# Patient Record
Sex: FEMALE | Race: BLACK OR AFRICAN AMERICAN | Employment: FULL TIME | URBAN - METROPOLITAN AREA
[De-identification: names, ages, dates, MRNs, and addresses within clinical notes are randomized per-mention and may not be internally consistent; named-entity substitution may affect disease eponyms.]

---

## 2017-04-03 ENCOUNTER — OFFICE VISIT (OUTPATIENT)
Dept: OBGYN CLINIC | Age: 37
End: 2017-04-03

## 2017-04-03 VITALS
BODY MASS INDEX: 27.42 KG/M2 | HEIGHT: 66 IN | WEIGHT: 170.6 LBS | HEART RATE: 72 BPM | DIASTOLIC BLOOD PRESSURE: 50 MMHG | RESPIRATION RATE: 18 BRPM | SYSTOLIC BLOOD PRESSURE: 112 MMHG

## 2017-04-03 DIAGNOSIS — Z01.419 WELL WOMAN EXAM WITH ROUTINE GYNECOLOGICAL EXAM: Primary | ICD-10-CM

## 2017-04-03 DIAGNOSIS — Z01.419 WELL WOMAN EXAM WITH ROUTINE GYNECOLOGICAL EXAM: ICD-10-CM

## 2017-04-03 DIAGNOSIS — Z11.51 SCREENING FOR HPV (HUMAN PAPILLOMAVIRUS): ICD-10-CM

## 2017-04-03 DIAGNOSIS — Z11.3 SCREENING FOR VENEREAL DISEASE: ICD-10-CM

## 2017-04-03 DIAGNOSIS — Z30.09 BIRTH CONTROL COUNSELING: ICD-10-CM

## 2017-04-03 LAB
HCG URINE, QL. (POC): NEGATIVE
VALID INTERNAL CONTROL?: YES

## 2017-04-03 RX ORDER — MEDROXYPROGESTERONE ACETATE 150 MG/ML
150 INJECTION, SUSPENSION INTRAMUSCULAR ONCE
Qty: 1 SYRINGE | Refills: 3
Start: 2017-04-03 | End: 2017-04-03

## 2017-04-03 NOTE — MR AVS SNAPSHOT
Visit Information Date & Time Provider Department Dept. Phone Encounter #  
 4/3/2017 11:00 AM Ariel Brown MD Disla Satish [phone] [id number] Follow-up Instructions Return in about 1 year (around 4/3/2018). Follow-up and Disposition History Upcoming Health Maintenance Date Due INFLUENZA AGE 9 TO ADULT 8/1/2016 PAP AKA CERVICAL CYTOLOGY 4/9/2017 Allergies as of 4/3/2017  Review Complete On: 4/3/2017 By: Ariel Brown MD  
 No Known Allergies Current Immunizations  Never Reviewed No immunizations on file. Not reviewed this visit You Were Diagnosed With   
  
 Codes Comments Well woman exam with routine gynecological exam    -  Primary ICD-10-CM: L48.434 ICD-9-CM: V72.31 Birth control counseling     ICD-10-CM: Z30.09 
ICD-9-CM: V25.09 Screening for venereal disease     ICD-10-CM: Z11.3 ICD-9-CM: V74.5 Screening for HPV (human papillomavirus)     ICD-10-CM: Z11.51 
ICD-9-CM: V73.81 Well woman exam with routine gynecological exam     ICD-10-CM: R67.883 ICD-9-CM: V72.31 [V72.31] Vitals BP Pulse Resp Height(growth percentile) Weight(growth percentile) LMP  
 112/50 (BP 1 Location: Left arm, BP Patient Position: Sitting) 72 18 5' 6\" (1.676 m) 170 lb 9.6 oz (77.4 kg) 03/24/2017 (Exact Date) BMI OB Status Smoking Status 27.54 kg/m2 Having regular periods Never Smoker BMI and BSA Data Body Mass Index Body Surface Area  
 27.54 kg/m 2 1.9 m 2 Preferred Pharmacy Pharmacy Name Phone [facility], [address] [place] [address] Your Updated Medication List  
  
   
This list is accurate as of: 4/3/17  4:04 PM.  Always use your most recent med list.  
  
  
  
  
 medroxyPROGESTERone 150 mg/mL Syrg Commonly known as:  DEPO-PROVERA  
1 mL by IntraMUSCular route once for 1 dose. We Performed the Following AMB POC URINE PREGNANCY TEST, VISUAL COLOR COMPARISON [70510 CPT(R)] HCV AB W/REFLEX VERIFICATION [[id number] Custom] HEP B SURFACE AG [id number] CPT(R)] HERPES SIMPLEX VIRUS TYPES 1/2 SPECIFIC AB, IGG [[id number] Custom] HIV 1/2 AG/AB, 4TH GENERATION,W RFLX CONFIRM [[id number] Custom] PAP IG, CT-NG, HPV 16&18,45([id number], [id number]) [[id number] Custom] RPR [26260 CPT(R)] Follow-up Instructions Return in about 1 year (around 4/3/2018). Patient Instructions Well Visit, Ages 25 to 48: Care Instructions Your Care Instructions Physical exams can help you stay healthy. Your doctor has checked your overall health and may have suggested ways to take good care of yourself. He or she also may have recommended tests. At home, you can help prevent illness with healthy eating, regular exercise, and other steps. Follow-up care is a key part of your treatment and safety. Be sure to make and go to all appointments, and call your doctor if you are having problems. It's also a good idea to know your test results and keep a list of the medicines you take. How can you care for yourself at home? · Reach and stay at a healthy weight. This will lower your risk for many problems, such as obesity, diabetes, heart disease, and high blood pressure. · Get at least 30 minutes of physical activity on most days of the week. Walking is a good choice. You also may want to do other activities, such as running, swimming, cycling, or playing tennis or team sports. Discuss any changes in your exercise program with your doctor. · Do not smoke or allow others to smoke around you. If you need help quitting, talk to your doctor about stop-smoking programs and medicines. These can increase your chances of quitting for good. · Talk to your doctor about whether you have any risk factors for sexually transmitted infections (STIs).  Having one sex partner (who does not have STIs and does not have sex with anyone else) is a good way to avoid these infections. · Use birth control if you do not want to have children at this time. Talk with your doctor about the choices available and what might be best for you. · Protect your skin from too much sun. When you're outdoors from 10 a.m. to 4 p.m., stay in the shade or cover up with clothing and a hat with a wide brim. Wear sunglasses that block UV rays. Even when it's cloudy, put broad-spectrum sunscreen (SPF 30 or higher) on any exposed skin. · See a dentist one or two times a year for checkups and to have your teeth cleaned. · Wear a seat belt in the car. · Drink alcohol in moderation, if at all. That means no more than 2 drinks a day for men and 1 drink a day for women. Follow your doctor's advice about when to have certain tests. These tests can spot problems early. For everyone · Cholesterol. Have the fat (cholesterol) in your blood tested after age 21. Your doctor will tell you how often to have this done based on your age, family history, or other things that can increase your risk for heart disease. · Blood pressure. Have your blood pressure checked during a routine doctor visit. Your doctor will tell you how often to check your blood pressure based on your age, your blood pressure results, and other factors. · Vision. Talk with your doctor about how often to have a glaucoma test. 
· Diabetes. Ask your doctor whether you should have tests for diabetes. · Colon cancer. Have a test for colon cancer at age 48. You may have one of several tests. If you are younger than 48, you may need a test earlier if you have any risk factors. Risk factors include whether you already had a precancerous polyp removed from your colon or whether your parent, brother, sister, or child has had colon cancer. For women · Breast exam and mammogram. Talk to your doctor about when you should have a clinical breast exam and a mammogram. Medical experts differ on whether and how often women under 50 should have these tests. Your doctor can help you decide what is right for you. · Pap test and pelvic exam. Begin Pap tests at age 24. A Pap test is the best way to find cervical cancer. The test often is part of a pelvic exam. Ask how often to have this test. 
· Tests for sexually transmitted infections (STIs). Ask whether you should have tests for STIs. You may be at risk if you have sex with more than one person, especially if your partners do not wear condoms. For men · Tests for sexually transmitted infections (STIs). Ask whether you should have tests for STIs. You may be at risk if you have sex with more than one person, especially if you do not wear a condom. · Testicular cancer exam. Ask your doctor whether you should check your testicles regularly. · Prostate exam. Talk to your doctor about whether you should have a blood test (called a PSA test) for prostate cancer. Experts differ on whether and when men should have this test. Some experts suggest it if you are older than 39 and are -American or have a father or brother who got prostate cancer when he was younger than 72. When should you call for help? Watch closely for changes in your health, and be sure to contact your doctor if you have any problems or symptoms that concern you. Where can you learn more? Go to http://link-seema.info/. Enter P072 in the search box to learn more about \"Well Visit, Ages 25 to 48: Care Instructions. \" Current as of: July 19, 2016 Content Version: 11.2 © 0321-9059 [employer]wise, [employer]orporated. Care instructions adapted under license by [employer] (which disclaims liability or warranty for this information).  If you have questions about a medical condition or this instruction, always ask your healthcare professional. [employer] disclaims any warranty or liability for your use of this information. Introducing [facility]! Alfonso Keith introduces [employer] patient portal. Now you can access parts of your medical record, email your doctor's office, and request medication refills online. 1. In your internet browser, go to [email or web address] 2. Click on the First Time User? Click Here link in the Sign In box. You will see the New Member Sign Up page. 3. Enter your [employer] Access Code exactly as it appears below. You will not need to use this code after youve completed the sign-up process. If you do not sign up before the expiration date, you must request a new code. · [employer] Access Code: [email or web address] Expires: 7/2/2017 11:27 AM 
 
4. Enter the last four digits of your Social Security Number (xxxx) and Date of Birth (mm/dd/yyyy) as indicated and click Submit. You will be taken to the next sign-up page. 5. Create a [employer] Online ID. This will be your [employer] login ID and cannot be changed, so think of one that is secure and easy to remember. 6. Create a [employer] password. You can change your password at any time. 7. Enter your Password Reset Question and Answer. This can be used at a later time if you forget your password. 8. Enter your e-mail address. You will receive e-mail notification when new information is available in [address] 9. Click Sign Up. You can now view and download portions of your medical record. 10. Click the Download Summary menu link to download a portable copy of your medical information. If you have questions, please visit the Frequently Asked Questions section of the [employer] website. Remember, [employer] Online is NOT to be used for urgent needs. For medical emergencies, dial 911. Now available from your iPhone and Android! Please provide this summary of care documentation to your next provider. Your primary care clinician is listed as NONE. If you have any questions after today's visit, please call [phone].

## 2017-04-03 NOTE — PATIENT INSTRUCTIONS

## 2017-04-03 NOTE — PROGRESS NOTES
Annual exam ages 21-44    [address] [facility] Alla is a ,  39 y.o. female [address] Patient's last menstrual period was 2017 (exact date). .    She presents for her annual checkup. She is having no significant problems at this time, however would like to start Depo Provera injections. With regard to the Gardasil vaccine, she is older than the FDA approved age to receive it. Menstrual status:    Her periods are moderate in flow. She is using three to five pads or tampons per day, usually regular and last 26-30 days. She denies dysmenorrhea. She reports no premenstrual symptoms. Contraception:    The current method of family planning is none. She wants to start Depo Provera. She wants STD testing. Sexual history:     She  reports that she currently engages in sexual activity and has had male partners. She reports using the following method of birth control/protection: None. .    Medical conditions:    Since her last annual GYN exam about two years ago, she has not the following changes in her health history: She has ongoing problems with anemia and will start IV iron soon. Pap and Mammogram History:    Her most recent Pap smear was normal, obtained 2 year(s) ago. The patient has never had a mammogram.    Breast Cancer History/Substance Abuse: negative    Past Medical History:   Diagnosis Date    Anemia     Hepatitis C     treated    Hx of abnormal Pap smear     Ill-defined condition     anemia hx, has received several blood transfusion, last blood transfusion May 5 2014    Intestinal perforation [facility])      Past Surgical History:   Procedure Laterality Date    HX GASTRIC BYPASS  2005    HX GI  2009    perforated intestines    HX GYN       x 4       Current Outpatient Prescriptions   Medication Sig Dispense Refill    oxycodone-acetaminophen (PERCOCET) 5-325 mg per tablet Take 1-2 tablets by mouth every four (4) hours as needed for Pain.  16 tablet 0 Allergies: Review of patient's allergies indicates no known allergies. Tobacco History:  reports that she has never smoked. She does not have any smokeless tobacco history on file. Alcohol Abuse:  reports that she does not drink alcohol. Drug Abuse:  reports that she does not use illicit drugs.     Family Medical/Cancer History:   Family History   Problem Relation Age of Onset    Diabetes Maternal Grandmother         Review of Systems - History obtained from the patient  Constitutional: negative for weight loss, fever, night sweats  HEENT: negative for hearing loss, earache, congestion, snoring, sorethroat  CV: negative for chest pain, palpitations, edema  Resp: negative for cough, shortness of breath, wheezing  GI: negative for change in bowel habits, abdominal pain, black or bloody stools  : negative for frequency, dysuria, hematuria, vaginal discharge  MSK: negative for back pain, joint pain, muscle pain  Breast: negative for breast lumps, nipple discharge, galactorrhea  Skin :negative for itching, rash, hives  Neuro: negative for dizziness, headache, confusion, weakness  Psych: negative for anxiety, depression, change in mood  Heme/lymph: negative for bleeding, bruising, pallor    Physical Exam    Visit Vitals    /50 (BP 1 Location: Left arm, BP Patient Position: Sitting)    Pulse 72    Resp 18    Ht 5' 6\" (1.676 m)    Wt 170 lb 9.6 oz (77.4 kg)    LMP 03/24/2017 (Exact Date)    BMI 27.54 kg/m2       Constitutional  · Appearance: well-nourished, well developed, alert, in no acute distress    HENT  · Head and Face: appears normal    Neck  · Inspection/Palpation: normal appearance, no masses or tenderness  · Lymph Nodes: no lymphadenopathy present  · Thyroid: gland size normal, nontender, no nodules or masses present on palpation    Chest  · Respiratory Effort: breathing unlabored  · Auscultation    Cardiovascular  · Heart:  · Auscultation:     Breasts  · Inspection of Breasts: breasts symmetrical, no skin changes, no discharge present, nipple appearance normal, no skin retraction present  · Palpation of Breasts and Axillae: no masses present on palpation, no breast tenderness  · Axillary Lymph Nodes: no lymphadenopathy present    Gastrointestinal  · Abdominal Examination: abdomen non-tender to palpation, normal bowel sounds, no masses present  · Liver and spleen: no hepatomegaly present, spleen not palpable  · Hernias: no hernias identified    Genitourinary  · External Genitalia: normal appearance for age, no discharge present, no tenderness present, no inflammatory lesions present, no masses present, no atrophy present  · Vagina: normal vaginal vault without central or paravaginal defects, no discharge present, no inflammatory lesions present, no masses present  · Bladder: non-tender to palpation  · Urethra: appears normal  · Cervix: normal   · Uterus: normal size, shape and consistency  · Adnexa: no adnexal tenderness present, no adnexal masses present  · Perineum: perineum within normal limits, no evidence of trauma, no rashes or skin lesions present  · Anus: anus within normal limits, no hemorrhoids present  · Inguinal Lymph Nodes: no lymphadenopathy present    Skin  · General Inspection: no rash, no lesions identified    Neurologic/Psychiatric  · Mental Status:  · Orientation: grossly oriented to person, place and time  · Mood and Affect: mood normal, affect appropriate    . Assessment:  Routine gynecologic examination  Her current medical status is satisfactory with no evidence of significant gynecologic issues. Plan:  Counseled re: diet, exercise, healthy lifestyle  Return for yearly wellness visits  She will have STD testing. She will start Depo Provera with her next menses.

## 2017-04-04 ENCOUNTER — TELEPHONE (OUTPATIENT)
Dept: OBGYN CLINIC | Age: 37
End: 2017-04-04

## 2017-04-04 LAB
COMMENT, 144067: NORMAL
HBV SURFACE AG SERPL QL IA: NEGATIVE
HCV AB S/CO SERPL IA: <0.1 S/CO RATIO (ref 0–0.9)
HIV 1+2 AB+HIV1 P24 AG SERPL QL IA: NON REACTIVE
HSV1 IGG SER IA-ACNC: <0.91 INDEX (ref 0–0.9)
HSV2 IGG SER IA-ACNC: 22.5 INDEX (ref 0–0.9)
RPR SER QL: NON REACTIVE

## 2017-04-04 NOTE — TELEPHONE ENCOUNTER
Blood work is normal except for evidence of past infection with herpes type 2. Her pap and GC/chlamydia won't be back till next week.

## 2017-04-06 LAB
C TRACH RRNA CVX QL NAA+PROBE: NEGATIVE
CYTOLOGIST CVX/VAG CYTO: ABNORMAL
CYTOLOGY CVX/VAG DOC THIN PREP: ABNORMAL
CYTOLOGY HISTORY:: ABNORMAL
DX ICD CODE: ABNORMAL
DX ICD CODE: ABNORMAL
HPV I/H RISK 1 DNA CVX QL PROBE+SIG AMP: POSITIVE
Lab: ABNORMAL
N GONORRHOEA RRNA CVX QL NAA+PROBE: NEGATIVE
OTHER STN SPEC: ABNORMAL
PATH REPORT.FINAL DX SPEC: ABNORMAL
PATHOLOGIST CVX/VAG CYTO: ABNORMAL
STAT OF ADQ CVX/VAG CYTO-IMP: ABNORMAL

## 2017-04-19 ENCOUNTER — OP HISTORICAL/CONVERTED ENCOUNTER (OUTPATIENT)
Dept: OTHER | Age: 37
End: 2017-04-19

## 2017-04-26 ENCOUNTER — ED HISTORICAL/CONVERTED ENCOUNTER (OUTPATIENT)
Dept: OTHER | Age: 37
End: 2017-04-26

## 2017-04-27 ENCOUNTER — ED HISTORICAL/CONVERTED ENCOUNTER (OUTPATIENT)
Dept: OTHER | Age: 37
End: 2017-04-27

## 2017-12-12 ENCOUNTER — TELEPHONE (OUTPATIENT)
Dept: OBGYN CLINIC | Age: 37
End: 2017-12-12

## 2017-12-12 RX ORDER — ACYCLOVIR 400 MG/1
400 TABLET ORAL 3 TIMES DAILY
Qty: 15 TAB | Refills: 0 | Status: SHIPPED | OUTPATIENT
Start: 2017-12-12 | End: 2017-12-17

## 2017-12-12 NOTE — TELEPHONE ENCOUNTER
Patient aware of MD recommendations and aware that rx was sent. 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 Patient advised that it is crucial that she follow up on her abnormal pap smear and patient reports that she will call back tomorrow to schedule the colpo procedure once she has her schedule.

## 2017-12-12 NOTE — TELEPHONE ENCOUNTER
LMTCB 12:00 pm- need to advise regarding the Acyclovir rx and abnormal pap smear- ASCUS- needs to set up for Colpo

## 2017-12-12 NOTE — TELEPHONE ENCOUNTER
She can have Acyclovir 400 mg TID for 5 days. Script sent. Please advise her that her Pap in April was abnormal and we were unable to contact her after multiple attempts. We need current contact information and she needs to make an appt for a colposcopy to rule out cervical cancer.

## 2017-12-12 NOTE — TELEPHONE ENCOUNTER
Patient is having what she thinks is an active  outbreak of genital herpes lesions and is requesting rx of Acyclovir to be sent to [facility] [place]. Tested positive for HSV 2 on 4/3/17. Please advise.

## 2018-02-21 ENCOUNTER — TELEPHONE (OUTPATIENT)
Dept: OBGYN CLINIC | Age: 38
End: 2018-02-21

## 2018-02-21 RX ORDER — ACYCLOVIR 400 MG/1
TABLET ORAL
Qty: 15 TAB | Refills: 0 | Status: SHIPPED | OUTPATIENT
Start: 2018-02-21 | End: 2018-04-19 | Stop reason: SDUPTHER

## 2018-02-21 RX ORDER — ACYCLOVIR 50 MG/G
OINTMENT TOPICAL
Qty: 2 G | Refills: 0 | Status: SHIPPED | OUTPATIENT
Start: 2018-02-21 | End: 2021-10-21

## 2018-02-21 NOTE — TELEPHONE ENCOUNTER
Call received at 9:18am      40year old patient last seen in the office on 4/3/17. Patient calling to ask for a refill for Valtrex pills and cream for a herpes outbreak, bump in vaginal area that she noted yesterday.       Prescription last filled on 12/12/17      Please advise

## 2018-02-21 NOTE — TELEPHONE ENCOUNTER
Patient advised of MD recommendations and verbalized understanding. Patient placed on the schedule for 2/1/18 at 10:100am for colposcopy. Prescriptions sent as per MD order to patient preferred pharmacy. Patient verbalized understanding.

## 2018-02-21 NOTE — TELEPHONE ENCOUNTER
She can ONLY have a refill if she makes an appt for a colposcopy. If she does not keep the appt for the colposcopy then I cannot give her any more refills after this.

## 2018-03-01 ENCOUNTER — OFFICE VISIT (OUTPATIENT)
Dept: OBGYN CLINIC | Age: 38
End: 2018-03-01

## 2018-03-01 VITALS — HEIGHT: 66 IN | RESPIRATION RATE: 19 BRPM

## 2018-03-01 DIAGNOSIS — R87.610 ASCUS WITH POSITIVE HIGH RISK HPV CERVICAL: Primary | ICD-10-CM

## 2018-03-01 DIAGNOSIS — R87.810 ASCUS WITH POSITIVE HIGH RISK HPV CERVICAL: Primary | ICD-10-CM

## 2018-03-01 DIAGNOSIS — Z32.02 NEGATIVE PREGNANCY TEST: ICD-10-CM

## 2018-03-01 LAB
HCG URINE, QL. (POC): NEGATIVE
VALID INTERNAL CONTROL?: YES

## 2018-03-01 RX ORDER — NORGESTIMATE AND ETHINYL ESTRADIOL 0.25-0.035
1 KIT ORAL DAILY
Qty: 1 DOSE PACK | Refills: 3 | Status: SHIPPED | OUTPATIENT
Start: 2018-03-01 | End: 2021-10-21

## 2018-03-01 NOTE — PROGRESS NOTES
[facility]  OFFICE PROCEDURE PROGRESS NOTE        Chart reviewed for the following:   [place] MCFARLANE, have reviewed the History, Physical and updated the Allergic reactions for [address] performed immediately prior to start of procedure:   [place] MCFARLANE, have performed the following reviews on Yovany prior to the start of the procedure:            * Patient was identified by name and date of birth   * Agreement on procedure being performed was verified  * Risks and Benefits explained to the patient  * Consent was signed and verified     Time: 10:50am     Date of procedure: 3/1/2018    Procedure performed by: Chris Watson MD    Provider assisted by: Penelope Betancourt MA    Patient assisted by: self    How tolerated by patient: tolerated the procedure well with no complications    Post Procedural Pain Scale: 0 - No Hurt    Comments: none    Procedure Note: Colposcopy    Yovany is a ,  40 y.o. female [address] whose Patient's last menstrual period was 2018 (exact date). was on 2018. She presents for colposcopy for evaluation of a cervical abnormality with a pap smear abnormality consisting of ASCUS and HPV. After being presented with the risks, benefits and alternatives has she signed a consent for the procedure. She states that she understands the need for the procedure and has no further questions. She was informed that she may experience discomfort. Procedure:  She was positioned in the dorsal lithotomy position and a speculum was inserted into the vagina. Dilute acetic acid was applied to the cervix. The colposcope was used to visualize the cervix. Procedure: The transformation zone was completely visualized. Findings: This colposcopy was satisfactory. Biopsies were taken from the cervix, placed in formalin, and sent to pathology. See accompanying diagram for biopsy sites. Monsels was applied to the cervix. Endocervical currettage: An endocervical curettage was performed. Findings:The procedure was notable for white epithelium on the cervix. Post Procedure Status: The patient tolerated the procedure well with minimal discomfort. She wants to start [facility] and requests OCPs.

## 2018-03-01 NOTE — LETTER
3/16/2018 2:00 PM 
 
 
 
 
 
 
[facility] [place] [address] Dear Jerome Maharaj We have been unable to reach you by telephone regarding your results. Please call our office at [phone] at your earliest convenience. Respectfully, Esteban Herbert Dr [facility] [place] Nurse

## 2018-03-06 ENCOUNTER — ED HISTORICAL/CONVERTED ENCOUNTER (OUTPATIENT)
Dept: OTHER | Age: 38
End: 2018-03-06

## 2018-03-07 LAB
DX ICD CODE: NORMAL
DX ICD CODE: NORMAL
PATH REPORT.FINAL DX SPEC: NORMAL
PATH REPORT.GROSS SPEC: NORMAL
PATH REPORT.SITE OF ORIGIN SPEC: NORMAL
PATHOLOGIST NAME: NORMAL
PAYMENT PROCEDURE: NORMAL

## 2018-04-19 ENCOUNTER — TELEPHONE (OUTPATIENT)
Dept: OBGYN CLINIC | Age: 38
End: 2018-04-19

## 2018-04-19 RX ORDER — ACYCLOVIR 400 MG/1
TABLET ORAL
Qty: 15 TAB | Refills: 0 | Status: SHIPPED | OUTPATIENT
Start: 2018-04-19 | End: 2018-10-08 | Stop reason: SDUPTHER

## 2018-04-19 NOTE — TELEPHONE ENCOUNTER
Refill on prescription sent for Zovirax as per MD order to patient preferred pharmacy. This nurse left a detailed message as requested by the patient that prescription has been sent.

## 2018-04-19 NOTE — TELEPHONE ENCOUNTER
Call received at 10:45am      40year old patient last seen in the office on 4/3/18 for AE and 3/1/18 for colposcopy. Patient calling to say that starting a few days ago she is feeling itching, burning and tingling in her vaginal area and feels an HSV out break coming on .     Patient would like a refill on her medication      Please advise

## 2018-06-26 ENCOUNTER — TELEPHONE (OUTPATIENT)
Dept: OBGYN CLINIC | Age: 38
End: 2018-06-26

## 2018-06-26 NOTE — TELEPHONE ENCOUNTER
If she has stopped the pill then do a pregnancy test and if its negative then bleeding should stop on its own this week. If bleeding continues or she is worried then come in for exam and to check her hemoglobin.

## 2018-06-26 NOTE — TELEPHONE ENCOUNTER
Call received at 509am    45year old patient last seen in the office on 3/1/18 for problem visit. Patient calling to say that she has been bleeding since starting the [address] [place] [address] on 6/3/18. Patient reports the flow is heavy changing her pad every  3 hours. Patient denies cramping. Patient reports she is feeling tired, exhausted and has a headache. Patient report history of anemia. Patient reported that she had been taking the birth control every day at the same time until last Thursday when she stopped taking the pill. The patient wants to have the bleeding stop and does not want to take Sprintec any more. Patient is due for AE and states she will schedule when the bleeding has stopped.       Please advise

## 2018-10-08 ENCOUNTER — TELEPHONE (OUTPATIENT)
Dept: OBGYN CLINIC | Age: 38
End: 2018-10-08

## 2018-10-08 RX ORDER — ACYCLOVIR 400 MG/1
TABLET ORAL
Qty: 15 TAB | Refills: 3 | Status: SHIPPED | OUTPATIENT
Start: 2018-10-08 | End: 2022-01-03 | Stop reason: ALTCHOICE

## 2018-10-08 NOTE — TELEPHONE ENCOUNTER
Patient is calling to see if she can have Acyclovir 400 mg she takes with outbreaks. She is currently having a vaginal outbreak. Acyclovir 400 mg- takes one tablet by mouth TID day x 5 days        [facility]. Patient thinks she may be pregnant. She admits that she needs to take a pregnancy test but she is drinking. 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

## 2018-11-20 ENCOUNTER — ED HISTORICAL/CONVERTED ENCOUNTER (OUTPATIENT)
Dept: OTHER | Age: 38
End: 2018-11-20

## 2018-12-16 ENCOUNTER — ED HISTORICAL/CONVERTED ENCOUNTER (OUTPATIENT)
Dept: OTHER | Age: 38
End: 2018-12-16

## 2019-04-08 RX ORDER — ETONOGESTREL AND ETHINYL ESTRADIOL 11.7; 2.7 MG/1; MG/1
INSERT, EXTENDED RELEASE VAGINAL
Qty: 1 DEVICE | Refills: 0 | Status: SHIPPED | OUTPATIENT
Start: 2019-04-08 | End: 2021-10-21

## 2019-04-08 NOTE — TELEPHONE ENCOUNTER
Requesting refill of nuvaring, going out of town and will have AE when she comes back.  Last AE 03/2018

## 2019-05-04 ENCOUNTER — ED HISTORICAL/CONVERTED ENCOUNTER (OUTPATIENT)
Dept: OTHER | Age: 39
End: 2019-05-04

## 2019-05-05 ENCOUNTER — ED HISTORICAL/CONVERTED ENCOUNTER (OUTPATIENT)
Dept: OTHER | Age: 39
End: 2019-05-05

## 2019-05-08 ENCOUNTER — ED HISTORICAL/CONVERTED ENCOUNTER (OUTPATIENT)
Dept: OTHER | Age: 39
End: 2019-05-08

## 2019-05-10 ENCOUNTER — OP HISTORICAL/CONVERTED ENCOUNTER (OUTPATIENT)
Dept: OTHER | Age: 39
End: 2019-05-10

## 2019-05-17 ENCOUNTER — OP HISTORICAL/CONVERTED ENCOUNTER (OUTPATIENT)
Dept: OTHER | Age: 39
End: 2019-05-17

## 2019-07-18 ENCOUNTER — ED HISTORICAL/CONVERTED ENCOUNTER (OUTPATIENT)
Dept: OTHER | Age: 39
End: 2019-07-18

## 2021-04-07 ENCOUNTER — TELEPHONE (OUTPATIENT)
Dept: SURGERY | Age: 41
End: 2021-04-07

## 2021-04-07 ENCOUNTER — HOSPITAL ENCOUNTER (OUTPATIENT)
Age: 41
Setting detail: OBSERVATION
Discharge: HOME | End: 2021-04-09
Attending: SURGERY | Admitting: SURGERY
Payer: COMMERCIAL

## 2021-04-07 ENCOUNTER — OFFICE VISIT (OUTPATIENT)
Dept: SURGERY | Age: 41
End: 2021-04-07
Payer: COMMERCIAL

## 2021-04-07 VITALS
SYSTOLIC BLOOD PRESSURE: 107 MMHG | OXYGEN SATURATION: 98 % | HEART RATE: 85 BPM | BODY MASS INDEX: 26.2 KG/M2 | RESPIRATION RATE: 18 BRPM | DIASTOLIC BLOOD PRESSURE: 69 MMHG | HEIGHT: 66 IN | TEMPERATURE: 98.7 F | WEIGHT: 163 LBS

## 2021-04-07 DIAGNOSIS — K28.9 MARGINAL ULCER: Primary | ICD-10-CM

## 2021-04-07 DIAGNOSIS — R13.19 ESOPHAGEAL DYSPHAGIA: Primary | ICD-10-CM

## 2021-04-07 PROBLEM — E55.9 VITAMIN D DEFICIENCY: Status: ACTIVE | Noted: 2021-04-07

## 2021-04-07 PROBLEM — R13.10 DYSPHAGIA: Status: ACTIVE | Noted: 2021-04-07

## 2021-04-07 PROBLEM — D50.9 MICROCYTIC ANEMIA: Status: ACTIVE | Noted: 2021-04-07

## 2021-04-07 LAB
25(OH)D3 SERPL-MCNC: 16.2 NG/ML (ref 30–100)
ALBUMIN SERPL-MCNC: 3.4 G/DL (ref 3.5–5)
ALBUMIN/GLOB SERPL: 0.9 {RATIO} (ref 1.1–2.2)
ALP SERPL-CCNC: 42 U/L (ref 45–117)
ALT SERPL-CCNC: 32 U/L (ref 12–78)
ANION GAP SERPL CALC-SCNC: 5 MMOL/L (ref 5–15)
APPEARANCE UR: CLEAR
AST SERPL-CCNC: 21 U/L (ref 15–37)
BACTERIA URNS QL MICRO: NEGATIVE /HPF
BASOPHILS # BLD: 0 K/UL (ref 0–0.1)
BASOPHILS NFR BLD: 0 % (ref 0–1)
BILIRUB SERPL-MCNC: 0.4 MG/DL (ref 0.2–1)
BILIRUB UR QL: NEGATIVE
BUN SERPL-MCNC: 14 MG/DL (ref 6–20)
BUN/CREAT SERPL: 18 (ref 12–20)
CALCIUM SERPL-MCNC: 9.4 MG/DL (ref 8.5–10.1)
CHLORIDE SERPL-SCNC: 107 MMOL/L (ref 97–108)
CO2 SERPL-SCNC: 26 MMOL/L (ref 21–32)
COLOR UR: NORMAL
COMMENT, HOLDF: NORMAL
COVID-19 RAPID TEST, COVR: NOT DETECTED
CREAT SERPL-MCNC: 0.76 MG/DL (ref 0.55–1.02)
DIFFERENTIAL METHOD BLD: ABNORMAL
EOSINOPHIL # BLD: 0.1 K/UL (ref 0–0.4)
EOSINOPHIL NFR BLD: 2 % (ref 0–7)
EPITH CASTS URNS QL MICRO: NORMAL /LPF
ERYTHROCYTE [DISTWIDTH] IN BLOOD BY AUTOMATED COUNT: 24.3 % (ref 11.5–14.5)
FOLATE SERPL-MCNC: 8.4 NG/ML (ref 5–21)
GLOBULIN SER CALC-MCNC: 3.8 G/DL (ref 2–4)
GLUCOSE SERPL-MCNC: 83 MG/DL (ref 65–100)
GLUCOSE UR STRIP.AUTO-MCNC: NEGATIVE MG/DL
HCT VFR BLD AUTO: 32.5 % (ref 35–47)
HGB BLD-MCNC: 9.6 G/DL (ref 11.5–16)
HGB UR QL STRIP: NEGATIVE
HYALINE CASTS URNS QL MICRO: NORMAL /LPF (ref 0–5)
IMM GRANULOCYTES # BLD AUTO: 0 K/UL (ref 0–0.04)
IMM GRANULOCYTES NFR BLD AUTO: 0 % (ref 0–0.5)
IRON SATN MFR SERPL: 4 % (ref 20–50)
IRON SERPL-MCNC: 16 UG/DL (ref 35–150)
KETONES UR QL STRIP.AUTO: NEGATIVE MG/DL
LEUKOCYTE ESTERASE UR QL STRIP.AUTO: NEGATIVE
LYMPHOCYTES # BLD: 2.1 K/UL (ref 0.8–3.5)
LYMPHOCYTES NFR BLD: 32 % (ref 12–49)
MCH RBC QN AUTO: 21.6 PG (ref 26–34)
MCHC RBC AUTO-ENTMCNC: 29.5 G/DL (ref 30–36.5)
MCV RBC AUTO: 73.2 FL (ref 80–99)
MONOCYTES # BLD: 0.5 K/UL (ref 0–1)
MONOCYTES NFR BLD: 8 % (ref 5–13)
NEUTS SEG # BLD: 4 K/UL (ref 1.8–8)
NEUTS SEG NFR BLD: 58 % (ref 32–75)
NITRITE UR QL STRIP.AUTO: NEGATIVE
NRBC # BLD: 0 K/UL (ref 0–0.01)
NRBC BLD-RTO: 0 PER 100 WBC
PH UR STRIP: 6.5 [PH] (ref 5–8)
PLATELET # BLD AUTO: 183 K/UL (ref 150–400)
POTASSIUM SERPL-SCNC: 3.8 MMOL/L (ref 3.5–5.1)
PROT SERPL-MCNC: 7.2 G/DL (ref 6.4–8.2)
PROT UR STRIP-MCNC: NEGATIVE MG/DL
RBC # BLD AUTO: 4.44 M/UL (ref 3.8–5.2)
RBC #/AREA URNS HPF: NORMAL /HPF (ref 0–5)
RBC MORPH BLD: ABNORMAL
SAMPLES BEING HELD,HOLD: NORMAL
SODIUM SERPL-SCNC: 138 MMOL/L (ref 136–145)
SOURCE, COVRS: NORMAL
SP GR UR REFRACTOMETRY: 1.02 (ref 1–1.03)
TIBC SERPL-MCNC: 376 UG/DL (ref 250–450)
UR CULT HOLD, URHOLD: NORMAL
UROBILINOGEN UR QL STRIP.AUTO: 1 EU/DL (ref 0.2–1)
VIT B12 SERPL-MCNC: 468 PG/ML (ref 193–986)
WBC # BLD AUTO: 6.7 K/UL (ref 3.6–11)
WBC URNS QL MICRO: NORMAL /HPF (ref 0–4)

## 2021-04-07 PROCEDURE — 83550 IRON BINDING TEST: CPT

## 2021-04-07 PROCEDURE — 74011250637 HC RX REV CODE- 250/637: Performed by: SURGERY

## 2021-04-07 PROCEDURE — 74011250636 HC RX REV CODE- 250/636: Performed by: SURGERY

## 2021-04-07 PROCEDURE — 99218 HC RM OBSERVATION: CPT

## 2021-04-07 PROCEDURE — 36415 COLL VENOUS BLD VENIPUNCTURE: CPT

## 2021-04-07 PROCEDURE — 75810000275 HC EMERGENCY DEPT VISIT NO LEVEL OF CARE

## 2021-04-07 PROCEDURE — 96374 THER/PROPH/DIAG INJ IV PUSH: CPT

## 2021-04-07 PROCEDURE — 82746 ASSAY OF FOLIC ACID SERUM: CPT

## 2021-04-07 PROCEDURE — 82306 VITAMIN D 25 HYDROXY: CPT

## 2021-04-07 PROCEDURE — 80053 COMPREHEN METABOLIC PANEL: CPT

## 2021-04-07 PROCEDURE — 87635 SARS-COV-2 COVID-19 AMP PRB: CPT

## 2021-04-07 PROCEDURE — 84425 ASSAY OF VITAMIN B-1: CPT

## 2021-04-07 PROCEDURE — 99205 OFFICE O/P NEW HI 60 MIN: CPT | Performed by: SURGERY

## 2021-04-07 PROCEDURE — 82607 VITAMIN B-12: CPT

## 2021-04-07 PROCEDURE — 74011000258 HC RX REV CODE- 258: Performed by: SURGERY

## 2021-04-07 PROCEDURE — 81001 URINALYSIS AUTO W/SCOPE: CPT

## 2021-04-07 PROCEDURE — 85025 COMPLETE CBC W/AUTO DIFF WBC: CPT

## 2021-04-07 PROCEDURE — 99219 PR INITIAL OBSERVATION CARE/DAY 50 MINUTES: CPT | Performed by: SURGERY

## 2021-04-07 RX ORDER — SODIUM CHLORIDE 0.9 % (FLUSH) 0.9 %
5-40 SYRINGE (ML) INJECTION AS NEEDED
Status: DISCONTINUED | OUTPATIENT
Start: 2021-04-07 | End: 2021-04-09 | Stop reason: HOSPADM

## 2021-04-07 RX ORDER — DEXTROSE, SODIUM CHLORIDE, AND POTASSIUM CHLORIDE 5; .45; .15 G/100ML; G/100ML; G/100ML
25 INJECTION INTRAVENOUS CONTINUOUS
Status: DISCONTINUED | OUTPATIENT
Start: 2021-04-07 | End: 2021-04-09 | Stop reason: HOSPADM

## 2021-04-07 RX ORDER — SODIUM FERRIC GLUCONATE COMPLEX IN SUCROSE 12.5 MG/ML
125 INJECTION INTRAVENOUS DAILY
Status: DISCONTINUED | OUTPATIENT
Start: 2021-04-07 | End: 2021-04-07 | Stop reason: CLARIF

## 2021-04-07 RX ORDER — SODIUM CHLORIDE 0.9 % (FLUSH) 0.9 %
5-40 SYRINGE (ML) INJECTION EVERY 8 HOURS
Status: DISCONTINUED | OUTPATIENT
Start: 2021-04-07 | End: 2021-04-09 | Stop reason: HOSPADM

## 2021-04-07 RX ORDER — PROMETHAZINE HYDROCHLORIDE 25 MG/1
12.5 TABLET ORAL
Status: DISCONTINUED | OUTPATIENT
Start: 2021-04-07 | End: 2021-04-09 | Stop reason: HOSPADM

## 2021-04-07 RX ORDER — ONDANSETRON 2 MG/ML
4 INJECTION INTRAMUSCULAR; INTRAVENOUS
Status: DISCONTINUED | OUTPATIENT
Start: 2021-04-07 | End: 2021-04-09 | Stop reason: HOSPADM

## 2021-04-07 RX ORDER — ERGOCALCIFEROL 1.25 MG/1
50000 CAPSULE ORAL
Status: DISCONTINUED | OUTPATIENT
Start: 2021-04-07 | End: 2021-04-09 | Stop reason: HOSPADM

## 2021-04-07 RX ORDER — ZOLPIDEM TARTRATE 5 MG/1
5 TABLET ORAL
Status: DISCONTINUED | OUTPATIENT
Start: 2021-04-07 | End: 2021-04-09 | Stop reason: HOSPADM

## 2021-04-07 RX ADMIN — Medication 10 ML: at 23:24

## 2021-04-07 RX ADMIN — SODIUM CHLORIDE 125 MG: 9 INJECTION, SOLUTION INTRAVENOUS at 23:20

## 2021-04-07 RX ADMIN — ERGOCALCIFEROL 50000 UNITS: 1.25 CAPSULE ORAL at 23:20

## 2021-04-07 RX ADMIN — POTASSIUM CHLORIDE, DEXTROSE MONOHYDRATE AND SODIUM CHLORIDE 125 ML/HR: 150; 5; 450 INJECTION, SOLUTION INTRAVENOUS at 23:25

## 2021-04-07 RX ADMIN — ZOLPIDEM TARTRATE 5 MG: 5 TABLET ORAL at 23:51

## 2021-04-07 NOTE — H&P
Bariatric Surgery H&P / Consultation     CC: Dysphagia  Subjective:      The patient is I 38 y.o. female who underwent gastric bypass in  Dr. Darci Gonzalez subsequently underwent an emergency surgery for perforated intestine.  This was done at [facility]. Bakari Song is on able to tell me whether this was from a marginal ulcer perforation or small bowel perforation.  Around that time she was also taking NSAIDs.  She also reports an immediate within 1 month need for dilation after her bypass from stricture.  Patient has now moved to [place] and is here now because she wanted to continue care where she had the original surgery.  She had no bariatric follow-up to date.  Not smoking, no NSAIDs, no steroids.  She reports immediate emesis after most solid foods.  She is only able to tolerate ginger ale and some liquid diet.  She reports a 40 pound weight loss over the last year. Bakari Song reports epigastric pain that is 5 out of 10 and dull in the epigastrium.  No bilious emesis.  The pain does not radiate.  She started on a PPI last week which has provided some improvement she said but not much.  Avoiding solid foods helps her condition.  Patient is in town for a few days from [place] to have this addressed.  She reports minimal bowel function and dark urine.             Patient Active Problem List     Diagnosis Date Noted    Dysphagia 2021              Past Medical History:   Diagnosis Date    Anemia      GERD (gastroesophageal reflux disease)      Hepatitis C 2010     treated    Hx of abnormal Pap smear      Ill-defined condition       anemia hx, has received several blood transfusion, last blood transfusion May 5 2014    Intestinal perforation [facility])                  Past Surgical History:   Procedure Laterality Date    HX GASTRIC BYPASS       HX GI   2009     perforated intestines    HX GYN          x 4      Social History              Tobacco Use    Smoking status: Former Smoker    Smokeless tobacco: Never Used   Substance Use Topics    Alcohol use: No                Family History   Problem Relation Age of Onset    Diabetes Maternal Grandmother                      Prior to Admission medications    Medication Sig Start Date End Date Taking? Authorizing Provider   ethinyl estradiol-etonogestrel (NUVARING) 0.12-0.015 mg/24 hr vaginal ring Insert one ring per vagina, leave in place for 3 weeks and remove for 1 week 4/8/19     Carolyn Dill MD   acyclovir (ZOVIRAX) 400 mg tablet Take on tablet by mouth 3x day for 5 days 10/8/18     Carolyn Dill MD   norgestimate-ethinyl estradiol ([facility] [address],) 0.25-35 mg-mcg tab Take 1 Tab by mouth daily.  3/1/18     Carolyn Dill MD   acyclovir (ZOVIRAX) 5 % ointment Apply to affected area 5 times a day 2/21/18     Carolyn Dill MD      No Known Allergies      Review of Systems:    Constitutional: No fever or chills  Neurologic: No headache  Eyes: No scleral icterus or irritated eyes  Nose: No nasal pain or drainage  Mouth: No oral lesions or sore throat  Cardiac: No palpations or chest pain  Pulmonary: No cough or shortness or breath  Gastrointestinal: Oral intolerance  Genitourinary: No dysuria  Musculoskeletal: No muscle or joint tenderness  Skin: No rashes or lesions  Psychiatric: No anxiety or depressed mood        Objective:      Physical Exam:  Visit Vitals  /69   Pulse 85   Temp 98.7 °F (37.1 °C) (Oral)   Resp 18   Ht 5' 6\" (1.676 m)   Wt 163 lb (73.9 kg)   SpO2 98%   BMI 26.31 kg/m²      General: No acute distress, conversant  Eyes: PERRLA, no scleral icterus  HENT: Normocephalic without oral lesions  Neck: Trachea midline without LAD  Cardiac: Normal pulse rate and rhythm  Pulmonary: Symmetric chest rise with normal effort  GI: Soft, NT, ND, no hernia, no splenomegaly  Skin: Warm without rash  Extremities: No edema or joint stiffness  Psych: Appropriate mood and affect     Assessment:      Dysphagia after gastric bypass concerning for [address] [facility] stricture  Plan:   Patient here from out of town.  No follow-up from her bariatric nutritional point of view.  Significant weight loss.  Unable to tolerate solid p.o. Sylvester Mendiola for dehydration.  Patient needs to undergo urgent EGD with possible dilation.  Concern for GJ stricture and/or marginal ulcer.  Hopefully with dilation she can go back to tolerating p.o. and go back to [address] will be admitted to the hospital for urgent EGD given weight loss, dehydration, and dysphagia and need to check nutritional markers.  Patient understands these plans and elects to proceed.        Total time involved with this patient's care was: 65 minutes.  This involved reviewing patient record, talking with patient, and charting on patient.  Tensive time was spent coordinating care with consultants and ultimately admitting the patient.     Signed By: Roxanne Mcdonald MD  Bariatric and General Surgeon  [facility]     April 7, 2021

## 2021-04-07 NOTE — TELEPHONE ENCOUNTER
Dr. Campbell Revering with [facility] office called stating they don't accept patient's insurance, Randa Escalante.

## 2021-04-07 NOTE — ED TRIAGE NOTES
Triage Note: Patient is coming in with abdominal pain and not eating for about 1 year. Patient was told having surgery tomorrow by Bariatric surgeon. + nausea.

## 2021-04-07 NOTE — PROGRESS NOTES
Bariatric Surgery H&P / Consultation    CC: Dysphagia  Subjective: The patient is a 36 y.o. female who underwent gastric bypass in  Dr. Phan Arce. She subsequently underwent an emergency surgery for perforated intestine. This was done at [facility].  She is on able to tell me whether this was from a marginal ulcer perforation or small bowel perforation. Around that time she was also taking NSAIDs. She also reports an immediate within 1 month need for dilation after her bypass from stricture. Patient has now moved to [place] and is here now because she wanted to continue care where she had the original surgery. She had no bariatric follow-up to date. Not smoking, no NSAIDs, no steroids. She reports immediate emesis after most solid foods. She is only able to tolerate ginger ale and some liquid diet. She reports a 40 pound weight loss over the last year. She reports epigastric pain that is 5 out of 10 and dull in the epigastrium. No bilious emesis. The pain does not radiate. She started on a PPI last week which has provided some improvement she said but not much. Avoiding solid foods helps her condition. Patient is in town for a few days from [place] to have this addressed. She reports minimal bowel function and dark urine.     Patient Active Problem List    Diagnosis Date Noted    Dysphagia 2021      Past Medical History:   Diagnosis Date    Anemia     GERD (gastroesophageal reflux disease)     Hepatitis C 2010    treated    Hx of abnormal Pap smear     Ill-defined condition     anemia hx, has received several blood transfusion, last blood transfusion May 5 2014    Intestinal perforation [facility])      Past Surgical History:   Procedure Laterality Date    HX GASTRIC BYPASS      HX GI  2009    perforated intestines    HX GYN       x 4      Social History     Tobacco Use    Smoking status: Former Smoker    Smokeless tobacco: Never Used   Substance Use Topics    Alcohol use: No      Family History   Problem Relation Age of Onset    Diabetes Maternal Grandmother       Prior to Admission medications    Medication Sig Start Date End Date Taking? Authorizing Provider   ethinyl estradiol-etonogestrel (NUVARING) 0.12-0.015 mg/24 hr vaginal ring Insert one ring per vagina, leave in place for 3 weeks and remove for 1 week 4/8/19   Espinoza Pepe MD   acyclovir (ZOVIRAX) 400 mg tablet Take on tablet by mouth 3x day for 5 days 10/8/18   Espinoza Pepe MD   norgestimate-ethinyl estradiol ([facility] [address],) 0.25-35 mg-mcg tab Take 1 Tab by mouth daily. 3/1/18   Espinoza Pepe MD   acyclovir (ZOVIRAX) 5 % ointment Apply to affected area 5 times a day 2/21/18   Espinoza Pepe MD     No Known Allergies     Review of Systems:    Constitutional: No fever or chills  Neurologic: No headache  Eyes: No scleral icterus or irritated eyes  Nose: No nasal pain or drainage  Mouth: No oral lesions or sore throat  Cardiac: No palpations or chest pain  Pulmonary: No cough or shortness or breath  Gastrointestinal: Oral intolerance  Genitourinary: No dysuria  Musculoskeletal: No muscle or joint tenderness  Skin: No rashes or lesions  Psychiatric: No anxiety or depressed mood      Objective:     Physical Exam:  Visit Vitals  /69   Pulse 85   Temp 98.7 °F (37.1 °C) (Oral)   Resp 18   Ht 5' 6\" (1.676 m)   Wt 163 lb (73.9 kg)   SpO2 98%   BMI 26.31 kg/m²     General: No acute distress, conversant  Eyes: PERRLA, no scleral icterus  HENT: Normocephalic without oral lesions  Neck: Trachea midline without LAD  Cardiac: Normal pulse rate and rhythm  Pulmonary: Symmetric chest rise with normal effort  GI: Soft, NT, ND, no hernia, no splenomegaly  Skin: Warm without rash  Extremities: No edema or joint stiffness  Psych: Appropriate mood and affect    Assessment:     Dysphagia after gastric bypass concerning for GJ stricture  Plan:   Patient here from out of town.   No follow-up from her bariatric nutritional point of view. Significant weight loss. Unable to tolerate solid p.o. Concern for dehydration. Patient needs to undergo urgent EGD with possible dilation. Concern for GJ stricture and/or marginal ulcer. Hopefully with dilation she can go back to tolerating p.o. and go back to [place]. Patient will be admitted to the hospital for urgent EGD given weight loss, dehydration, and dysphagia and need to check nutritional markers. Patient understands these plans and elects to proceed. Total time involved with this patient's care was: 65 minutes. This involved reviewing patient record, talking with patient, and charting on patient. Tensive time was spent coordinating care with consultants and ultimately admitting the patient.     Signed By: Ema Tejeda MD  Bariatric and General Surgeon  [facility]    April 7, 2021

## 2021-04-08 ENCOUNTER — ANESTHESIA (OUTPATIENT)
Dept: ENDOSCOPY | Age: 41
End: 2021-04-08
Payer: COMMERCIAL

## 2021-04-08 ENCOUNTER — ANESTHESIA EVENT (OUTPATIENT)
Dept: ENDOSCOPY | Age: 41
End: 2021-04-08
Payer: COMMERCIAL

## 2021-04-08 PROBLEM — K28.9 MARGINAL ULCER: Status: ACTIVE | Noted: 2021-04-08

## 2021-04-08 PROCEDURE — 74011250637 HC RX REV CODE- 250/637: Performed by: SURGERY

## 2021-04-08 PROCEDURE — 74011250636 HC RX REV CODE- 250/636: Performed by: NURSE ANESTHETIST, CERTIFIED REGISTERED

## 2021-04-08 PROCEDURE — C9113 INJ PANTOPRAZOLE SODIUM, VIA: HCPCS | Performed by: PHYSICIAN ASSISTANT

## 2021-04-08 PROCEDURE — 74011000250 HC RX REV CODE- 250: Performed by: NURSE PRACTITIONER

## 2021-04-08 PROCEDURE — 74011250636 HC RX REV CODE- 250/636: Performed by: NURSE PRACTITIONER

## 2021-04-08 PROCEDURE — 74011000250 HC RX REV CODE- 250: Performed by: NURSE ANESTHETIST, CERTIFIED REGISTERED

## 2021-04-08 PROCEDURE — 99218 HC RM OBSERVATION: CPT

## 2021-04-08 PROCEDURE — 74011250637 HC RX REV CODE- 250/637: Performed by: INTERNAL MEDICINE

## 2021-04-08 PROCEDURE — 74011250636 HC RX REV CODE- 250/636: Performed by: PHYSICIAN ASSISTANT

## 2021-04-08 PROCEDURE — 2709999900 HC NON-CHARGEABLE SUPPLY: Performed by: INTERNAL MEDICINE

## 2021-04-08 PROCEDURE — 76060000031 HC ANESTHESIA FIRST 0.5 HR: Performed by: INTERNAL MEDICINE

## 2021-04-08 PROCEDURE — 77030021593 HC FCPS BIOP ENDOSC BSC -A: Performed by: INTERNAL MEDICINE

## 2021-04-08 PROCEDURE — 74011000258 HC RX REV CODE- 258: Performed by: SURGERY

## 2021-04-08 PROCEDURE — 99224 PR SBSQ OBSERVATION CARE/DAY 15 MINUTES: CPT | Performed by: SURGERY

## 2021-04-08 PROCEDURE — 76040000019: Performed by: INTERNAL MEDICINE

## 2021-04-08 PROCEDURE — 74011000250 HC RX REV CODE- 250: Performed by: PHYSICIAN ASSISTANT

## 2021-04-08 PROCEDURE — 88305 TISSUE EXAM BY PATHOLOGIST: CPT

## 2021-04-08 PROCEDURE — 74011250636 HC RX REV CODE- 250/636: Performed by: SURGERY

## 2021-04-08 RX ORDER — MIDAZOLAM HYDROCHLORIDE 1 MG/ML
.25-5 INJECTION, SOLUTION INTRAMUSCULAR; INTRAVENOUS
Status: DISCONTINUED | OUTPATIENT
Start: 2021-04-08 | End: 2021-04-08 | Stop reason: HOSPADM

## 2021-04-08 RX ORDER — SUCRALFATE 1 G/1
1 TABLET ORAL
Qty: 120 TAB | Refills: 3 | Status: SHIPPED | OUTPATIENT
Start: 2021-04-08 | End: 2021-05-08

## 2021-04-08 RX ORDER — SODIUM CHLORIDE 9 MG/ML
INJECTION, SOLUTION INTRAVENOUS
Status: DISCONTINUED | OUTPATIENT
Start: 2021-04-08 | End: 2021-04-08 | Stop reason: HOSPADM

## 2021-04-08 RX ORDER — HYDROMORPHONE HYDROCHLORIDE 1 MG/ML
1 INJECTION, SOLUTION INTRAMUSCULAR; INTRAVENOUS; SUBCUTANEOUS
Status: DISCONTINUED | OUTPATIENT
Start: 2021-04-08 | End: 2021-04-09 | Stop reason: HOSPADM

## 2021-04-08 RX ORDER — LIDOCAINE HYDROCHLORIDE 20 MG/ML
INJECTION, SOLUTION EPIDURAL; INFILTRATION; INTRACAUDAL; PERINEURAL AS NEEDED
Status: DISCONTINUED | OUTPATIENT
Start: 2021-04-08 | End: 2021-04-08 | Stop reason: HOSPADM

## 2021-04-08 RX ORDER — OXYCODONE AND ACETAMINOPHEN 5; 325 MG/1; MG/1
1 TABLET ORAL
Status: DISCONTINUED | OUTPATIENT
Start: 2021-04-08 | End: 2021-04-09 | Stop reason: HOSPADM

## 2021-04-08 RX ORDER — ATROPINE SULFATE 0.1 MG/ML
0.5 INJECTION INTRAVENOUS
Status: DISCONTINUED | OUTPATIENT
Start: 2021-04-08 | End: 2021-04-08 | Stop reason: HOSPADM

## 2021-04-08 RX ORDER — SODIUM CHLORIDE 0.9 % (FLUSH) 0.9 %
5-40 SYRINGE (ML) INJECTION EVERY 8 HOURS
Status: DISCONTINUED | OUTPATIENT
Start: 2021-04-08 | End: 2021-04-09 | Stop reason: HOSPADM

## 2021-04-08 RX ORDER — DEXTROMETHORPHAN/PSEUDOEPHED 2.5-7.5/.8
1.2 DROPS ORAL
Status: DISCONTINUED | OUTPATIENT
Start: 2021-04-08 | End: 2021-04-08 | Stop reason: HOSPADM

## 2021-04-08 RX ORDER — SODIUM CHLORIDE 9 MG/ML
150 INJECTION, SOLUTION INTRAVENOUS CONTINUOUS
Status: DISCONTINUED | OUTPATIENT
Start: 2021-04-08 | End: 2021-04-08

## 2021-04-08 RX ORDER — EPINEPHRINE 0.1 MG/ML
1 INJECTION INTRACARDIAC; INTRAVENOUS
Status: DISCONTINUED | OUTPATIENT
Start: 2021-04-08 | End: 2021-04-08 | Stop reason: HOSPADM

## 2021-04-08 RX ORDER — PROPOFOL 10 MG/ML
INJECTION, EMULSION INTRAVENOUS AS NEEDED
Status: DISCONTINUED | OUTPATIENT
Start: 2021-04-08 | End: 2021-04-08 | Stop reason: HOSPADM

## 2021-04-08 RX ORDER — SODIUM CHLORIDE 0.9 % (FLUSH) 0.9 %
5-40 SYRINGE (ML) INJECTION AS NEEDED
Status: DISCONTINUED | OUTPATIENT
Start: 2021-04-08 | End: 2021-04-09 | Stop reason: HOSPADM

## 2021-04-08 RX ORDER — SUCRALFATE 1 G/1
1 TABLET ORAL 4 TIMES DAILY
Status: DISCONTINUED | OUTPATIENT
Start: 2021-04-08 | End: 2021-04-09 | Stop reason: HOSPADM

## 2021-04-08 RX ORDER — ERGOCALCIFEROL 1.25 MG/1
50000 CAPSULE ORAL
Qty: 30 CAP | Refills: 5 | Status: SHIPPED | OUTPATIENT
Start: 2021-04-14 | End: 2021-10-21

## 2021-04-08 RX ORDER — PANTOPRAZOLE SODIUM 40 MG/1
40 TABLET, DELAYED RELEASE ORAL 2 TIMES DAILY
Qty: 60 TAB | Refills: 4 | Status: SHIPPED | OUTPATIENT
Start: 2021-04-08 | End: 2021-10-21

## 2021-04-08 RX ORDER — FENTANYL CITRATE 50 UG/ML
200 INJECTION, SOLUTION INTRAMUSCULAR; INTRAVENOUS
Status: DISCONTINUED | OUTPATIENT
Start: 2021-04-08 | End: 2021-04-08 | Stop reason: HOSPADM

## 2021-04-08 RX ORDER — SUCRALFATE 1 G/10ML
1 SUSPENSION ORAL 4 TIMES DAILY
Status: DISCONTINUED | OUTPATIENT
Start: 2021-04-08 | End: 2021-04-08 | Stop reason: CLARIF

## 2021-04-08 RX ADMIN — SODIUM CHLORIDE 40 MG: 9 INJECTION INTRAMUSCULAR; INTRAVENOUS; SUBCUTANEOUS at 21:31

## 2021-04-08 RX ADMIN — THIAMINE HYDROCHLORIDE: 100 INJECTION, SOLUTION INTRAMUSCULAR; INTRAVENOUS at 18:04

## 2021-04-08 RX ADMIN — Medication 10 ML: at 13:40

## 2021-04-08 RX ADMIN — PROPOFOL 50 MG: 10 INJECTION, EMULSION INTRAVENOUS at 14:27

## 2021-04-08 RX ADMIN — PROPOFOL 50 MG: 10 INJECTION, EMULSION INTRAVENOUS at 14:26

## 2021-04-08 RX ADMIN — LIDOCAINE HYDROCHLORIDE 60 MG: 20 INJECTION, SOLUTION EPIDURAL; INFILTRATION; INTRACAUDAL; PERINEURAL at 14:23

## 2021-04-08 RX ADMIN — Medication 10 ML: at 21:46

## 2021-04-08 RX ADMIN — PROPOFOL 50 MG: 10 INJECTION, EMULSION INTRAVENOUS at 14:28

## 2021-04-08 RX ADMIN — ZOLPIDEM TARTRATE 5 MG: 5 TABLET ORAL at 21:46

## 2021-04-08 RX ADMIN — POTASSIUM CHLORIDE, DEXTROSE MONOHYDRATE AND SODIUM CHLORIDE 125 ML/HR: 150; 5; 450 INJECTION, SOLUTION INTRAVENOUS at 07:10

## 2021-04-08 RX ADMIN — SODIUM CHLORIDE 40 MG: 9 INJECTION INTRAMUSCULAR; INTRAVENOUS; SUBCUTANEOUS at 10:50

## 2021-04-08 RX ADMIN — PROPOFOL 50 MG: 10 INJECTION, EMULSION INTRAVENOUS at 14:30

## 2021-04-08 RX ADMIN — PROPOFOL 100 MG: 10 INJECTION, EMULSION INTRAVENOUS at 14:24

## 2021-04-08 RX ADMIN — SODIUM CHLORIDE 125 MG: 9 INJECTION, SOLUTION INTRAVENOUS at 10:04

## 2021-04-08 RX ADMIN — PROPOFOL 100 MG: 10 INJECTION, EMULSION INTRAVENOUS at 14:23

## 2021-04-08 RX ADMIN — SODIUM CHLORIDE: 900 INJECTION, SOLUTION INTRAVENOUS at 14:01

## 2021-04-08 RX ADMIN — SUCRALFATE 1 G: 1 TABLET ORAL at 17:18

## 2021-04-08 RX ADMIN — SUCRALFATE 1 G: 1 TABLET ORAL at 21:31

## 2021-04-08 RX ADMIN — Medication 10 ML: at 21:32

## 2021-04-08 NOTE — ROUTINE PROCESS
TRANSFER - OUT REPORT:    Verbal report given to Richard Sheikh RN (name) on June Harness  being transferred to  [facility] room 604 (unit) for routine progression of care       Report consisted of patients Situation, Background, Assessment and   Recommendations(SBAR). Information from the following report(s) SBAR, ED Summary and MAR was reviewed with the receiving nurse. Lines:       Opportunity for questions and clarification was provided.       Patient transported with:   [employer]

## 2021-04-08 NOTE — PROCEDURES
[facility] [address] Esc[address] M.D.  Jing Augusto, [address] [facility]  [phone]               Esophagogastroduodenoscopy (EGD) Procedure Note    NAME: Hilaria Hall  :  1980  MRN:  [id number]    Indications:  history of gastric bypass, nausea/vomiting     : Ishaan Yun MD    Referring Provider:  None    Staff: Endoscopy Technician-1: Eyad Ledezma  Endoscopy RN-1: Aldair Zepeda RN    Prosthetic devices, grafts, tissues, transplant, or devices implanted: none    Medicine:  MAC anesthesia      Procedure Details:  After informed consent was obtained with all risks and benefits of the procedure explained and preprocedure exam completed, the patient was placed in the left lateral decubitus position. Universal protocol for patient identification was performed and documented in the nursing notes. Throughout the procedure, the patient's blood pressure was monitored at least every five minutes; pulse, and oxygen saturations were monitored continuously. All vital signs were documented in the nursing notes. The endoscope was inserted into the mouth and advanced under direct vision to proximal jejunum. A careful inspection was made as the gastroscope was withdrawn, including a retroflexed view of the proximal stomach; findings and interventions are described below.       Findings:   Esophagus:normal  Stomach: 12 cm pouch, ~ 12 mm anastomotic ulcer with mild stenosis (unable to dilate due to ulcer) s/p biopsies, rest of the pouch was normal s/p biopsies  Jejunum: normal    Interventions:    biopsy of stomach    Specimens:   ID Type Source Tests Collected by Time Destination   1 : Anastamotic ulcer bx Preservative Anastamosis   Jon Franklin MD 2021 1429 Pathology   2 : Gastric Bx Preservative Gastric  Jon Franklin MD 2021 1431 Pathology        EBL: None          Complications:     No immediate complications        Impression:  -See post-procedure diagnoses. Anastomotic ulcer. Recommendations:  -Await pathology  -PPI 40 mg BID  -Carafate  -Avoid NSAIDs  -Soft mechanical diet, advance as tolerated  -Will sign off. Please call with any questions. Signed by:  Damon Gardiner MD         4/8/2021 2:37 PM

## 2021-04-08 NOTE — CONSULTS
[address]   [address] [facility] [address]        GASTROENTEROLOGY CONSULTATION NOTE  Will Lizzeth Mojica  [phone] office  [phone] NP/PA in-hospital cell phone M-F until 4:30PM  After 5PM or on weekends, please call  for physician on call        NAME:  Hilaria Hall   :   1980   MRN:   [id number]       Referring Physician: Dr. Madeleine Bailey Date: 2021 9:34 AM    Chief Complaint: dysphagia     History of Present Illness:  Patient is a 36 y.o. who is seen in consultation at the request of Dr. Celine Joya for EGD for bariatric dysphagia after bypass. Patient has a past medical history significant for gastric bypass in  by Dr. Ovidio Bobo. History of subsequent emergent surgery for perforation (unclear from marginal ulcer perforation or small bowel perforation). History of EGD with dilation of anastomotic stricture by Dr. Cassius Bell in . Patient reports having similar symptoms at that time. Patient complains of dysphagia associated with pills and solids for the last approximately 2 years. She reports food sitting in the epigastrium with associated epigastric abdominal pain described as a burning sensation. There is associated nausea. She reports self-induced vomiting. No hematemesis. She reports abdominal bloating. Patient reports that she is only able to tolerate some of a liquid diet. History of constipation. Patient reports having a bowel movement every 2 weeks. No hematochezia or melena. No fevers. + Weight loss (40 lbs over the last year). Per report, she was started on a PPI last week with no significant improvement. No NSAID use. No anticoagulation. No alcohol or tobacco use. No history of colonoscopy. I have reviewed the emergency room note, hospital admission note, notes by all other clinicians who have seen the patient during this hospitalization to date.  I have reviewed the problem list and the reason for this hospitalization. I have reviewed the allergies and the medications the patient was taking at home prior to this hospitalization. PMH:  Past Medical History:   Diagnosis Date    Anemia     GERD (gastroesophageal reflux disease)     Hepatitis C 2010    treated    Hx of abnormal Pap smear     Ill-defined condition     anemia hx, has received several blood transfusion, last blood transfusion May 5 2014    Intestinal perforation ([facility] [address]        PSH:  Past Surgical History:   Procedure Laterality Date    HX GASTRIC BYPASS      HX GI  2009    perforated intestines    HX GYN       x 4       Allergies:  No Known Allergies    Home Medications:  Prior to Admission Medications   Prescriptions Last Dose Informant Patient Reported? Taking?   acyclovir (ZOVIRAX) 400 mg tablet Not Taking at Unknown time  No No   Sig: Take on tablet by mouth 3x day for 5 days   acyclovir (ZOVIRAX) 5 % ointment Not Taking at Unknown time  No No   Sig: Apply to affected area 5 times a day   ethinyl estradiol-etonogestrel (NUVARING) 0.12-0.015 mg/24 hr vaginal ring Not Taking at Unknown time  No No   Sig: Insert one ring per vagina, leave in place for 3 weeks and remove for 1 week   norgestimate-ethinyl estradiol ([address]) 0.25-35 mg-mcg tab Not Taking at Unknown time  No No   Sig: Take 1 Tab by mouth daily.       Facility-Administered Medications: None       Hospital Medications:  Current Facility-Administered Medications   Medication Dose Route Frequency    dextrose 5% - 0.45% NaCl with KCl 20 mEq/L infusion  125 mL/hr IntraVENous CONTINUOUS    sodium chloride (NS) flush 5-40 mL  5-40 mL IntraVENous Q8H    sodium chloride (NS) flush 5-40 mL  5-40 mL IntraVENous PRN    promethazine (PHENERGAN) tablet 12.5 mg  12.5 mg Oral Q6H PRN    Or    ondansetron (ZOFRAN) injection 4 mg  4 mg IntraVENous Q4H PRN    ergocalciferol capsule 50,000 Units  50,000 Units Oral Q7D    ferric gluconate (FERRLECIT) 125 mg in 0.9% sodium chloride 100 mL IVPB  125 mg IntraVENous DAILY AFTER BREAKFAST    zolpidem (AMBIEN) tablet 5 mg  5 mg Oral QHS PRN       Social History:  Social History     Tobacco Use    Smoking status: Former Smoker    Smokeless tobacco: Never Used   Substance Use Topics    Alcohol use: No       Family History:  Family History   Problem Relation Age of Onset    Diabetes Maternal Grandmother        Review of Systems:  Constitutional: negative fever, negative chills, + weight loss  Eyes:   negative visual changes  ENT:   negative sore throat, tongue or lip swelling  Respiratory:  negative cough, negative dyspnea  Cards:  negative for chest pain, palpitations, lower extremity edema  GI:   See HPI  :  negative for frequency, dysuria  Integument:  negative for rash and pruritus  Heme:  negative for easy bruising and gum/nose bleeding  Musculoskeletal:negative for myalgias, back pain and muscle weakness  Neuro:    negative for headaches, dizziness  Psych: negative for feelings of anxiety, depression       Objective:     Patient Vitals for the past 8 hrs:   BP Temp Pulse Resp SpO2   04/08/21 0229 (!) 100/59 98.4 °F (36.9 °C) 78 16 99 %     No intake/output data recorded. No intake/output data recorded. EXAM:     CONST:  Pleasant female lying in bed, no acute distress   NEURO:  Alert and oriented x 3   HEENT: EOMI, no scleral icterus   LUNGS: No acute respiratory distress   ABD:  Soft, non distended, no tenderness, no rebound, no guarding. + Bowel sounds. EXT:  Warm   PSYCH: Not anxious or agitated       Data Review     Recent Labs     04/07/21  1719   WBC 6.7   HGB 9.6*   HCT 32.5*        Recent Labs     04/07/21  1719      K 3.8      CO2 26   BUN 14   CREA 0.76   GLU 83   CA 9.4     Recent Labs     04/07/21  1719   AP 42*   TP 7.2   ALB 3.4*   GLOB 3.8     No results for input(s): INR, PTP, APTT, INREXT in the last 72 hours.        Assessment:   · Dysphagia, epigastric abdominal pain, inability to tolerate PO intake: WBC 6.7, Hgb 9.6, LFTs normal, iron 16, iron saturation 4%, TIBC 376. · History of anastomotic stricture status post dilation in 2007 by Dr. Susan Sampson  · History of gastric bypass in 2005     Patient Active Problem List   Diagnosis Code    Dysphagia R13.10    Vitamin D deficiency E55.9    Microcytic anemia D50.9     Plan:     · NPO  · PPI  · Supportive measures  · Plan for EGD today with Dr. Jm Bermudez. Details and risks of the procedure to include (but not limited to) anesthesia, bleeding, infection, and perforation were discussed. Patient understands and is in agreement with the plan. · Rapid COVID test negative on 4/7  · Surgery following  · Patient was discussed with and will be seen by Dr. Jm Bermudez  · Thank you for allowing me to participate in care of Yovany     Signed By: Gabby Cruz     4/8/2021  9:34 AM       Gastroenterology Attending Physician attestation statement and comments. This patient was seen and examined by me in a face-to-face visit today. I reviewed the medical record including lab work, imaging and other provider notes. I confirmed the history as described above. I spoke to the patient, reviewed the medical record including lab work, imaging and other provider notes. I discussed this case in detail with Jer HICKMAN. I formulated an  assessment of this patient and developed a treatment plan. I agree with the above consultation note. I agree with the history, exam and assessment and plan as outlined in the note. I would like to add the following:     Abd: normoactive BS, nd, mild tenderness in the epigastric area, no rebound/guarding. EGD today to evaluate for anastomotic ulcer or stricture.     Dr. Jm Bermudez

## 2021-04-08 NOTE — H&P
[facility], [address] [place] [address]          Pre-procedure History and Physical       NAME:  Gilbert Gill   :   1980   MRN:   [id number]     CHIEF COMPLAINT/HPI: n/V    PMH:  Past Medical History:   Diagnosis Date    Anemia     GERD (gastroesophageal reflux disease)     Hepatitis C 2010    treated    Hx of abnormal Pap smear     Ill-defined condition     anemia hx, has received several blood transfusion, last blood transfusion May 5 2014    Intestinal perforation ([address]        PSH:  Past Surgical History:   Procedure Laterality Date    HX GASTRIC BYPASS      HX GI  2009    perforated intestines    HX GYN       x 4       Allergies:  No Known Allergies    Home Medications:  Prior to Admission Medications   Prescriptions Last Dose Informant Patient Reported? Taking?   acyclovir (ZOVIRAX) 400 mg tablet Not Taking at Unknown time  No No   Sig: Take on tablet by mouth 3x day for 5 days   acyclovir (ZOVIRAX) 5 % ointment Not Taking at Unknown time  No No   Sig: Apply to affected area 5 times a day   ethinyl estradiol-etonogestrel (NUVARING) 0.12-0.015 mg/24 hr vaginal ring Not Taking at Unknown time  No No   Sig: Insert one ring per vagina, leave in place for 3 weeks and remove for 1 week   norgestimate-ethinyl estradiol ([facility] [address],) 0.25-35 mg-mcg tab Not Taking at Unknown time  No No   Sig: Take 1 Tab by mouth daily.       Facility-Administered Medications: None       Hospital Medications:  Current Facility-Administered Medications   Medication Dose Route Frequency    pantoprazole (PROTONIX) 40 mg in 0.9% sodium chloride 10 mL injection  40 mg IntraVENous Q12H    dextrose 5% - 0.45% NaCl with KCl 20 mEq/L infusion  125 mL/hr IntraVENous CONTINUOUS    sodium chloride (NS) flush 5-40 mL  5-40 mL IntraVENous Q8H    sodium chloride (NS) flush 5-40 mL  5-40 mL IntraVENous PRN    promethazine (PHENERGAN) tablet 12.5 mg  12.5 mg Oral Q6H PRN    Or    ondansetron (ZOFRAN) injection 4 mg  4 mg IntraVENous Q4H PRN    ergocalciferol capsule 50,000 Units  50,000 Units Oral Q7D    ferric gluconate (FERRLECIT) 125 mg in 0.9% sodium chloride 100 mL IVPB  125 mg IntraVENous DAILY AFTER BREAKFAST    zolpidem (AMBIEN) tablet 5 mg  5 mg Oral QHS PRN     Facility-Administered Medications Ordered in Other Encounters   Medication Dose Route Frequency    0.9% sodium chloride infusion   IntraVENous CONTINUOUS       Family History:  Family History   Problem Relation Age of Onset    Diabetes Maternal Grandmother        Social History:  Social History     Tobacco Use    Smoking status: Former Smoker    Smokeless tobacco: Never Used   Substance Use Topics    Alcohol use: No       The patient was counseled at length about the risks of ailin Covid-19 in the ramy-operative and post-operative states including the recovery window of their procedure. The patient was made aware that ailin Covid-19 after a surgical procedure may worsen their prognosis for recovering from the virus and lend to a higher morbidity and or mortality risk. The patient was given the options of postponing their procedure. All of the risks, benefits, and alternatives were discussed. The patient does  wish to proceed with the procedure. PHYSICAL EXAM PRIOR TO SEDATION:  General: Alert, in no acute distress    Lungs:            CTA bilaterally  Heart:  Normal S1, S2    Abdomen: Soft, Non distended, Non tender. Normoactive bowel sounds. Assessment:   Stable for sedation administration.     Plan:     · Endoscopic procedure with sedation     Signed By: Francisco Shepard MD     4/8/2021  2:21 PM

## 2021-04-08 NOTE — PERIOP NOTES

## 2021-04-08 NOTE — DISCHARGE INSTRUCTIONS
Continue Protonix 40 mg twice a day for ulcer. Carafate 4 times a day before meals and before bed. Mixed tablet with water to make a slurry and then drink. Avoid smoking, steroids, and NSAIDs. Okay for diet as tolerated    Take multivitamin, calcium, vitamin D, and B12 as instructed to avoid nutritional deficiency. We will need repeat labs in the near future when you see Masha Lopez / Dr Andria Jaimes in about 2 months.     Plan for repeat endoscopy in 3 months to monitor progress

## 2021-04-08 NOTE — PROGRESS NOTES
Surgery Progress Note    4/8/2021    Admit Date: 4/7/2021    CC: Epigastric pain      Subjective: Ate a hamburger last night before admission. Admit to steroids recently. Not taking nutritional replacement. Constitutional: No fever or chills  Neurologic: No headache  Eyes: No scleral icterus or irritated eyes  Nose: No nasal pain or drainage  Mouth: No oral lesions or sore throat  Cardiac: No palpations or chest pain  Pulmonary: No cough or shortness or breath  Gastrointestinal: Epigastric pain  Genitourinary: No dysuria  Musculoskeletal: No muscle or joint tenderness  Skin: No rashes or lesions  Psychiatric: No anxiety or depressed mood    Objective:     Visit Vitals  BP (!) 101/53 (BP 1 Location: Right upper arm, BP Patient Position: At rest)   Pulse 72   Temp 98.4 °F (36.9 °C)   Resp 17   Ht 5' 6\" (1.676 m)   Wt 163 lb 2.3 oz (74 kg)   SpO2 100%   BMI 26.33 kg/m²       General: No acute distress, conversant  Eyes: PERRLA, no scleral icterus  HENT: Normocephalic without oral lesions  Neck: Trachea midline without LAD  Cardiac: Normal pulse rate and rhythm  Pulmonary: Symmetric chest rise with normal effort  GI: Soft, Mild tenderness in epigastrium, ND, no hernia, no splenomegaly  Skin: Warm without rash  Extremities: No edema or joint stiffness  Psych: Appropriate mood and affect    Labs, vital signs, and I/O reviewed. 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       Blake Gordon MD  Bariatric and General Surgeon  [facility]

## 2021-04-08 NOTE — ANESTHESIA POSTPROCEDURE EVALUATION
Post-Anesthesia Evaluation and Assessment    Patient: Anna Pinto MRN: [id number]  SSN: [id number]    YOB: 1980  Age: 36 y.o. Sex: female      I have evaluated the patient and they are stable and ready for discharge from the PACU. Cardiovascular Function/Vital Signs  Visit Vitals  /77 (BP 1 Location: Left upper arm, BP Patient Position: At rest)   Pulse 67   Temp 36.1 °C (97 °F)   Resp 17   Ht 5' 6\" (1.676 m)   Wt 74 kg (163 lb 2.3 oz)   SpO2 100%   BMI 26.33 kg/m²       Patient is status post MAC anesthesia for Procedure(s):  ESOPHAGOGASTRODUODENOSCOPY (EGD)  ESOPHAGOGASTRODUODENAL (EGD) BIOPSY. Nausea/Vomiting: None    Postoperative hydration reviewed and adequate. Pain:  Pain Scale 1: Visual (04/08/21 1450)  Pain Intensity 1: 0 (04/08/21 1450)   Managed    Neurological Status: At baseline    Mental Status, Level of Consciousness: Alert and  oriented to person, place, and time    Pulmonary Status:   O2 Device: Room air (04/08/21 1450)   Adequate oxygenation and airway patent    Complications related to anesthesia: None    Post-anesthesia assessment completed. No concerns    Signed By: Palma Aguiar MD     April 8, 2021              Procedure(s):  ESOPHAGOGASTRODUODENOSCOPY (EGD)  ESOPHAGOGASTRODUODENAL (EGD) BIOPSY.     MAC    <BSHSIANPOST>    INITIAL Post-op Vital signs:   Vitals Value Taken Time   /77 04/08/21 1517   Temp 36.1 °C (97 °F) 04/08/21 1517   Pulse 67 04/08/21 1517   Resp 17 04/08/21 1517   SpO2 100 % 04/08/21 1517

## 2021-04-08 NOTE — PERIOP NOTES
TRANSFER - OUT REPORT:    Verbal report given to Sidney Alexander RN on Krystal Lester  being transferred to [address] [place] [address] for routine progression of care       Report consisted of patients Situation, Background, Assessment and   Recommendations(SBAR). Information from the following report(s) SBAR, Procedure Summary, Cardiac Rhythm SR and Quality Measures was reviewed with the receiving nurse. Lines:   Peripheral IV 04/08/21 Right Antecubital (Active)        Opportunity for questions and clarification was provided.       Patient transported with:   Tech, Chart, IV, IV pump infusing @125ml/hr

## 2021-04-08 NOTE — ANESTHESIA PREPROCEDURE EVALUATION
Relevant Problems   HEMATOLOGY   (+) Microcytic anemia       Anesthetic History   No history of anesthetic complications            Review of Systems / Medical History  Patient summary reviewed, nursing notes reviewed and pertinent labs reviewed    Pulmonary  Within defined limits                 Neuro/Psych   Within defined limits           Cardiovascular  Within defined limits                Exercise tolerance: >4 METS     GI/Hepatic/Renal     GERD: well controlled          Comments: S/p PGB in 2004  abd pain, vomiting Endo/Other  Within defined limits      Anemia     Other Findings   Comments: Anemia      GERD (gastroesophageal reflux disease)     Hepatitis- treated           Physical Exam    Airway  Mallampati: II  TM Distance: > 6 cm  Neck ROM: normal range of motion   Mouth opening: Normal     Cardiovascular  Regular rate and rhythm,  S1 and S2 normal,  no murmur, click, rub, or gallop             Dental  No notable dental hx       Pulmonary  Breath sounds clear to auscultation               Abdominal  GI exam deferred       Other Findings            Anesthetic Plan    ASA: 2  Anesthesia type: MAC          Induction: Intravenous  Anesthetic plan and risks discussed with: Patient

## 2021-04-08 NOTE — ROUTINE PROCESS
Nuria Copper  1980  [id number]    Situation:  Verbal report received from: Torres Comer  Procedure: Procedure(s):  ESOPHAGOGASTRODUODENOSCOPY (EGD)  ESOPHAGOGASTRODUODENAL (EGD) BIOPSY    Background:    Preoperative diagnosis: dysphagia, abd pain  Postoperative diagnosis: 1)Gastric bypass anatomy  2)Anastamotc ulcer    :  Dr. Jus Reis  Assistant(s): Endoscopy Technician-1: Owen Bernal  Endoscopy RN-1: Dyllan Villar RN    Specimens:   ID Type Source Tests Collected by Time Destination   1 : Anastamotic ulcer bx Preservative Anastamosis   Kya Orosco MD 4/8/2021 1429 Pathology   2 : Gastric Bx Preservative Gastric  Kya Orosco MD 4/8/2021 1431 Pathology     H. Pylori  no    Assessment:  Intra-procedure medications   Anesthesia gave intra-procedure sedation and medications, see anesthesia flow sheet    Intravenous fluids: NS@ KVO     Vital signs stable     Abdominal assessment: round and soft     Recommendation:  Return to room 604

## 2021-04-08 NOTE — PROGRESS NOTES
Problem: General Medical Care Plan  Goal: *Vital signs within specified parameters  Outcome: Progressing Towards Goal  Goal: *Labs within defined limits  Outcome: Progressing Towards Goal  Goal: *Absence of infection signs and symptoms  Outcome: Progressing Towards Goal  Goal: *Optimal pain control at patient's stated goal  Outcome: Progressing Towards Goal  Goal: *Skin integrity maintained  Outcome: Progressing Towards Goal  Goal: *Fluid volume balance  Outcome: Progressing Towards Goal  Goal: *Optimize nutritional status  Outcome: Progressing Towards Goal  Goal: *Anxiety reduced or absent  Outcome: Progressing Towards Goal  Goal: *Progressive mobility and function (eg: ADL's)  Outcome: Progressing Towards Goal     Problem: Patient Education: Go to Patient Education Activity  Goal: Patient/Family Education  Outcome: Progressing Towards Goal     Problem: Risk for Spread of Infection  Goal: Prevent transmission of infectious organism to others  Description: Prevent the transmission of infectious organisms to other patients, staff members, and visitors.   Outcome: Progressing Towards Goal     Problem: Patient Education:  Go to Education Activity  Goal: Patient/Family Education  Outcome: Progressing Towards Goal

## 2021-04-09 VITALS
SYSTOLIC BLOOD PRESSURE: 123 MMHG | TEMPERATURE: 98.1 F | HEART RATE: 66 BPM | DIASTOLIC BLOOD PRESSURE: 76 MMHG | OXYGEN SATURATION: 100 % | RESPIRATION RATE: 18 BRPM | BODY MASS INDEX: 26.22 KG/M2 | HEIGHT: 66 IN | WEIGHT: 163.14 LBS

## 2021-04-09 LAB
BACTERIA SPEC CULT: NORMAL
BACTERIA SPEC CULT: NORMAL
SERVICE CMNT-IMP: NORMAL

## 2021-04-09 PROCEDURE — 74011250637 HC RX REV CODE- 250/637: Performed by: INTERNAL MEDICINE

## 2021-04-09 PROCEDURE — 99218 HC RM OBSERVATION: CPT

## 2021-04-09 PROCEDURE — 74011250636 HC RX REV CODE- 250/636: Performed by: PHYSICIAN ASSISTANT

## 2021-04-09 PROCEDURE — C9113 INJ PANTOPRAZOLE SODIUM, VIA: HCPCS | Performed by: PHYSICIAN ASSISTANT

## 2021-04-09 PROCEDURE — 74011000250 HC RX REV CODE- 250: Performed by: PHYSICIAN ASSISTANT

## 2021-04-09 PROCEDURE — 99233 SBSQ HOSP IP/OBS HIGH 50: CPT | Performed by: SURGERY

## 2021-04-09 RX ORDER — TRAMADOL HYDROCHLORIDE 50 MG/1
50-100 TABLET ORAL
Qty: 10 TAB | Refills: 0 | Status: SHIPPED | OUTPATIENT
Start: 2021-04-09 | End: 2021-04-12

## 2021-04-09 RX ADMIN — SODIUM CHLORIDE 40 MG: 9 INJECTION INTRAMUSCULAR; INTRAVENOUS; SUBCUTANEOUS at 10:09

## 2021-04-09 RX ADMIN — SUCRALFATE 1 G: 1 TABLET ORAL at 10:09
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
## 2021-04-09 NOTE — PROGRESS NOTES
IV removed and discharge education completed. Time for questions and clarification was provided. Patient was discharged via wheelchair.

## 2021-04-15 ENCOUNTER — TELEPHONE (OUTPATIENT)
Dept: SURGERY | Age: 41
End: 2021-04-15

## 2021-04-16 NOTE — TELEPHONE ENCOUNTER
Spoke with patient regarding letter to return to work on 4/24/21. Patient will louann back with fax number.

## 2021-04-18 LAB — VIT B1 BLD-SCNC: 68.2 NMOL/L (ref 66.5–200)

## 2021-04-19 NOTE — TELEPHONE ENCOUNTER
Spoke with patient who states,she will  letter for work on Friday. Patient has appointment with Avila Briscoe NP on Friday.

## 2021-04-26 ENCOUNTER — OFFICE VISIT (OUTPATIENT)
Dept: SURGERY | Age: 41
End: 2021-04-26
Payer: COMMERCIAL

## 2021-04-26 VITALS
HEART RATE: 73 BPM | OXYGEN SATURATION: 97 % | WEIGHT: 165 LBS | TEMPERATURE: 98 F | DIASTOLIC BLOOD PRESSURE: 65 MMHG | SYSTOLIC BLOOD PRESSURE: 100 MMHG | HEIGHT: 66 IN | BODY MASS INDEX: 26.52 KG/M2 | RESPIRATION RATE: 18 BRPM

## 2021-04-26 DIAGNOSIS — F41.9 ANXIETY: ICD-10-CM

## 2021-04-26 DIAGNOSIS — E63.9 NUTRITIONAL DEFICIENCY: Primary | ICD-10-CM

## 2021-04-26 DIAGNOSIS — F51.01 PRIMARY INSOMNIA: ICD-10-CM

## 2021-04-26 PROCEDURE — 99215 OFFICE O/P EST HI 40 MIN: CPT | Performed by: SURGERY

## 2021-04-26 RX ORDER — ZOLPIDEM TARTRATE 5 MG/1
5 TABLET ORAL
Qty: 20 TAB | Refills: 1 | Status: SHIPPED | OUTPATIENT
Start: 2021-04-26 | End: 2021-10-21

## 2021-04-26 NOTE — PROGRESS NOTES
1. Have you been to the ER, urgent care clinic since your last visit? Hospitalized since your last  No    2. Have you seen or consulted any other health care providers outside of the [address] [place] [address] since your last visit? Include any pap smears or colon screening. No      Asking for something for sleep & anxiety. States although she is taking medication to improve appetite, it has not helped. Complains of not being able to gain weight.

## 2021-04-26 NOTE — PROGRESS NOTES
Surgery Progress Note    4/26/2021    CC: Cannot sleep    Subjective:     Patient following up from last month after being diagnosed marginal ulcer. She also had a significant anxiety attack during the hospitalization and confided in me for over 30 minutes regarding past and current trauma. The patient reports being compliant with nutritional supplementation at home and doing well. She has gained 2 pounds. However, she would like to gain more weight. She is fearful that she is becoming too small. She reports insomnia over the last few weeks and has been taking high-dose melatonin with no success along with Atarax. She has no dysphagia and no epigastric pain. She did have 1 bout of emesis yesterday that was frothy in nature. She started a new job today. She works within [employer] system apparently. Patient is interested in getting back in touch with the bariatric support program.  She is able to tolerate all diet but does not do well with large pills. Has been taking over-the-counter pills from [place] to help with her appetite. She denies having the urge to eat and has to force herself to eat.     Constitutional: No fever or chills  Neurologic: No headache  Eyes: No scleral icterus or irritated eyes  Nose: No nasal pain or drainage  Mouth: No oral lesions or sore throat  Cardiac: No palpations or chest pain  Pulmonary: No cough or shortness or breath  Gastrointestinal: Emesis yesterday  Genitourinary: No dysuria  Musculoskeletal: No muscle or joint tenderness  Skin: No rashes or lesions  Psychiatric: No anxiety or depressed mood    Objective:     Visit Vitals  /65   Pulse 73   Temp 98 °F (36.7 °C) (Oral)   Resp 18   Ht 5' 6\" (1.676 m)   Wt 165 lb (74.8 kg)   SpO2 97%   BMI 26.63 kg/m²       General: No acute distress, conversant  Eyes: PERRLA, no scleral icterus  HENT: Normocephalic without oral lesions  Neck: Trachea midline without LAD  Cardiac: Normal pulse rate and rhythm  Pulmonary: Symmetric chest rise with normal effort  GI: Soft, NT, ND, no hernia, no splenomegaly  Skin: Warm without rash  Extremities: No edema or joint stiffness  Psych: Appropriate mood and affect, no pressured speech, appears to be thinking clearly. Assessment:     70-year-old female with significant longstanding psychological trauma status post gastric bypass many years ago with recent diagnosis of marginal ulcer that is improving. Plan:     Chronic anxiety-referral to psychology. Patient is very much against taking medication. History of gastric bypass-marginal ulcer appears to be doing well. Pain resolved. Continue PPI and Carafate. Follow-up in 2 months for follow-up with Lashawn Wilkinson and referral to GI for repeat scope. Remain compliant with medications. She reports taking all of her multivitamins, PPI, and Carafate. Need repeat labs in the future. Her multivitamin does have iron in it she said. Concern for too much weight loss. I believe her lack of appetite in terms of weight management is beneficial.  She is still in the overweight category based on her BMI. I do not recommend any appetite stable at this time. She has gained 2 pounds over the last few weeks. I feel this is appropriate. I did discuss with her that there are medications for her anxiety that may also have the effect of increasing appetite. I encouraged her to discuss this with psychology and possibly psychiatry she wants to pursue this avenue. History of bariatric surgery and possible resentment towards the process-we will try to get her back involved with the bariatric support sessions. [facility] sent to [facility] regarding this. Insomnia-failed response to Benadryl and melatonin. We will try Ambien. Do not want this to become a chronic issue however. .    Follow-up in 2 weeks with Michael Castillo. Total time involved with this patient's care was: 40 minutes.  This involved reviewing patient record, talking with patient, placing referrals and coordinating care, and charting on patient.     Olesya Suh MD  Bariatric and General Surgeon  [facility]

## 2021-04-27 ENCOUNTER — TELEPHONE (OUTPATIENT)
Dept: SURGERY | Age: 41
End: 2021-04-27

## 2021-04-27 NOTE — TELEPHONE ENCOUNTER
Message left for patient to return call to office. Need to discuss referral to psychology per Dr. Liss Briceño. Patient has been referred to [facility]. Per office, patient will need to call closest location to home to schedule appointment as they do not accept referrals.

## 2021-05-10 ENCOUNTER — OFFICE VISIT (OUTPATIENT)
Dept: ENT CLINIC | Age: 41
End: 2021-05-10
Payer: COMMERCIAL

## 2021-05-10 VITALS
SYSTOLIC BLOOD PRESSURE: 120 MMHG | RESPIRATION RATE: 17 BRPM | HEIGHT: 66 IN | HEART RATE: 90 BPM | OXYGEN SATURATION: 98 % | WEIGHT: 165 LBS | BODY MASS INDEX: 26.52 KG/M2 | TEMPERATURE: 97.9 F | DIASTOLIC BLOOD PRESSURE: 78 MMHG

## 2021-05-10 DIAGNOSIS — L72.0 EPIDERMOID CYST OF NECK: ICD-10-CM

## 2021-05-10 DIAGNOSIS — Q17.8 SPLIT EAR LOBE: Primary | ICD-10-CM

## 2021-05-10 PROCEDURE — 99214 OFFICE O/P EST MOD 30 MIN: CPT | Performed by: OTOLARYNGOLOGY

## 2021-05-10 NOTE — LETTER
NOTIFICATION RETURN TO WORK / SCHOOL 
 
5/10/2021 4:07 PM 
 
Ms. Hilaria Hall [address] [place] [id number] To Whom It May Concern: 
 
Hilaria Hall is currently under the care of [facility]. She will return to work on: Tuesday, May 11, 2021. If there are questions or concerns please have the patient contact our office. Sincerely, Vlaerie Coburn MD

## 2021-05-10 NOTE — PROGRESS NOTES
Subjective:    [place] Labrum   36 y.o.   1980     Followup Visit    Location -right ear, left posterior neck    Quality -split earlobe, neck cyst    Severity -   moderate    Duration -years    Timing -ongoing and chronic    Context -patient known to me for nasal fracture reduction in 2019, she had a right split earlobe for a number of years and would like this repaired. More recently for the past few months she has had a skin lesion on the left posterior neck skin that gets painful and swollen at times and wants this excised as well    Modifying Features -none    Associated symptoms/signs -none      Review of Systems  Review of Systems   Constitutional: Negative for chills and fever. HENT: Negative for ear pain, hearing loss, nosebleeds and tinnitus. Eyes: Negative for blurred vision and double vision. Respiratory: Negative for cough, sputum production and shortness of breath. Cardiovascular: Negative for chest pain and palpitations. Gastrointestinal: Negative for heartburn, nausea and vomiting. Musculoskeletal: Negative for joint pain and neck pain. Skin: Positive for itching. Neurological: Negative for dizziness, speech change, weakness and headaches. Endo/Heme/Allergies: Negative for environmental allergies. Does not bruise/bleed easily. Psychiatric/Behavioral: Negative for memory loss. The patient does not have insomnia. Past Medical History:   Diagnosis Date    Anemia     GERD (gastroesophageal reflux disease)     Hepatitis C 2010    treated    Hx of abnormal Pap smear     Ill-defined condition     anemia hx, has received several blood transfusion, last blood transfusion May 5 2014    Intestinal perforation [facility])      Prior to Admission medications    Medication Sig Start Date End Date Taking? Authorizing Provider   zolpidem (AMBIEN) 5 mg tablet Take 1 Tab by mouth nightly as needed for Sleep.  Max Daily Amount: 5 mg. 4/26/21   Therese Perez MD   ergocalciferol (ERGOCALCIFEROL) 1,250 mcg (50,000 unit) capsule Take 1 Cap by mouth every seven (7) days. 4/14/21   Dick Disla MD   pantoprazole (PROTONIX) 40 mg tablet Take 1 Tab by mouth two (2) times a day. 4/8/21   Dick Disla MD   ethinyl estradiol-etonogestrel (NUVARING) 0.12-0.015 mg/24 hr vaginal ring Insert one ring per vagina, leave in place for 3 weeks and remove for 1 week 4/8/19   Esther Gamez MD   acyclovir (ZOVIRAX) 400 mg tablet Take on tablet by mouth 3x day for 5 days 10/8/18   Esther Gamez MD   norgestimate-ethinyl estradiol ([facility] [address]) 0.25-35 mg-mcg tab Take 1 Tab by mouth daily. 3/1/18   Esther Gamez MD   acyclovir (ZOVIRAX) 5 % ointment Apply to affected area 5 times a day 2/21/18   Esther Gamez MD            Objective:     Visit Vitals  /78 (BP 1 Location: Left upper arm, BP Patient Position: Sitting, BP Cuff Size: Adult)   Pulse 90   Temp 97.9 °F (36.6 °C) (Temporal)   Resp 17   Ht 5' 6\" (1.676 m)   Wt 165 lb (74.8 kg)   SpO2 98%   BMI 26.63 kg/m²        Physical Exam  Vitals signs reviewed. Constitutional:       General: She is awake. She is not in acute distress. Appearance: Normal appearance. She is well-groomed and normal weight. HENT:      Head: Normocephalic and atraumatic. Jaw: There is normal jaw occlusion. No trismus, tenderness or malocclusion. Salivary Glands: Right salivary gland is not diffusely enlarged or tender. Left salivary gland is not diffusely enlarged or tender. Right Ear: Hearing, tympanic membrane, ear canal and external ear normal.      Left Ear: Hearing, tympanic membrane, ear canal and external ear normal.      Ears:      [place] exam findings: does not lateralize. Right Rinne: AC > BC. Left Rinne: AC > BC. Comments: Right earlobe split     Nose: No nasal deformity, septal deviation or mucosal edema. Right Nostril: No epistaxis. Left Nostril: No epistaxis.       Right Turbinates: Not enlarged, swollen or pale. Left Turbinates: Not enlarged, swollen or pale. Right Sinus: No maxillary sinus tenderness or frontal sinus tenderness. Left Sinus: No maxillary sinus tenderness or frontal sinus tenderness. Mouth/Throat:      Lips: Pink. No lesions. Mouth: Mucous membranes are moist. No oral lesions. Dentition: Normal dentition. No dental caries. Tongue: No lesions. Palate: No mass and lesions. Pharynx: Oropharynx is clear. Uvula midline. No oropharyngeal exudate or posterior oropharyngeal erythema. Tonsils: No tonsillar exudate. 0 on the right. 0 on the left. Eyes:      General: Vision grossly intact. Extraocular Movements: Extraocular movements intact. Right eye: No nystagmus. Left eye: No nystagmus. Conjunctiva/sclera: Conjunctivae normal.      Pupils: Pupils are equal, round, and reactive to light. Neck:      Musculoskeletal: No edema or erythema. Thyroid: No thyroid mass, thyromegaly or thyroid tenderness. Trachea: Trachea and phonation normal. No tracheal tenderness or tracheal deviation. Comments: 8 mm skin cyst of left posterior neck  Cardiovascular:      Rate and Rhythm: Normal rate and regular rhythm. Pulmonary:      Effort: Pulmonary effort is normal. No respiratory distress. Breath sounds: No stridor. Musculoskeletal: Normal range of motion. General: No swelling or tenderness. Lymphadenopathy:      Cervical: No cervical adenopathy. Skin:     General: Skin is warm and dry. Findings: No lesion or rash. Neurological:      General: No focal deficit present. Mental Status: She is alert and oriented to person, place, and time. Mental status is at baseline. Cranial Nerves: Cranial nerves are intact. Coordination: Romberg sign negative. Gait: Gait is intact. Psychiatric:         Mood and Affect: Mood normal.         Behavior: Behavior normal. Behavior is cooperative. Assessment/Plan:     Encounter Diagnoses   Name Primary?  Split ear lobe Yes    Epidermoid cyst of neck      We discussed office repair of the earlobe and excision of the cyst with the posterior neck skin. She wants to proceed with this. We will schedule for procedure visit. No orders of the defined types were placed in this encounter. Bud Foster MD, [address] [facility]  [address] [place] [address] [address]  [place], [facility] [phone]

## 2021-05-10 NOTE — PROGRESS NOTES
Visit Vitals  /78 (BP 1 Location: Left upper arm, BP Patient Position: Sitting, BP Cuff Size: Adult)   Pulse 90   Temp 97.9 °F (36.6 °C) (Temporal)   Resp 17   Ht 5' 6\" (1.676 m)   Wt 165 lb (74.8 kg)   SpO2 98%   BMI 26.63 kg/m²     Chief Complaint   Patient presents with    Follow-up     right earlobe ripped and knot on back of neck

## 2021-06-03 ENCOUNTER — OFFICE VISIT (OUTPATIENT)
Dept: ENT CLINIC | Age: 41
End: 2021-06-03
Payer: COMMERCIAL

## 2021-06-03 VITALS
WEIGHT: 164.8 LBS | SYSTOLIC BLOOD PRESSURE: 104 MMHG | OXYGEN SATURATION: 100 % | RESPIRATION RATE: 16 BRPM | BODY MASS INDEX: 26.48 KG/M2 | DIASTOLIC BLOOD PRESSURE: 54 MMHG | TEMPERATURE: 97.5 F | HEIGHT: 66 IN | HEART RATE: 80 BPM

## 2021-06-03 DIAGNOSIS — L72.0 EPIDERMOID CYST OF NECK: ICD-10-CM

## 2021-06-03 DIAGNOSIS — S01.311D LACERATION OF RIGHT EAR LOBE, SUBSEQUENT ENCOUNTER: ICD-10-CM

## 2021-06-03 DIAGNOSIS — Q17.8 SPLIT EAR LOBE: Primary | ICD-10-CM

## 2021-06-03 PROCEDURE — 12051 INTMD RPR FACE/MM 2.5 CM/<: CPT | Performed by: OTOLARYNGOLOGY

## 2021-06-03 PROCEDURE — 12041 INTMD RPR N-HF/GENIT 2.5CM/<: CPT | Performed by: OTOLARYNGOLOGY

## 2021-06-03 PROCEDURE — 11422 EXC H-F-NK-SP B9+MARG 1.1-2: CPT | Performed by: OTOLARYNGOLOGY

## 2021-06-03 NOTE — PROGRESS NOTES
Chief Complaint   Patient presents with    Procedure     Visit Vitals  BP (!) 104/54 (BP 1 Location: Right upper arm, BP Patient Position: Sitting, BP Cuff Size: Adult)   Pulse 80   Temp 97.5 °F (36.4 °C)   Resp 16   Ht 5' 6\" (1.676 m)   Wt 164 lb 12.8 oz (74.8 kg)   SpO2 100%   BMI 26.60 kg/m²

## 2021-06-03 NOTE — PROGRESS NOTES
Patient presents for office skin procedure. Risks benefits discussed including bleeding, infection, scarring. Written consent is obtained. Excision Benign Neoplasm of Skin with Intermediate Closure    The skin overlying the left posterior neck skin lesion is prepped and draped in usual sterile fashion. A total of 3mL of 1% lidocaine with [id number] parts epinephrine is injected around the lesion. An elliptical incision is made around the lesion, and sharp dissection is carried out around the papular lesion. The lesion is 1.2cm in size. Once the lesion is excised, hemostasis is achieved with bipolar cautery. The wound is then closed in layers using 4-0 vicryl deep suture and 5-0 fast absorbing plain gut suture for the skin. The skin is cleaned, dried and steristrips and sterile dressing is applied. Intermediate repair of laceration of right earlobe, 1.5 cm. (Split earlobe)    Attention now turned to the right earlobe. Skin is cleansed with alcohol and injected with around 1 cc of additional local anesthetic. Patient had a lacerated split earlobe. I excised the margin of the skin to fresh bleeding edges. Bleeding controlled with bipolar cautery. I then reapproximated the new raw edges with a layered closure using 4-0 Vicryl deep suture and 5-0 fast-absorbing plain gut. The skin is cleaned and dried and Steri-Strips are applied. Procedure tolerated well, wound care instructions are given and discussed.

## 2021-06-07 ENCOUNTER — OFFICE VISIT (OUTPATIENT)
Dept: OBGYN CLINIC | Age: 41
End: 2021-06-07

## 2021-06-07 VITALS
HEIGHT: 66 IN | WEIGHT: 164 LBS | BODY MASS INDEX: 26.36 KG/M2 | SYSTOLIC BLOOD PRESSURE: 110 MMHG | DIASTOLIC BLOOD PRESSURE: 60 MMHG

## 2021-06-07 DIAGNOSIS — Z01.419 WELL WOMAN EXAM WITH ROUTINE GYNECOLOGICAL EXAM: Primary | ICD-10-CM

## 2021-06-07 DIAGNOSIS — L68.9 EXCESSIVE HAIR GROWTH: ICD-10-CM

## 2021-06-07 DIAGNOSIS — R92.8 ABNORMAL SCREENING MAMMOGRAM: ICD-10-CM

## 2021-06-07 DIAGNOSIS — Z01.419 WELL WOMAN EXAM WITH ROUTINE GYNECOLOGICAL EXAM: ICD-10-CM

## 2021-06-07 DIAGNOSIS — N91.2 AMENORRHEA: ICD-10-CM

## 2021-06-07 DIAGNOSIS — R53.82 CHRONIC FATIGUE: ICD-10-CM

## 2021-06-07 PROCEDURE — 99396 PREV VISIT EST AGE 40-64: CPT | Performed by: OBSTETRICS & GYNECOLOGY

## 2021-06-07 RX ORDER — VALACYCLOVIR HYDROCHLORIDE 500 MG/1
500 TABLET, FILM COATED ORAL DAILY
Qty: 90 TABLET | Refills: 3 | Status: SHIPPED | OUTPATIENT
Start: 2021-06-07 | End: 2022-01-03 | Stop reason: ALTCHOICE

## 2021-06-07 NOTE — PROGRESS NOTES
Deysi Mason is a ,  39 y.o. female BLACK/ whose LMP was on 2021 who presents for her annual checkup. She is having complaints of hair growth, fatigue, no cycle in two months, irritable. .    Menstrual status:    Her periods have been absent for the last two months, since stopping her OCP. She stopped using it because she constantly had bleeding problems on it. She denies dysmenorrhea. She reports no premenstrual symptoms. The patient is not using HRT. Contraception:    The current method of family planning is none. Sexual history:    She  reports being sexually active and has had partner(s) who are Male. She reports using the following method of birth control/protection: None. Medical conditions:    Since her last annual GYN exam about three or more years ago, she has had the following changes in her health history: none. Pap and Mammogram History:    Her most recent Pap smear was abnormal obtained 4 year(s) ago. The patient had her mammogram today in our office. Breast Cancer History/Substance Abuse:    She has no family history of breast cancer. Osteoporosis History:    Family history does not include a first or second degree relative with osteopenia or osteoporosis. A bone density scan has not been previously obtained. Past Medical History:   Diagnosis Date    Anemia     GERD (gastroesophageal reflux disease)     Hepatitis C     treated    Hx of abnormal Pap smear     Ill-defined condition     anemia hx, has received several blood transfusion, last blood transfusion May 5 2014    Intestinal perforation [facility])      Past Surgical History:   Procedure Laterality Date    HX GASTRIC BYPASS  2005    HX GI  2009    perforated intestines    HX GYN       x 4     Current Outpatient Medications   Medication Sig Dispense Refill    zolpidem (AMBIEN) 5 mg tablet Take 1 Tab by mouth nightly as needed for Sleep. Max Daily Amount: 5 mg. 20 Tab 1    ergocalciferol (ERGOCALCIFEROL) 1,250 mcg (50,000 unit) capsule Take 1 Cap by mouth every seven (7) days. 30 Cap 5    pantoprazole (PROTONIX) 40 mg tablet Take 1 Tab by mouth two (2) times a day. 60 Tab 4    ethinyl estradiol-etonogestrel (NUVARING) 0.12-0.015 mg/24 hr vaginal ring Insert one ring per vagina, leave in place for 3 weeks and remove for 1 week (Patient not taking: Reported on 6/3/2021) 1 Device 0    acyclovir (ZOVIRAX) 400 mg tablet Take on tablet by mouth 3x day for 5 days 15 Tab 3    norgestimate-ethinyl estradiol ([facility] [address]) 0.25-35 mg-mcg tab Take 1 Tab by mouth daily. (Patient not taking: Reported on 6/3/2021) 1 Dose Pack 3    acyclovir (ZOVIRAX) 5 % ointment Apply to affected area 5 times a day 2 g 0     Allergies: Codeine   Social History     Socioeconomic History    Marital status: SINGLE     Spouse name: Not on file    Number of children: Not on file    Years of education: Not on file    Highest education level: Not on file   Occupational History    Not on file   Tobacco Use    Smoking status: Former Smoker    Smokeless tobacco: Never Used   Vaping Use    Vaping Use: Never used   Substance and Sexual Activity    Alcohol use: No    Drug use: No    Sexual activity: Yes     Partners: Male     Birth control/protection: None   Other Topics Concern    Not on file   Social History Narrative    Not on file     Social Determinants of Health     Financial Resource Strain:     Difficulty of Paying Living Expenses:    Food Insecurity:     Worried About [address] in the Last Year:     [address] in the Last Year:    Transportation Needs:     Lack of Transportation (Medical):      Lack of Transportation (Non-Medical):    Physical Activity:     Days of Exercise per Week:     Minutes of Exercise per Session:    Stress:     Feeling of Stress :    Social Connections:     Frequency of Communication with Friends and Family:     Frequency of Social Gatherings with Friends and Family:     Attends L[employer] day Services:     Active Member of Clubs or Organizations:     Attends Club or Organization Meetings:     Marital Status:    Intimate Partner Violence:     Fear of Current or Ex-Partner:     Emotionally Abused:     Physically Abused:     Sexually Abused: Tobacco History:  reports that she has quit smoking. She has never used smokeless tobacco.  Alcohol Abuse:  reports no history of alcohol use. Drug Abuse:  reports no history of drug use.   Patient Active Problem List   Diagnosis Code    Dysphagia R13.10    Vitamin D deficiency E55.9    Microcytic anemia D50.9    Marginal ulcer K28.9    Split ear lobe Q17.8    Epidermoid cyst of neck L72.0         Review of Systems - History obtained from the patient  Constitutional: negative for weight loss, fever, night sweats  HEENT: negative for hearing loss, earache, congestion, snoring, sorethroat  CV: negative for chest pain, palpitations, edema  Resp: negative for cough, shortness of breath, wheezing  GI: negative for change in bowel habits, abdominal pain, black or bloody stools  : negative for frequency, dysuria, hematuria, vaginal discharge  MSK: negative for back pain, joint pain, muscle pain  Breast: negative for breast lumps, nipple discharge, galactorrhea  Skin :negative for itching, rash, hives  Neuro: negative for dizziness, headache, confusion, weakness  Psych: negative for anxiety, depression, change in mood  Heme/lymph: negative for bleeding, bruising, pallor    Physical Exam    Visit Vitals  /60   Ht 5' 6\" (1.676 m)   Wt 164 lb (74.4 kg)   LMP 04/07/2021 (Approximate)   BMI 26.47 kg/m²     Constitutional  · Appearance: well-nourished, well developed, alert, in no acute distress    HENT  · Head and Face: appears normal    Neck  · Inspection/Palpation: normal appearance, no masses or tenderness  Lymph Nodes: no lymphadenopathy present    Chest  · Respiratory Effort: breathing normal    Breasts  · Inspection of Breasts: breasts symmetrical, no skin changes, no discharge present, nipple appearance normal, no skin retraction present  · Palpation of Breasts and Axillae: no masses present on palpation, no breast tenderness  · Axillary Lymph Nodes: no lymphadenopathy present    Gastrointestinal  · Abdominal Examination: abdomen non-tender to palpation, normal bowel sounds, no masses present  · Liver and spleen: no hepatomegaly present, spleen not palpable  · Hernias: no hernias identified    Skin  · General Inspection: no rash, no lesions identified    Neurologic/Psychiatric  · Mental Status:  · Orientation: grossly oriented to person, place and time  · Mood and Affect: mood normal, affect appropriate    Genitourinary  · External Genitalia: normal appearance for age, no discharge present, no tenderness present, no inflammatory lesions present, no masses present, no atrophy present  · Vagina: normal vaginal vault without central or paravaginal defects, no discharge present, no inflammatory lesions present, no masses present  · Bladder: non-tender to palpation  · Urethra: appears normal  · Cervix: normal   · Uterus: normal size, shape and consistency  · Adnexa: no adnexal tenderness present, no adnexal masses present  · Perineum: perineum within normal limits, no evidence of trauma, no rashes or skin lesions present  · Anus: anus within normal limits, no hemorrhoids present  · Inguinal Lymph Nodes: no lymphadenopathy present    Assessment:  Routine gynecologic examination  Her current medical status is satisfactory with amenorrhea, fatigue, hair growth    Plan:  Counseled re: diet, exercise, healthy lifestyle  Return for yearly wellness visits  Rec annual mammogram  Patient Verbalized understanding  Check labs  Pap sent

## 2021-06-08 LAB
FSH SERPL-ACNC: 3 MIU/ML
HCG SERPL QL: NEGATIVE
LH SERPL-ACNC: 3.9 MIU/ML
PROLACTIN SERPL-MCNC: 6.8 NG/ML
T4 FREE SERPL-MCNC: 0.8 NG/DL (ref 0.8–1.5)
TSH SERPL DL<=0.05 MIU/L-ACNC: 1.17 UIU/ML (ref 0.36–3.74)

## 2021-06-09 LAB
DHEA-S SERPL-MCNC: 51.1 UG/DL (ref 57.3–279.2)
ESTRADIOL SERPL-MCNC: 145 PG/ML

## 2021-06-10 LAB
COMMENT, TESC2: NORMAL
CYTOLOGIST CVX/VAG CYTO: ABNORMAL
CYTOLOGY CVX/VAG DOC CYTO: ABNORMAL
CYTOLOGY CVX/VAG DOC THIN PREP: ABNORMAL
CYTOLOGY HISTORY:: ABNORMAL
DX ICD CODE: ABNORMAL
DX ICD CODE: ABNORMAL
HPV I/H RISK 4 DNA CVX QL PROBE+SIG AMP: NEGATIVE
Lab: ABNORMAL
OTHER STN SPEC: ABNORMAL
PATHOLOGIST CVX/VAG CYTO: ABNORMAL
STAT OF ADQ CVX/VAG CYTO-IMP: ABNORMAL
TESTOST FREE SERPL-MCNC: 0.7 PG/ML (ref 0–4.2)
TESTOST SERPL-MCNC: 10 NG/DL (ref 4–50)

## 2021-06-11 RX ORDER — MEDROXYPROGESTERONE ACETATE 10 MG/1
10 TABLET ORAL DAILY
Qty: 10 TABLET | Refills: 0 | Status: SHIPPED | OUTPATIENT
Start: 2021-06-11 | End: 2021-06-21

## 2021-06-11 NOTE — PROGRESS NOTES
Her labs are normal. If she hasn't had a cycle in a few months then she could tale Provera 10 mg every day for 10 days to induce a withdrawal bleed and see if her cycles resume. She could also try a different OCP than what she was on before. Her Pap was ASCUS with negative HPV so technically normal. Needs a Pap in one year instead of 3 years.

## 2021-06-24 ENCOUNTER — HOSPITAL ENCOUNTER (OUTPATIENT)
Dept: MAMMOGRAPHY | Age: 41
Discharge: HOME | End: 2021-06-24
Attending: OBSTETRICS & GYNECOLOGY
Payer: COMMERCIAL

## 2021-06-24 DIAGNOSIS — R92.8 ABNORMAL SCREENING MAMMOGRAM: ICD-10-CM

## 2021-06-24 PROCEDURE — 77061 BREAST TOMOSYNTHESIS UNI: CPT

## 2021-06-24 RX ORDER — METRONIDAZOLE 500 MG/1
500 TABLET ORAL 2 TIMES DAILY
Qty: 14 TABLET | Refills: 0 | Status: SHIPPED | OUTPATIENT
Start: 2021-06-24 | End: 2021-07-01

## 2021-06-24 NOTE — TELEPHONE ENCOUNTER
[address] patient (doctor out of office) sending to [facility] as work in. Patient said she just finished Provera and now has recurrent BV. She said she has a clear vaginal discharge with a fishy odor. No itching or burning. Wants Flagyl to take orally.          [facility]

## 2021-08-18 ENCOUNTER — TELEPHONE (OUTPATIENT)
Dept: OBGYN CLINIC | Age: 41
End: 2021-08-18

## 2021-08-18 RX ORDER — MEDROXYPROGESTERONE ACETATE 150 MG/ML
150 INJECTION, SUSPENSION INTRAMUSCULAR
Qty: 1 SYRINGE | Refills: 3 | Status: SHIPPED | OUTPATIENT
Start: 2021-08-18 | End: 2022-05-26 | Stop reason: SDUPTHER

## 2021-08-18 RX ORDER — MEDROXYPROGESTERONE ACETATE 150 MG/ML
150 INJECTION, SUSPENSION INTRAMUSCULAR ONCE
Qty: 1 SYRINGE | Refills: 3
Start: 2021-08-18 | End: 2021-08-18

## 2021-08-18 NOTE — TELEPHONE ENCOUNTER
Patient was advised of MD sent prescription and will send a my chart message when she gets the injection and then we can send her her next dates for the depo injection    Patient verbalized understanding.

## 2021-08-18 NOTE — TELEPHONE ENCOUNTER
Call received at 2:10PMd    39year old last seen in the office on 6/7/2021 for annual exam    Patient calling to ask for a prescription for depo provera injection    Patient wants to restart the depo and is currently on her cycle and state she mother is a nurse and can give her the shot  Patient reports she has not been having sex and is not pregnant    [facility] [address] [facility]      Please advise    Thank you

## 2021-09-21 ENCOUNTER — TELEPHONE (OUTPATIENT)
Dept: OBGYN CLINIC | Age: 41
End: 2021-09-21

## 2021-09-21 NOTE — TELEPHONE ENCOUNTER
Message left  at 1515pM    39year old patient last seen in the office on 6/7/2021    Patient left a message about when her last std testing was done    This nurse called the patient back and advised that the last testing was 2017.     Patient was placed on the schedule to be seen for std testing on 9:40am on 9/27/2021      Patient verbalized understanding

## 2021-09-27 ENCOUNTER — TELEPHONE (OUTPATIENT)
Dept: SURGERY | Age: 41
End: 2021-09-27

## 2021-09-27 ENCOUNTER — OFFICE VISIT (OUTPATIENT)
Dept: OBGYN CLINIC | Age: 41
End: 2021-09-27
Payer: COMMERCIAL

## 2021-09-27 VITALS — BODY MASS INDEX: 24.69 KG/M2 | SYSTOLIC BLOOD PRESSURE: 98 MMHG | DIASTOLIC BLOOD PRESSURE: 52 MMHG | WEIGHT: 153 LBS

## 2021-09-27 DIAGNOSIS — Z11.3 SCREENING EXAMINATION FOR STD (SEXUALLY TRANSMITTED DISEASE): Primary | ICD-10-CM

## 2021-09-27 PROCEDURE — 99213 OFFICE O/P EST LOW 20 MIN: CPT | Performed by: OBSTETRICS & GYNECOLOGY

## 2021-09-27 NOTE — PATIENT INSTRUCTIONS
Safer Sex: Care Instructions  Your Care Instructions  Safer sex is a way to reduce your risk of getting an infection spread through sex. It can also help prevent pregnancy. Most infections that are spread through sex, also called sexually transmitted infections or STIs, can be cured. But some can decrease your chances of getting pregnant if they are not treated early. Others, such as herpes, have no cure. And some, such as HIV, can be deadly. Several products can help you practice safer sex and reduce your chance of STIs. One of the best is a condom. There are condoms for men and for women. The female condom is a tube of soft plastic with a closed end that is placed deep into the vagina. You can use a special rubber sheet (dental dam) for protection during oral sex. Disposable gloves can keep your hands from touching blood, semen, or other body fluids that can carry infections. Remember that birth control methods such as diaphragms, IUDs, foams, and birth control pills do not stop you from getting STIs. Follow-up care is a key part of your treatment and safety. Be sure to make and go to all appointments, and call your doctor if you are having problems. It's also a good idea to know your test results and keep a list of the medicines you take. How can you care for yourself at home? · Think about getting shots to prevent hepatitis A and hepatitis B. These two diseases can be spread through sex. You also can get hepatitis A if you eat infected food. · Use condoms or female condoms each time and every time you have sex. · Learn the right way to use a male condom:  ? Condoms come in several sizes. Make sure you use the right size. A condom that is too small can break easily. A condom that is too big can slip off during sex. Use a new condom each time you have sex. ? Be careful not to poke a hole in the condom when you open the wrapper. ? Squeeze the tip of the condom to keep out air.   ? Pull down the loose skin (foreskin) from the head of an uncircumcised penis. ? While squeezing the tip of the condom, unroll it all the way down to the base of the firm penis. ? Never use petroleum jelly (such as Vaseline), grease, hand lotion, baby oil, or anything with oil in it. These products can make holes in the condom. ? After sex, hold the condom on your penis as you remove your penis from your partner. This will keep semen from spilling out of the condom. · Learn to use a female condom:  ? You can put in a female condom up to 8 hours before sex. ? Squeeze the smaller ring at the closed end and insert it deep into the vagina. The larger ring at the open end should stay outside the vagina. ? During sex, make sure the penis goes into the condom. ? After the penis is removed, close the open end of the condom by twisting it. Remove the condom. · Do not use a female condom and male condom at the same time. · Do not have sex with anyone who has symptoms of an STI, such as sores on the genitals or mouth. The herpes virus that causes cold sores can spread to and from the penis and vagina. · Do not drink a lot of alcohol or use drugs before sex. This can cause you to let down your guard and not practice safer sex. · Having one sex partner (who does not have STIs and does not have sex with anyone else) is a sure way to avoid STIs. · Talk to your partner before you have sex. Find out if he or she has or is at risk for any STI. Keep in mind that a person may be able to spread an STI even if he or she does not have symptoms. You and your partner may want to get an HIV test. You should get tested again 6 months later. Where can you learn more? Go to http://www.gray.com/  Enter B608 in the search box to learn more about \"Safer Sex: Care Instructions. \"  Current as of: February 11, 2021               Content Version: 13.0  © 3725-4807 [employer], [employer].    Care instructions adapted under license by [employer] (which disclaims liability or warranty for this information). If you have questions about a medical condition or this instruction, always ask your healthcare professional. [address] any warranty or liability for your use of this information.

## 2021-09-27 NOTE — PROGRESS NOTES
Chief Complaint   Sexually Transmitted Disease      HPI  Enedelia Marin is a 39 y.o. female who presents for the evaluation of STD testing. Patient's last menstrual period was 2021 (exact date). The patient requests to have STD testing just to have a piece of mind. She had [facility] testing a few months ago. Pt states she's currently not sexually active. Pt also c/o of having no appetite. Wants to be prescribed something that will stimulate her appetite and help gain weight. Past Medical History:   Diagnosis Date    Anemia     GERD (gastroesophageal reflux disease)     Hepatitis C 2010    treated    Hx of abnormal Pap smear     Ill-defined condition     anemia hx, has received several blood transfusion, last blood transfusion May 5 2014    Intestinal perforation [facility])      Past Surgical History:   Procedure Laterality Date    HX GASTRIC BYPASS      HX GI  2009    perforated intestines    HX GYN       x 4     Social History     Occupational History    Not on file   Tobacco Use    Smoking status: Former Smoker    Smokeless tobacco: Never Used   Vaping Use    Vaping Use: Never used   Substance and Sexual Activity    Alcohol use: No    Drug use: No    Sexual activity: Yes     Partners: Male     Birth control/protection: None     Family History   Problem Relation Age of Onset    Diabetes Maternal Grandmother        Allergies   Allergen Reactions    Codeine Other (comments)     Prior to Admission medications    Medication Sig Start Date End Date Taking? Authorizing Provider   medroxyPROGESTERone (DEPO-PROVERA) 150 mg/mL syrg 1 mL by IntraMUSCular route every three (3) months. 21  Yes Ariel Hollingsworth MD   acyclovir (ZOVIRAX) 400 mg tablet Take on tablet by mouth 3x day for 5 days 10/8/18  Yes Ariel Hollingsworth MD   valACYclovir (VALTREX) 500 mg tablet Take 1 Tablet by mouth daily.  21   Ariel Hollingsworth MD   zolpidem (AMBIEN) 5 mg tablet Take 1 Tab by mouth nightly as needed for Sleep. Max Daily Amount: 5 mg. Patient not taking: Reported on 9/27/2021 4/26/21   Katie Mueller MD   ergocalciferol (ERGOCALCIFEROL) 1,250 mcg (50,000 unit) capsule Take 1 Cap by mouth every seven (7) days. Patient not taking: Reported on 9/27/2021 4/14/21   Katie Mueller MD   pantoprazole (PROTONIX) 40 mg tablet Take 1 Tab by mouth two (2) times a day. Patient not taking: Reported on 9/27/2021 4/8/21   Katie Mueller MD   ethinyl estradiol-etonogestrel (NUVARING) 0.12-0.015 mg/24 hr vaginal ring Insert one ring per vagina, leave in place for 3 weeks and remove for 1 week  Patient not taking: Reported on 6/3/2021 4/8/19   Lotus Reddy MD   norgestimate-ethinyl estradiol ([address] [place] [address],) 0.25-35 mg-mcg tab Take 1 Tab by mouth daily.   Patient not taking: Reported on 6/3/2021 3/1/18   Lotus Reddy MD   acyclovir (ZOVIRAX) 5 % ointment Apply to affected area 5 times a day  Patient not taking: Reported on 9/27/2021 2/21/18   Lotus Reddy MD        Review of Systems: History obtained from the patient  Constitutional: negative for weight loss, fever, night sweats  HEENT: negative for hearing loss, earache, congestion, snoring, sorethroat  CV: negative for chest pain, palpitations, edema  Resp: negative for cough, shortness of breath, wheezing  Breast: negative for breast lumps, nipple discharge, galactorrhea  GI: negative for change in bowel habits, abdominal pain, black or bloody stools  : negative for frequency, dysuria, hematuria, vaginal discharge  MSK: negative for back pain, joint pain, muscle pain  Skin: negative for itching, rash, hives  Neuro: negative for dizziness, headache, confusion, weakness  Psych: negative for anxiety, depression, change in mood  Heme/lymph: negative for bleeding, bruising, pallor    Objective:  Visit Vitals  BP (!) 98/52   Wt 153 lb (69.4 kg)   LMP 08/18/2021 (Exact Date)   BMI 24.69 kg/m²       Physical Exam:   PHYSICAL EXAMINATION    Constitutional  · Appearance: well-nourished, well developed, alert, in no acute distress        Neurologic/Psychiatric  · Mental Status:  · Orientation: grossly oriented to person, place and time  · Mood and Affect: mood normal, affect appropriate    Assessment:   STD screening    Plan:   She had serum STD testing sent.   Advised to see PCP about weight loss

## 2021-09-27 NOTE — TELEPHONE ENCOUNTER
Patient called wanting a prescription for Zyprexa. Stated she discussed getting on it with  back in April. Please advise.

## 2021-09-28 LAB
HBV SURFACE AG SER QL: <0.1 INDEX
HBV SURFACE AG SER QL: NEGATIVE
HIV 1+2 AB+HIV1 P24 AG SERPL QL IA: NONREACTIVE
HIV12 RESULT COMMENT, HHIVC: NORMAL
RPR SER QL: NONREACTIVE

## 2021-09-29 LAB
HCV AB S/CO SERPL IA: <0.1 S/CO RATIO (ref 0–0.9)
HCV AB SERPL QL IA: NORMAL
HSV1 IGG SER IA-ACNC: <0.91 INDEX (ref 0–0.9)
HSV2 IGG SER IA-ACNC: 13.9 INDEX (ref 0–0.9)

## 2021-09-29 NOTE — TELEPHONE ENCOUNTER
Spoke with patient who wanted a prescription for Zyprexa. Dr. Jenny Thayer does not prescribe this medication She needs to to follow up with PCP. Patient states,she is at PCP office now.

## 2021-10-21 ENCOUNTER — HOSPITAL ENCOUNTER (EMERGENCY)
Age: 41
Discharge: HOME | End: 2021-10-21
Attending: EMERGENCY MEDICINE
Payer: COMMERCIAL

## 2021-10-21 VITALS
TEMPERATURE: 98.4 F | WEIGHT: 150 LBS | BODY MASS INDEX: 24.99 KG/M2 | RESPIRATION RATE: 16 BRPM | HEART RATE: 78 BPM | DIASTOLIC BLOOD PRESSURE: 54 MMHG | SYSTOLIC BLOOD PRESSURE: 103 MMHG | HEIGHT: 65 IN | OXYGEN SATURATION: 100 %

## 2021-10-21 DIAGNOSIS — R10.13 ABDOMINAL PAIN, EPIGASTRIC: Primary | ICD-10-CM

## 2021-10-21 DIAGNOSIS — K29.00 ACUTE GASTRITIS WITHOUT HEMORRHAGE, UNSPECIFIED GASTRITIS TYPE: ICD-10-CM

## 2021-10-21 LAB
ALBUMIN SERPL-MCNC: 3.7 G/DL (ref 3.5–5)
ALBUMIN/GLOB SERPL: 1 {RATIO} (ref 1.1–2.2)
ALP SERPL-CCNC: 62 U/L (ref 45–117)
ALT SERPL-CCNC: 33 U/L (ref 12–78)
ANION GAP SERPL CALC-SCNC: 10 MMOL/L (ref 5–15)
APTT PPP: 23 SEC (ref 22.1–31)
AST SERPL W P-5'-P-CCNC: 19 U/L (ref 15–37)
BASOPHILS # BLD: 0 K/UL (ref 0–0.1)
BASOPHILS NFR BLD: 1 % (ref 0–1)
BILIRUB SERPL-MCNC: 0.7 MG/DL (ref 0.2–1)
BUN SERPL-MCNC: 11 MG/DL (ref 6–20)
BUN/CREAT SERPL: 14 (ref 12–20)
CA-I BLD-MCNC: 9.6 MG/DL (ref 8.5–10.1)
CHLORIDE SERPL-SCNC: 106 MMOL/L (ref 97–108)
CO2 SERPL-SCNC: 26 MMOL/L (ref 21–32)
CREAT SERPL-MCNC: 0.79 MG/DL (ref 0.55–1.02)
DIFFERENTIAL METHOD BLD: ABNORMAL
EOSINOPHIL # BLD: 0.1 K/UL (ref 0–0.4)
EOSINOPHIL NFR BLD: 2 % (ref 0–7)
ERYTHROCYTE [DISTWIDTH] IN BLOOD BY AUTOMATED COUNT: 17.6 % (ref 11.5–14.5)
GLOBULIN SER CALC-MCNC: 3.8 G/DL (ref 2–4)
GLUCOSE SERPL-MCNC: 80 MG/DL (ref 65–100)
HCT VFR BLD AUTO: 34.6 % (ref 35–47)
HGB BLD-MCNC: 11.2 G/DL (ref 11.5–16)
IMM GRANULOCYTES # BLD AUTO: 0 K/UL (ref 0–0.04)
IMM GRANULOCYTES NFR BLD AUTO: 0 % (ref 0–0.5)
INR PPP: 1.1 (ref 0.9–1.1)
LIPASE SERPL-CCNC: 131 U/L (ref 73–393)
LYMPHOCYTES # BLD: 1.7 K/UL (ref 0.8–3.5)
LYMPHOCYTES NFR BLD: 37 % (ref 12–49)
MCH RBC QN AUTO: 25.7 PG (ref 26–34)
MCHC RBC AUTO-ENTMCNC: 32.4 G/DL (ref 30–36.5)
MCV RBC AUTO: 79.5 FL (ref 80–99)
MONOCYTES # BLD: 0.4 K/UL (ref 0–1)
MONOCYTES NFR BLD: 9 % (ref 5–13)
NEUTS SEG # BLD: 2.3 K/UL (ref 1.8–8)
NEUTS SEG NFR BLD: 51 % (ref 32–75)
PLATELET # BLD AUTO: 152 K/UL (ref 150–400)
POTASSIUM SERPL-SCNC: 3.5 MMOL/L (ref 3.5–5.1)
PROT SERPL-MCNC: 7.5 G/DL (ref 6.4–8.2)
PROTHROMBIN TIME: 10.6 SEC (ref 9–11.1)
RBC # BLD AUTO: 4.35 M/UL (ref 3.8–5.2)
SODIUM SERPL-SCNC: 142 MMOL/L (ref 136–145)
THERAPEUTIC RANGE,PTTT: NORMAL SEC (ref 82–109)
WBC # BLD AUTO: 4.6 K/UL (ref 3.6–11)

## 2021-10-21 PROCEDURE — 99284 EMERGENCY DEPT VISIT MOD MDM: CPT

## 2021-10-21 PROCEDURE — 85025 COMPLETE CBC W/AUTO DIFF WBC: CPT

## 2021-10-21 PROCEDURE — 85730 THROMBOPLASTIN TIME PARTIAL: CPT

## 2021-10-21 PROCEDURE — 85610 PROTHROMBIN TIME: CPT

## 2021-10-21 PROCEDURE — 74011000250 HC RX REV CODE- 250: Performed by: EMERGENCY MEDICINE

## 2021-10-21 PROCEDURE — 74011250637 HC RX REV CODE- 250/637: Performed by: EMERGENCY MEDICINE

## 2021-10-21 PROCEDURE — 80053 COMPREHEN METABOLIC PANEL: CPT

## 2021-10-21 PROCEDURE — 36415 COLL VENOUS BLD VENIPUNCTURE: CPT

## 2021-10-21 PROCEDURE — 96374 THER/PROPH/DIAG INJ IV PUSH: CPT

## 2021-10-21 PROCEDURE — 74011250636 HC RX REV CODE- 250/636: Performed by: EMERGENCY MEDICINE

## 2021-10-21 PROCEDURE — 96375 TX/PRO/DX INJ NEW DRUG ADDON: CPT

## 2021-10-21 PROCEDURE — 86901 BLOOD TYPING SEROLOGIC RH(D): CPT

## 2021-10-21 PROCEDURE — 86870 RBC ANTIBODY IDENTIFICATION: CPT

## 2021-10-21 PROCEDURE — 83690 ASSAY OF LIPASE: CPT

## 2021-10-21 RX ORDER — ONDANSETRON 4 MG/1
4 TABLET, ORALLY DISINTEGRATING ORAL
Qty: 12 TABLET | Refills: 0 | Status: SHIPPED | OUTPATIENT
Start: 2021-10-21 | End: 2021-10-21 | Stop reason: SDUPTHER

## 2021-10-21 RX ORDER — SERTRALINE HYDROCHLORIDE 50 MG/1
TABLET, FILM COATED ORAL
COMMUNITY
Start: 2021-10-19 | End: 2022-02-21

## 2021-10-21 RX ORDER — PANTOPRAZOLE SODIUM 40 MG/1
40 TABLET, DELAYED RELEASE ORAL DAILY
Qty: 20 TABLET | Refills: 0 | Status: SHIPPED | OUTPATIENT
Start: 2021-10-21 | End: 2021-11-10

## 2021-10-21 RX ORDER — ONDANSETRON 4 MG/1
4 TABLET, ORALLY DISINTEGRATING ORAL
Qty: 12 TABLET | Refills: 0 | Status: SHIPPED | OUTPATIENT
Start: 2021-10-21 | End: 2022-01-12 | Stop reason: SDUPTHER

## 2021-10-21 RX ORDER — PANTOPRAZOLE SODIUM 40 MG/1
40 TABLET, DELAYED RELEASE ORAL DAILY
Qty: 20 TABLET | Refills: 0 | Status: SHIPPED | OUTPATIENT
Start: 2021-10-21 | End: 2021-10-21 | Stop reason: SDUPTHER

## 2021-10-21 RX ORDER — SUCRALFATE 1 G/10ML
1 SUSPENSION ORAL 4 TIMES DAILY
Qty: 200 ML | Refills: 0 | Status: SHIPPED | OUTPATIENT
Start: 2021-10-21 | End: 2021-10-31

## 2021-10-21 RX ORDER — ONDANSETRON 2 MG/ML
4 INJECTION INTRAMUSCULAR; INTRAVENOUS
Status: COMPLETED | OUTPATIENT
Start: 2021-10-21 | End: 2021-10-21

## 2021-10-21 RX ADMIN — FAMOTIDINE 20 MG: 10 INJECTION, SOLUTION INTRAVENOUS at 20:35

## 2021-10-21 RX ADMIN — ONDANSETRON 4 MG: 2 INJECTION INTRAMUSCULAR; INTRAVENOUS at 20:35

## 2021-10-21 RX ADMIN — LIDOCAINE HYDROCHLORIDE 15 ML: 20 SOLUTION ORAL; TOPICAL at 20:42

## 2021-10-21 NOTE — ED TRIAGE NOTES
Pt has hx gastric bypass. C/o 2 days of coffee ground emesis and mid abd pain. States also has hx of ulcer. Saw GI and completed medications for the ulcer in July.

## 2021-10-21 NOTE — LETTER
[facility] EMERGENCY DEPARTMENT  [address] [facility] [address]  [phone]    Work/School Note    Date: 10/21/2021    To Whom It May concern:    Sheryle Merlin was seen and treated today in the emergency room by the following provider(s):  Attending Provider: jA Saini MD.      Sheryle Merlin may return to work on 10/25/21.     Sincerely,          [facility]

## 2021-10-22 LAB
ABO + RH BLD: NORMAL
BLOOD GROUP ANTIBODIES SERPL: NORMAL
BLOOD GROUP ANTIBODIES SERPL: POSITIVE
SPECIMEN EXP DATE BLD: NORMAL
XXAUTO CONTROL: NEGATIVE

## 2021-10-22 NOTE — ED PROVIDER NOTES
EMERGENCY DEPARTMENT HISTORY AND PHYSICAL EXAM      Date: 10/21/2021  Patient Name: Randell Ricardo    History of Presenting Illness     Chief Complaint   Patient presents with    Abdominal Pain    Vomiting       History Provided By: Patient    HPI: Randell Ricardo, 39 y.o. female   presents to the ED with cc of abdominal pain and vomiting. Patient complains of intermittent episode of epigastric abdominal pain and vomiting for last 2 days. Patient is status post gastric bypass many years ago and was recently diagnosed with \"ulcer\" by EGD several weeks ago. Patient states that she completed course of Protonix few days ago and pain returned. Patient states that the vomiting has some dark material without signs of melena or bright red blood per rectum. Patient denies use of EtOH. PCP: None    No current facility-administered medications on file prior to encounter. Current Outpatient Medications on File Prior to Encounter   Medication Sig Dispense Refill    sertraline (ZOLOFT) 50 mg tablet       medroxyPROGESTERone (DEPO-PROVERA) 150 mg/mL syrg 1 mL by IntraMUSCular route every three (3) months. 1 Syringe 3    valACYclovir (VALTREX) 500 mg tablet Take 1 Tablet by mouth daily. 90 Tablet 3    [DISCONTINUED] zolpidem (AMBIEN) 5 mg tablet Take 1 Tab by mouth nightly as needed for Sleep. Max Daily Amount: 5 mg. (Patient not taking: Reported on 9/27/2021) 20 Tab 1    [DISCONTINUED] ergocalciferol (ERGOCALCIFEROL) 1,250 mcg (50,000 unit) capsule Take 1 Cap by mouth every seven (7) days. (Patient not taking: Reported on 9/27/2021) 30 Cap 5    [DISCONTINUED] pantoprazole (PROTONIX) 40 mg tablet Take 1 Tab by mouth two (2) times a day.  (Patient not taking: Reported on 9/27/2021) 60 Tab 4    [DISCONTINUED] ethinyl estradiol-etonogestrel (NUVARING) 0.12-0.015 mg/24 hr vaginal ring Insert one ring per vagina, leave in place for 3 weeks and remove for 1 week (Patient not taking: Reported on 6/3/2021) 1 Device 0    acyclovir (ZOVIRAX) 400 mg tablet Take on tablet by mouth 3x day for 5 days 15 Tab 3    [DISCONTINUED] norgestimate-ethinyl estradiol ([facility], ,) 0.25-35 mg-mcg tab Take 1 Tab by mouth daily. (Patient not taking: Reported on 6/3/2021) 1 Dose Pack 3    [DISCONTINUED] acyclovir (ZOVIRAX) 5 % ointment Apply to affected area 5 times a day (Patient not taking: Reported on 2021) 2 g 0       Past History     Past Medical History:  Past Medical History:   Diagnosis Date    Anemia     GERD (gastroesophageal reflux disease)     Hepatitis C 2010    treated    Hx of abnormal Pap smear     Ill-defined condition     anemia hx, has received several blood transfusion, last blood transfusion May 5 2014    Intestinal perforation [facility])        Past Surgical History:  Past Surgical History:   Procedure Laterality Date    HX GASTRIC BYPASS  2005    HX GI  2009    perforated intestines    HX GYN       x 4       Family History:  Family History   Problem Relation Age of Onset    Diabetes Maternal Grandmother        Social History:  Social History     Tobacco Use    Smoking status: Former Smoker    Smokeless tobacco: Never Used   Vaping Use    Vaping Use: Never used   Substance Use Topics    Alcohol use: No    Drug use: No       Allergies: Allergies   Allergen Reactions    Codeine Other (comments)         Review of Systems   Review of Systems   Constitutional: Negative for activity change, appetite change, chills and fever. HENT: Negative for sore throat. Eyes: Negative for discharge. Respiratory: Negative for shortness of breath. Cardiovascular: Negative for chest pain. Gastrointestinal: Positive for abdominal pain, nausea and vomiting. Endocrine: Negative for polyuria. Genitourinary: Negative for difficulty urinating. Musculoskeletal: Negative for arthralgias. Skin: Negative for rash. Neurological: Negative for headaches. Hematological: Negative for adenopathy. Psychiatric/Behavioral: Negative for suicidal ideas. All other systems reviewed and are negative. Physical Exam   Physical Exam  Vitals and nursing note reviewed. Constitutional:       Appearance: Normal appearance. HENT:      Head: Normocephalic and atraumatic. Nose: Nose normal.      Mouth/Throat:      Mouth: Mucous membranes are moist.      Pharynx: Oropharynx is clear. Eyes:      Conjunctiva/sclera: Conjunctivae normal.   Cardiovascular:      Rate and Rhythm: Normal rate and regular rhythm. Heart sounds: Normal heart sounds. Pulmonary:      Effort: Pulmonary effort is normal.      Breath sounds: Normal breath sounds. Abdominal:      General: Abdomen is flat. Bowel sounds are normal.      Palpations: Abdomen is soft. Comments: Epigastric tenderness. No Mi sign. No right upper quadrant tenderness. Musculoskeletal:      Cervical back: Neck supple. Right lower leg: No edema. Left lower leg: No edema. Skin:     General: Skin is warm and dry. Neurological:      General: No focal deficit present. Mental Status: She is alert and oriented to person, place, and time. Psychiatric:         Mood and Affect: Mood normal.         Diagnostic Study Results     Labs -     Recent Results (from the past 12 hour(s))   CBC WITH AUTOMATED DIFF    Collection Time: 10/21/21  6:30 PM   Result Value Ref Range    WBC 4.6 3.6 - 11.0 K/uL    RBC 4.35 3.80 - 5.20 M/uL    HGB 11.2 (L) 11.5 - 16.0 g/dL    HCT 34.6 (L) 35.0 - 47.0 %    MCV 79.5 (L) 80.0 - 99.0 FL    MCH 25.7 (L) 26.0 - 34.0 PG    MCHC 32.4 30.0 - 36.5 g/dL    RDW 17.6 (H) 11.5 - 14.5 %    PLATELET 325 045 - 729 K/uL    NEUTROPHILS 51 32 - 75 %    LYMPHOCYTES 37 12 - 49 %    MONOCYTES 9 5 - 13 %    EOSINOPHILS 2 0 - 7 %    BASOPHILS 1 0 - 1 %    IMMATURE GRANULOCYTES 0 0.0 - 0.5 %    ABS. NEUTROPHILS 2.3 1.8 - 8.0 K/UL    ABS. LYMPHOCYTES 1.7 0.8 - 3.5 K/UL    ABS. MONOCYTES 0.4 0.0 - 1.0 K/UL    ABS.  EOSINOPHILS 0.1 0.0 - 0.4 K/UL    ABS. BASOPHILS 0.0 0.0 - 0.1 K/UL    ABS. IMM. GRANS. 0.0 0.00 - 0.04 K/UL    DF AUTOMATED     METABOLIC PANEL, COMPREHENSIVE    Collection Time: 10/21/21  6:30 PM   Result Value Ref Range    Sodium 142 136 - 145 mmol/L    Potassium 3.5 3.5 - 5.1 mmol/L    Chloride 106 97 - 108 mmol/L    CO2 26 21 - 32 mmol/L    Anion gap 10 5 - 15 mmol/L    Glucose 80 65 - 100 mg/dL    BUN 11 6 - 20 mg/dL    Creatinine 0.79 0.55 - 1.02 mg/dL    BUN/Creatinine ratio 14 12 - 20      GFR est AA >60 >60 ml/min/1.73m2    GFR est non-AA >60 >60 ml/min/1.73m2    Calcium 9.6 8.5 - 10.1 mg/dL    Bilirubin, total 0.7 0.2 - 1.0 mg/dL    AST (SGOT) 19 15 - 37 U/L    ALT (SGPT) 33 12 - 78 U/L    Alk. phosphatase 62 45 - 117 U/L    Protein, total 7.5 6.4 - 8.2 g/dL    Albumin 3.7 3.5 - 5.0 g/dL    Globulin 3.8 2.0 - 4.0 g/dL    A-G Ratio 1.0 (L) 1.1 - 2.2     LIPASE    Collection Time: 10/21/21  6:30 PM   Result Value Ref Range    Lipase 131 73 - 393 U/L   PROTHROMBIN TIME + INR    Collection Time: 10/21/21  7:30 PM   Result Value Ref Range    Prothrombin time 10.6 9.0 - 11.1 sec    INR 1.1 0.9 - 1.1     PTT    Collection Time: 10/21/21  7:30 PM   Result Value Ref Range    aPTT 23.0 22.1 - 31.0 sec    aPTT, therapeutic range   82 - 109 sec       Radiologic Studies -   No orders to display     CT Results  (Last 48 hours)    None        CXR Results  (Last 48 hours)    None            Medical Decision Making   I am the first provider for this patient. I reviewed the vital signs, available nursing notes, past medical history, past surgical history, family history and social history. Vital Signs-Reviewed the patient's vital signs. Patient Vitals for the past 12 hrs:   Temp Pulse Resp BP SpO2   10/21/21 2002 -- -- -- -- 98 %   10/21/21 1811 98.2 °F (36.8 °C) 75 16 105/81 100 %       Records Reviewed:     Provider Notes (Medical Decision Making):   No signs of active GI bleeding with normal hemoglobin.   Patient will be discharged with a repeat cycle of Protonix and follow-up with GI.    ED Course:   Initial assessment performed. The patients presenting problems have been discussed, and they are in agreement with the care plan formulated and outlined with them. I have encouraged them to ask questions as they arise throughout their visit. No vomiting. Pain has improved. PROCEDURES      Disposition: Condition stable   DC- Adult Discharges: All of the diagnostic tests were reviewed and questions answered. Diagnosis, care plan and treatment options were discussed. understand instructions and will follow up as directed. The patients results have been reviewed with them. They have been counseled regarding their diagnosis. The patient verbally convey understanding and agreement of the signs, symptoms, diagnosis, treatment and prognosis and additionally agrees to follow up as recommended. They also agree with the care-plan and convey that all of their questions have been answered. I have also put together some discharge instructions for them that include: 1) educational information regarding their diagnosis, 2) how to care for their diagnosis at home, as well a 3) list of reasons why they would want to return to the ED prior to their follow-up appointment, should their condition change. PLAN:  1. Current Discharge Medication List      START taking these medications    Details   ondansetron (Zofran ODT) 4 mg disintegrating tablet Take 1 Tablet by mouth every eight (8) hours as needed for Nausea, Vomiting or Nausea or Vomiting. Qty: 12 Tablet, Refills: 0  Start date: 10/21/2021      pantoprazole (Protonix) 40 mg tablet Take 1 Tablet by mouth daily for 20 days. Qty: 20 Tablet, Refills: 0  Start date: 10/21/2021, End date: 11/10/2021           2.    Follow-up Information     Follow up With Specialties Details Why Contact Info    Follow up with your primary care physician  Schedule an appointment as soon as possible for a visit in 3 days As needed         Return to ED if worse     Diagnosis     Clinical Impression:   1. Abdominal pain, epigastric    2. Acute gastritis without hemorrhage, unspecified gastritis type        Please note that this dictation was completed with [employer], the computer voice recognition software. Quite often unanticipated grammatical, syntax, homophones, and other interpretive errors are inadvertently transcribed by the computer software. Please disregard these errors. Please excuse any errors that have escaped final proofreading. Thank you.

## 2021-10-25 ENCOUNTER — OFFICE VISIT (OUTPATIENT)
Dept: SURGERY | Age: 41
End: 2021-10-25
Payer: COMMERCIAL

## 2021-10-25 ENCOUNTER — TELEPHONE (OUTPATIENT)
Dept: SURGERY | Age: 41
End: 2021-10-25

## 2021-10-25 VITALS
DIASTOLIC BLOOD PRESSURE: 64 MMHG | SYSTOLIC BLOOD PRESSURE: 121 MMHG | HEART RATE: 140 BPM | WEIGHT: 149 LBS | TEMPERATURE: 99.2 F | OXYGEN SATURATION: 98 % | HEIGHT: 65 IN | BODY MASS INDEX: 24.83 KG/M2

## 2021-10-25 DIAGNOSIS — K28.9 MARGINAL ULCER: Primary | ICD-10-CM

## 2021-10-25 PROCEDURE — 99214 OFFICE O/P EST MOD 30 MIN: CPT | Performed by: SURGERY

## 2021-10-25 RX ORDER — OMEPRAZOLE 40 MG/1
40 CAPSULE, DELAYED RELEASE ORAL 2 TIMES DAILY
Qty: 60 CAPSULE | Refills: 3 | Status: SHIPPED | OUTPATIENT
Start: 2021-10-25 | End: 2022-02-21

## 2021-10-25 RX ORDER — SUCRALFATE 1 G/1
1 TABLET ORAL
Qty: 120 TABLET | Refills: 3 | Status: SHIPPED | OUTPATIENT
Start: 2021-10-25 | End: 2021-11-24

## 2021-10-25 NOTE — LETTER
NOTIFICATION RETURN TO WORK / SCHOOL    10/25/2021 4:11 PM    Ms. Medhat Escobedo  [place] [address] [facility] [address]      To Whom It May Concern:    Medhat Escobedo is currently under the care of George Tatum. She will return to work/school on: Thursday 10/28/21 with no restrictions    If there are questions or concerns please have the patient contact our office.         Sincerely,      Julius Nation MD

## 2021-10-25 NOTE — PROGRESS NOTES
Surgery Progress Note    10/25/2021    CC: Abdominal pain    Subjective:     Patient lost to follow-up over the last few months. She reports vomiting up blood recently and went to the ER. She had run out of her PPI at home. Her diet has been poor eating mainly potato chips. Not taking protein shakes. 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 She reports that her pain is still a constant dull pain that is a 7 out of 10. Eating worsens the pain. Carafate helps. Pain medication also helps. It sounds like this pain has never resolved after the last visit back in April. She never saw GI for repeat endoscopy. She also did not see our NP's for follow-up. She has lost 2 teeth in the last few months secondary to probably her nutrition.     Constitutional: No fever or chills  Neurologic: No headache  Eyes: No scleral icterus or irritated eyes  Nose: No nasal pain or drainage  Mouth: No oral lesions or sore throat  Cardiac: No palpations or chest pain  Pulmonary: No cough or shortness of breath  Gastrointestinal: Abdominal pain, no nausea, emesis, diarrhea, or constipation  Genitourinary: No dysuria  Musculoskeletal: No muscle or joint tenderness  Skin: No rashes or lesions  Psychiatric: No anxiety or depressed mood    Objective:     Visit Vitals  /64 (BP 1 Location: Left upper arm, BP Patient Position: Sitting, BP Cuff Size: Adult)   Pulse (!) 140   Temp 99.2 °F (37.3 °C) (Oral)   Ht 5' 5\" (1.651 m)   Wt 149 lb (67.6 kg)   SpO2 98%   BMI 24.79 kg/m²       General: No acute distress, conversant  Eyes: PERRLA, no scleral icterus  HENT: Normocephalic without oral lesions  Neck: Trachea midline without LAD  Cardiac: Normal pulse rate and rhythm  Pulmonary: Symmetric chest rise with normal effort  GI: Soft, mild tenderness in epigastrium, ND, no hernia, no splenomegaly  Skin: Warm without rash  Extremities: No edema or joint stiffness  Psych: Appropriate mood and affect    Assessment: 44-year-old female with underlying anxiety and persistent marginal ulcer problems    Plan:     Seeing a psychiatrist and now on medication for anxiety and mood    Unfortunate that she was lost to follow-up. Resume PPI twice a day, Carafate 4 times a day, urgent consult to thank in for repeat EGD. I will see her back after the EGD is complete. Likely looking at surgical revision of GJ likely robotic. Along with a vagotomy and possible feeding tube into the remnant stomach. Unknown source for her ulcer may be technical/ischemic versus hypersecretory or stress response. Denies NSAIDs or smoking. We will check labs after her next visit and prepare her for revisional surgery in the next few months. She may need TPN before surgery.     Army Catalina MD  Bariatric and General Surgeon  [facility]

## 2021-10-25 NOTE — TELEPHONE ENCOUNTER
Patient came to the front following their appointment today (10/25) to explain that they need a return to work letter for them to return to work Thursday, October 28th.  Patient requested the letter to be uploaded to their [facility] [address]

## 2021-10-25 NOTE — PROGRESS NOTES
1. Have you been to the ER, urgent care clinic since your last visit? Hospitalized since your last visit? Yes, seen in ER 10/21/21 vomiting blood, ulcer. 2. Have you seen or consulted any other health care providers outside of the [address] [place] [address] since your last visit? Include any pap smears or colon screening.  No

## 2021-10-28 ENCOUNTER — TELEPHONE (OUTPATIENT)
Dept: SURGERY | Age: 41
End: 2021-10-28

## 2021-10-28 NOTE — TELEPHONE ENCOUNTER
Called and spoke with Gail Ivey. Unfortunately they don't accept her secondary insurance. Gail Ivey states that Dr. Jamal Gross with [address] [facility] is the only provider in [place] and surrounding area that accepts her secondary insurance. I will forward to  and follow back up with Gail Ivey. She is happy to forward the referral to their office.

## 2021-10-28 NOTE — TELEPHONE ENCOUNTER
aLtrell Bennett from [facility] would like a call back from the nurse regarding patient's referral.    She stated that they do not accept the patient's insurance and that they could send her to another gastro specialist or we could send the referral elsewhere.

## 2021-10-29 ENCOUNTER — DOCUMENTATION ONLY (OUTPATIENT)
Dept: SURGERY | Age: 41
End: 2021-10-29

## 2021-10-29 ENCOUNTER — TELEPHONE (OUTPATIENT)
Dept: SURGERY | Age: 41
End: 2021-10-29

## 2021-10-29 RX ORDER — SUCRALFATE 1 G/10ML
1 SUSPENSION ORAL 4 TIMES DAILY
Qty: 200 ML | Refills: 0 | Status: CANCELLED | OUTPATIENT
Start: 2021-10-29 | End: 2021-11-08

## 2021-10-29 RX ORDER — SUCRALFATE 1 G/10ML
0.5 SUSPENSION ORAL 4 TIMES DAILY
Qty: 600 ML | Refills: 5 | Status: SHIPPED | OUTPATIENT
Start: 2021-10-29 | End: 2022-02-21

## 2021-10-29 NOTE — TELEPHONE ENCOUNTER
Patient called wanting to update that due to the patient having [employer] as their insurance, they can only get the \"scope\" done with the emergency room. Patient stated they will be going to the emergency room Monday (11/1) to get this done. Also, patient explained that they are still vomiting blood and still not able to eat.  Please advise   CB: [phone]

## 2021-10-29 NOTE — LETTER
NOTIFICATION RETURN TO WORK / SCHOOL    10/29/2021 2:13 PM    Ms. Laura Madison  [facility] [address]          To Whom It May Concern:     Laura Madison is currently under the care of George Tatum.     She will return to work/school on: Tuesday 11/2/21 with no restrictions.     If there are questions or concerns please have the patient contact our office.           Sincerely,  Derek Galvez MD

## 2021-10-29 NOTE — PROGRESS NOTES
Spoke with Dr. Kala Kulkarni and he would like pt to see Dr. Ottoniel Meyer. Spoke with Arelis Obregon at [facility] and advised of MD rec.  She will fax referral and pt information to [facility]

## 2021-10-29 NOTE — TELEPHONE ENCOUNTER
MD sent in rx per pt request. Letter done to stay out of work until Monday and accessible to pt through [facility]. Advised pt she may still receive a call from [facility]/Dr. Hicks's office. MD is aware pt will come in Monday.

## 2021-11-15 ENCOUNTER — OFFICE VISIT (OUTPATIENT)
Dept: SURGERY | Age: 41
End: 2021-11-15
Payer: COMMERCIAL

## 2021-11-15 VITALS
OXYGEN SATURATION: 97 % | HEART RATE: 86 BPM | SYSTOLIC BLOOD PRESSURE: 106 MMHG | DIASTOLIC BLOOD PRESSURE: 74 MMHG | BODY MASS INDEX: 24.83 KG/M2 | WEIGHT: 149 LBS | HEIGHT: 65 IN

## 2021-11-15 DIAGNOSIS — E66.01 MORBID OBESITY (HCC): Primary | ICD-10-CM

## 2021-11-15 PROCEDURE — 99214 OFFICE O/P EST MOD 30 MIN: CPT | Performed by: SURGERY

## 2021-11-15 NOTE — PROGRESS NOTES
1. Have you been to the ER, urgent care clinic since your last visit? Hospitalized since your last visit? No    2. Have you seen or consulted any other health care providers outside of the [address] [place] [address] since your last visit? Include any pap smears or colon screening. Patient saw Dr. Salvador Galvan at [facility] on 11/12/21.

## 2021-11-16 ENCOUNTER — DOCUMENTATION ONLY (OUTPATIENT)
Dept: SURGERY | Age: 41
End: 2021-11-16

## 2021-11-16 NOTE — PROGRESS NOTES
Spoke with Veronica Amezcua at Dr. Sparks Solders office, requested office notes and results from EGD. She is sending over now. Patient is scheduled for upper GI series on 11/22/21 8am at [facility]. Labs ordered at office visit on 11/15/21.

## 2021-11-16 NOTE — PROGRESS NOTES
Surgery Progress Note    11/15/2021    CC: Follow up    Subjective:     Had EGD done last week showing significant GI stricture unable to pass scope. Waiting report. Still only eating chips. Actually lives with her mother and kids at home. Had original surgery in 2005 with Arun and subsequent SBR for perforation. No acute events the last few weeks. Ready to have surgery asap. Constitutional: No fever or chills  Neurologic: No headache  Eyes: No scleral icterus or irritated eyes  Nose: No nasal pain or drainage  Mouth: No oral lesions or sore throat  Cardiac: No palpations or chest pain  Pulmonary: No cough or shortness of breath  Gastrointestinal: Dysphagia, No nausea, emesis, diarrhea, or constipation  Genitourinary: No dysuria  Musculoskeletal: No muscle or joint tenderness  Skin: No rashes or lesions  Psychiatric: No anxiety or depressed mood    Objective:     Visit Vitals  /74 (BP 1 Location: Left upper arm, BP Patient Position: Sitting, BP Cuff Size: Adult)   Pulse 86   Ht 5' 5\" (1.651 m)   Wt 149 lb (67.6 kg)   SpO2 97%   BMI 24.79 kg/m²       General: No acute distress, conversant  Eyes: PERRLA, no scleral icterus  HENT: Normocephalic without oral lesions  Neck: Trachea midline without LAD  Cardiac: Normal pulse rate and rhythm  Pulmonary: Symmetric chest rise with normal effort  GI: Soft, NT, ND, no hernia, no splenomegaly  Skin: Warm without rash  Extremities: No edema or joint stiffness  Psych: Appropriate mood and affect    Assessment:     40 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 Will try to get GI note. Labs to be checked today. Daughter confirms she only eats chips. Concern about pre op nutrition. Will need GJ revision and truncal vagotomy given idiopathic ulcers. Will get UGI pre op. Follow up labs and will contact regarding nutrition. Start process for surgical approval for revision.  Risks and benefits discussed with patient including esophageal injury, gastric and liver injury, GJ leak, recurrent ulcer, stricture, perforation, surgical bleeding and infection, possible feeding tube placement. Patient understands all risks and wants to proceed ASAP.     Julius Nation MD  Bariatric and General Surgeon  [facility]

## 2021-11-22 ENCOUNTER — TELEPHONE (OUTPATIENT)
Dept: SURGERY | Age: 41
End: 2021-11-22

## 2021-11-22 ENCOUNTER — HOSPITAL ENCOUNTER (OUTPATIENT)
Dept: GENERAL RADIOLOGY | Age: 41
Discharge: HOME | End: 2021-11-22
Attending: SURGERY
Payer: COMMERCIAL

## 2021-11-22 ENCOUNTER — APPOINTMENT (OUTPATIENT)
Dept: GENERAL RADIOLOGY | Age: 41
End: 2021-11-22
Attending: SURGERY
Payer: COMMERCIAL

## 2021-11-22 DIAGNOSIS — E66.01 MORBID OBESITY (HCC): ICD-10-CM

## 2021-11-22 LAB
25(OH)D3+25(OH)D2 SERPL-MCNC: 29 NG/ML (ref 30–100)
ALBUMIN SERPL-MCNC: 4.3 G/DL (ref 3.8–4.8)
ALBUMIN/GLOB SERPL: 1.7 {RATIO} (ref 1.2–2.2)
ALP SERPL-CCNC: 62 IU/L (ref 44–121)
ALT SERPL-CCNC: 20 IU/L (ref 0–32)
AST SERPL-CCNC: 20 IU/L (ref 0–40)
BILIRUB SERPL-MCNC: 0.6 MG/DL (ref 0–1.2)
BUN SERPL-MCNC: 13 MG/DL (ref 6–24)
BUN/CREAT SERPL: 18 (ref 9–23)
CALCIUM SERPL-MCNC: 9.7 MG/DL (ref 8.7–10.2)
CHLORIDE SERPL-SCNC: 106 MMOL/L (ref 96–106)
CO2 SERPL-SCNC: 22 MMOL/L (ref 20–29)
CREAT SERPL-MCNC: 0.71 MG/DL (ref 0.57–1)
ERYTHROCYTE [DISTWIDTH] IN BLOOD BY AUTOMATED COUNT: 15.5 % (ref 11.7–15.4)
FOLATE SERPL-MCNC: 7.5 NG/ML
GLOBULIN SER CALC-MCNC: 2.6 G/DL (ref 1.5–4.5)
GLUCOSE SERPL-MCNC: 82 MG/DL (ref 65–99)
H PYLORI IGA SER-ACNC: <9 UNITS (ref 0–8.9)
H PYLORI IGG SER IA-ACNC: 0.14 INDEX VALUE (ref 0–0.79)
HCT VFR BLD AUTO: 38.8 % (ref 34–46.6)
HGB BLD-MCNC: 12.2 G/DL (ref 11.1–15.9)
IRON SATN MFR SERPL: 5 % (ref 15–55)
IRON SERPL-MCNC: 21 UG/DL (ref 27–159)
MCH RBC QN AUTO: 25.5 PG (ref 26.6–33)
MCHC RBC AUTO-ENTMCNC: 31.4 G/DL (ref 31.5–35.7)
MCV RBC AUTO: 81 FL (ref 79–97)
PLATELET # BLD AUTO: 201 X10E3/UL (ref 150–450)
POTASSIUM SERPL-SCNC: 4.8 MMOL/L (ref 3.5–5.2)
PROT SERPL-MCNC: 6.9 G/DL (ref 6–8.5)
RBC # BLD AUTO: 4.78 X10E6/UL (ref 3.77–5.28)
SODIUM SERPL-SCNC: 141 MMOL/L (ref 134–144)
TIBC SERPL-MCNC: 383 UG/DL (ref 250–450)
UIBC SERPL-MCNC: 362 UG/DL (ref 131–425)
VIT B1 BLD-SCNC: 70.2 NMOL/L (ref 66.5–200)
VIT B12 SERPL-MCNC: 820 PG/ML (ref 232–1245)
WBC # BLD AUTO: 3.9 X10E3/UL (ref 3.4–10.8)

## 2021-11-22 PROCEDURE — 74240 X-RAY XM UPR GI TRC 1CNTRST: CPT

## 2021-11-22 NOTE — Clinical Note
11/23/2021 2:31 PM    Ms. Mora Romero  [address] [address]              Sincerely,      Gene Page MD

## 2021-11-22 NOTE — Clinical Note
NOTIFICATION RETURN TO WORK / SCHOOL    11/23/2021 2:31 PM    Ms. Malloy [phone]      To Whom It May Concern:    Sheryle Merlin is currently under the care of George Tatum. She will return to work/school on: ***    If there are questions or concerns please have the patient contact our office.         Sincerely,      Cherelle Perez MD

## 2021-11-23 NOTE — TELEPHONE ENCOUNTER
Patient called stating she needs a Doctors note for work. Patient needs the note to cover dates 10-22 through 12/16. Patient does not need it to specify any lifting restrictions.

## 2021-11-23 NOTE — TELEPHONE ENCOUNTER
Spoke with patient. She will mychart me her supervisors email/fax and we will send out of work letters from office visits with us and updated out of work letter with dates patient referenced. Surgery is in the middle of getting scheduled.

## 2021-12-20 ENCOUNTER — TELEPHONE (OUTPATIENT)
Dept: SURGERY | Age: 41
End: 2021-12-20

## 2021-12-20 NOTE — TELEPHONE ENCOUNTER
Patient called wanting to know when they will receive a call to schedule their surgery. I saw in the patient's notes that their insurance hasn't been approved yet, but I informed the patient that I would transfer them to [facility] to discuss this further since the last update was from 12/1/2021. I explained to the pt that Feli may not answer, but they should still leave a VM.  Pt understood

## 2021-12-28 ENCOUNTER — TELEPHONE (OUTPATIENT)
Dept: SURGERY | Age: 41
End: 2021-12-28

## 2021-12-28 NOTE — TELEPHONE ENCOUNTER
Tried calling patient, left message. Advised patient I spoke with Feli and she is going to call patients insurance for a update today. The patient does have 2 insurances which does make this process a little longer than usual. Rayray Hardy has approved procedure we are just awaiting a response from Tanya. Again, advised patient Rae Eder will all her with a update and if she needs anything else from us to please call office back.

## 2021-12-28 NOTE — TELEPHONE ENCOUNTER
Patient called in stating that she has been waiting over a month for her revision and that she is not feeling well. She is experiencing stomach pain, nausea and vomiting, she stated that it hurts when she vomiting and that she is on a liquid diet.      Requested a call back from the nurse and was sent over to her surgery scheduler

## 2021-12-29 ENCOUNTER — TELEPHONE (OUTPATIENT)
Dept: SURGERY | Age: 41
End: 2021-12-29

## 2021-12-29 NOTE — TELEPHONE ENCOUNTER
Spoke with patient who states,she is spiting up blood. She has been trying to get her surgery schedule for a month. She is 39years old. She does not know what to do. She is not able to eat or drink. She is having abdominal pain Advised patient to come to emergency room.

## 2022-01-02 ENCOUNTER — HOSPITAL ENCOUNTER (OUTPATIENT)
Age: 42
Setting detail: OBSERVATION
Discharge: HOME | End: 2022-01-09
Attending: EMERGENCY MEDICINE | Admitting: SURGERY
Payer: COMMERCIAL

## 2022-01-02 DIAGNOSIS — K28.9 MARGINAL ULCER: ICD-10-CM

## 2022-01-02 DIAGNOSIS — K92.0 HEMATEMESIS WITH NAUSEA: Primary | ICD-10-CM

## 2022-01-02 DIAGNOSIS — E43 SEVERE PROTEIN-CALORIE MALNUTRITION (HCC): ICD-10-CM

## 2022-01-02 LAB
ALBUMIN SERPL-MCNC: 3.6 G/DL (ref 3.5–5)
ALBUMIN/GLOB SERPL: 1 {RATIO} (ref 1.1–2.2)
ALP SERPL-CCNC: 62 U/L (ref 45–117)
ALT SERPL-CCNC: 28 U/L (ref 12–78)
ANION GAP SERPL CALC-SCNC: 2 MMOL/L (ref 5–15)
AST SERPL-CCNC: 17 U/L (ref 15–37)
BASOPHILS # BLD: 0 K/UL (ref 0–0.1)
BASOPHILS NFR BLD: 0 % (ref 0–1)
BILIRUB SERPL-MCNC: 0.6 MG/DL (ref 0.2–1)
BUN SERPL-MCNC: 18 MG/DL (ref 6–20)
BUN/CREAT SERPL: 21 (ref 12–20)
CALCIUM SERPL-MCNC: 9.2 MG/DL (ref 8.5–10.1)
CHLORIDE SERPL-SCNC: 112 MMOL/L (ref 97–108)
CO2 SERPL-SCNC: 27 MMOL/L (ref 21–32)
COMMENT, HOLDF: NORMAL
CREAT SERPL-MCNC: 0.84 MG/DL (ref 0.55–1.02)
DIFFERENTIAL METHOD BLD: ABNORMAL
EOSINOPHIL # BLD: 0.2 K/UL (ref 0–0.4)
EOSINOPHIL NFR BLD: 3 % (ref 0–7)
ERYTHROCYTE [DISTWIDTH] IN BLOOD BY AUTOMATED COUNT: 15.5 % (ref 11.5–14.5)
GLOBULIN SER CALC-MCNC: 3.6 G/DL (ref 2–4)
GLUCOSE SERPL-MCNC: 83 MG/DL (ref 65–100)
HCT VFR BLD AUTO: 36.1 % (ref 35–47)
HGB BLD-MCNC: 11.2 G/DL (ref 11.5–16)
IMM GRANULOCYTES # BLD AUTO: 0 K/UL (ref 0–0.04)
IMM GRANULOCYTES NFR BLD AUTO: 0 % (ref 0–0.5)
LYMPHOCYTES # BLD: 2.1 K/UL (ref 0.8–3.5)
LYMPHOCYTES NFR BLD: 45 % (ref 12–49)
MCH RBC QN AUTO: 26 PG (ref 26–34)
MCHC RBC AUTO-ENTMCNC: 31 G/DL (ref 30–36.5)
MCV RBC AUTO: 83.8 FL (ref 80–99)
MONOCYTES # BLD: 0.4 K/UL (ref 0–1)
MONOCYTES NFR BLD: 9 % (ref 5–13)
NEUTS SEG # BLD: 2 K/UL (ref 1.8–8)
NEUTS SEG NFR BLD: 43 % (ref 32–75)
NRBC # BLD: 0 K/UL (ref 0–0.01)
NRBC BLD-RTO: 0 PER 100 WBC
PLATELET # BLD AUTO: 176 K/UL (ref 150–400)
PMV BLD AUTO: 12.9 FL (ref 8.9–12.9)
POTASSIUM SERPL-SCNC: 4 MMOL/L (ref 3.5–5.1)
PROT SERPL-MCNC: 7.2 G/DL (ref 6.4–8.2)
RBC # BLD AUTO: 4.31 M/UL (ref 3.8–5.2)
SAMPLES BEING HELD,HOLD: NORMAL
SODIUM SERPL-SCNC: 141 MMOL/L (ref 136–145)
WBC # BLD AUTO: 4.7 K/UL (ref 3.6–11)

## 2022-01-02 PROCEDURE — 96365 THER/PROPH/DIAG IV INF INIT: CPT

## 2022-01-02 PROCEDURE — 96374 THER/PROPH/DIAG INJ IV PUSH: CPT

## 2022-01-02 PROCEDURE — 96361 HYDRATE IV INFUSION ADD-ON: CPT

## 2022-01-02 PROCEDURE — 86870 RBC ANTIBODY IDENTIFICATION: CPT

## 2022-01-02 PROCEDURE — 85025 COMPLETE CBC W/AUTO DIFF WBC: CPT

## 2022-01-02 PROCEDURE — 99284 EMERGENCY DEPT VISIT MOD MDM: CPT

## 2022-01-02 PROCEDURE — 96366 THER/PROPH/DIAG IV INF ADDON: CPT

## 2022-01-02 PROCEDURE — 80053 COMPREHEN METABOLIC PANEL: CPT

## 2022-01-02 PROCEDURE — 96375 TX/PRO/DX INJ NEW DRUG ADDON: CPT

## 2022-01-02 PROCEDURE — 96376 TX/PRO/DX INJ SAME DRUG ADON: CPT

## 2022-01-02 PROCEDURE — 36415 COLL VENOUS BLD VENIPUNCTURE: CPT

## 2022-01-02 PROCEDURE — 86905 BLOOD TYPING RBC ANTIGENS: CPT

## 2022-01-02 PROCEDURE — 86900 BLOOD TYPING SEROLOGIC ABO: CPT

## 2022-01-03 PROBLEM — K28.9 GJU (GASTROJEJUNAL ULCER): Status: ACTIVE | Noted: 2022-01-03

## 2022-01-03 LAB
ABO + RH BLD: NORMAL
ANION GAP SERPL CALC-SCNC: 4 MMOL/L (ref 5–15)
ANTIGENS PRESENT BLD: NORMAL
BLOOD GROUP ANTIBODIES SERPL: NORMAL
BLOOD GROUP ANTIBODIES SERPL: NORMAL
BUN SERPL-MCNC: 19 MG/DL (ref 6–20)
BUN/CREAT SERPL: 22 (ref 12–20)
CALCIUM SERPL-MCNC: 8.8 MG/DL (ref 8.5–10.1)
CHLORIDE SERPL-SCNC: 114 MMOL/L (ref 97–108)
CO2 SERPL-SCNC: 24 MMOL/L (ref 21–32)
COMMENT, HOLDF: NORMAL
CREAT SERPL-MCNC: 0.86 MG/DL (ref 0.55–1.02)
GLUCOSE SERPL-MCNC: 94 MG/DL (ref 65–100)
POTASSIUM SERPL-SCNC: 3.6 MMOL/L (ref 3.5–5.1)
PREALB SERPL-MCNC: 18.5 MG/DL (ref 20–40)
SAMPLES BEING HELD,HOLD: NORMAL
SODIUM SERPL-SCNC: 142 MMOL/L (ref 136–145)
SPECIMEN EXP DATE BLD: NORMAL

## 2022-01-03 PROCEDURE — 74011000250 HC RX REV CODE- 250: Performed by: STUDENT IN AN ORGANIZED HEALTH CARE EDUCATION/TRAINING PROGRAM

## 2022-01-03 PROCEDURE — 74011250636 HC RX REV CODE- 250/636: Performed by: STUDENT IN AN ORGANIZED HEALTH CARE EDUCATION/TRAINING PROGRAM

## 2022-01-03 PROCEDURE — 80048 BASIC METABOLIC PNL TOTAL CA: CPT

## 2022-01-03 PROCEDURE — 84134 ASSAY OF PREALBUMIN: CPT

## 2022-01-03 PROCEDURE — C9113 INJ PANTOPRAZOLE SODIUM, VIA: HCPCS | Performed by: SURGERY

## 2022-01-03 PROCEDURE — 96375 TX/PRO/DX INJ NEW DRUG ADDON: CPT

## 2022-01-03 PROCEDURE — 74011000250 HC RX REV CODE- 250: Performed by: SURGERY

## 2022-01-03 PROCEDURE — 74011250636 HC RX REV CODE- 250/636: Performed by: SURGERY

## 2022-01-03 PROCEDURE — 74011250637 HC RX REV CODE- 250/637: Performed by: NURSE PRACTITIONER

## 2022-01-03 PROCEDURE — G0378 HOSPITAL OBSERVATION PER HR: HCPCS

## 2022-01-03 PROCEDURE — 96365 THER/PROPH/DIAG IV INF INIT: CPT

## 2022-01-03 PROCEDURE — 96376 TX/PRO/DX INJ SAME DRUG ADON: CPT

## 2022-01-03 PROCEDURE — 99219 PR INITIAL OBSERVATION CARE/DAY 50 MINUTES: CPT | Performed by: SURGERY

## 2022-01-03 PROCEDURE — 36415 COLL VENOUS BLD VENIPUNCTURE: CPT

## 2022-01-03 PROCEDURE — 96366 THER/PROPH/DIAG IV INF ADDON: CPT

## 2022-01-03 RX ORDER — DIPHENHYDRAMINE HYDROCHLORIDE 50 MG/ML
12.5 INJECTION, SOLUTION INTRAMUSCULAR; INTRAVENOUS
Status: DISCONTINUED | OUTPATIENT
Start: 2022-01-03 | End: 2022-01-09 | Stop reason: HOSPADM

## 2022-01-03 RX ORDER — PANTOPRAZOLE SODIUM 40 MG/10ML
40 INJECTION, POWDER, LYOPHILIZED, FOR SOLUTION INTRAVENOUS EVERY 12 HOURS
Status: DISCONTINUED | OUTPATIENT
Start: 2022-01-03 | End: 2022-01-07 | Stop reason: DRUGHIGH

## 2022-01-03 RX ORDER — SODIUM CHLORIDE 0.9 % (FLUSH) 0.9 %
5-40 SYRINGE (ML) INJECTION AS NEEDED
Status: DISCONTINUED | OUTPATIENT
Start: 2022-01-03 | End: 2022-01-09 | Stop reason: HOSPADM

## 2022-01-03 RX ORDER — ONDANSETRON 2 MG/ML
4 INJECTION INTRAMUSCULAR; INTRAVENOUS
Status: DISCONTINUED | OUTPATIENT
Start: 2022-01-03 | End: 2022-01-09 | Stop reason: HOSPADM

## 2022-01-03 RX ORDER — SODIUM CHLORIDE 0.9 % (FLUSH) 0.9 %
10 SYRINGE (ML) INJECTION EVERY 12 HOURS
Status: DISCONTINUED | OUTPATIENT
Start: 2022-01-03 | End: 2022-01-07 | Stop reason: DRUGHIGH

## 2022-01-03 RX ORDER — SERTRALINE HYDROCHLORIDE 50 MG/1
50 TABLET, FILM COATED ORAL ONCE
Status: COMPLETED | OUTPATIENT
Start: 2022-01-03 | End: 2022-01-03

## 2022-01-03 RX ORDER — SODIUM CHLORIDE, SODIUM LACTATE, POTASSIUM CHLORIDE, CALCIUM CHLORIDE 600; 310; 30; 20 MG/100ML; MG/100ML; MG/100ML; MG/100ML
100 INJECTION, SOLUTION INTRAVENOUS CONTINUOUS
Status: DISCONTINUED | OUTPATIENT
Start: 2022-01-03 | End: 2022-01-09 | Stop reason: HOSPADM

## 2022-01-03 RX ORDER — NALOXONE HYDROCHLORIDE 0.4 MG/ML
0.4 INJECTION, SOLUTION INTRAMUSCULAR; INTRAVENOUS; SUBCUTANEOUS AS NEEDED
Status: DISCONTINUED | OUTPATIENT
Start: 2022-01-03 | End: 2022-01-09 | Stop reason: HOSPADM

## 2022-01-03 RX ORDER — SODIUM CHLORIDE 0.9 % (FLUSH) 0.9 %
5-40 SYRINGE (ML) INJECTION EVERY 8 HOURS
Status: DISCONTINUED | OUTPATIENT
Start: 2022-01-03 | End: 2022-01-09 | Stop reason: HOSPADM

## 2022-01-03 RX ORDER — MORPHINE SULFATE 2 MG/ML
2-4 INJECTION, SOLUTION INTRAMUSCULAR; INTRAVENOUS
Status: DISCONTINUED | OUTPATIENT
Start: 2022-01-03 | End: 2022-01-06

## 2022-01-03 RX ADMIN — SERTRALINE 50 MG: 50 TABLET, FILM COATED ORAL at 13:14

## 2022-01-03 RX ADMIN — PANTOPRAZOLE SODIUM 40 MG: 40 INJECTION, POWDER, FOR SOLUTION INTRAVENOUS at 01:08

## 2022-01-03 RX ADMIN — ONDANSETRON HYDROCHLORIDE 4 MG: 2 SOLUTION INTRAMUSCULAR; INTRAVENOUS at 21:27

## 2022-01-03 RX ADMIN — THIAMINE HYDROCHLORIDE: 100 INJECTION, SOLUTION INTRAMUSCULAR; INTRAVENOUS at 10:07

## 2022-01-03 RX ADMIN — Medication 10 ML: at 13:16

## 2022-01-03 RX ADMIN — SODIUM CHLORIDE, PRESERVATIVE FREE 10 ML: 5 INJECTION INTRAVENOUS at 13:16

## 2022-01-03 RX ADMIN — SODIUM CHLORIDE, POTASSIUM CHLORIDE, SODIUM LACTATE AND CALCIUM CHLORIDE 100 ML/HR: 600; 310; 30; 20 INJECTION, SOLUTION INTRAVENOUS at 13:18

## 2022-01-03 RX ADMIN — PANTOPRAZOLE SODIUM 40 MG: 40 INJECTION, POWDER, FOR SOLUTION INTRAVENOUS at 13:14

## 2022-01-03 RX ADMIN — DIPHENHYDRAMINE HYDROCHLORIDE 12.5 MG: 50 INJECTION, SOLUTION INTRAMUSCULAR; INTRAVENOUS at 22:34

## 2022-01-03 NOTE — ED TRIAGE NOTES
Pt arrives c/o vomiting blood that started in OCT but had subsided. Pt reports the vomiting blood returned Friday. Pt was taking zofran. Pt reports she had a scheduled procedure but unable to wait. Pt had gastric bypass and ulcer. Pt reports losing 40 lbs recently due to not being able to eat.

## 2022-01-03 NOTE — H&P
Surgery History and Physical    Subjective:      Mela Swain is a 39 y.o. female who has a history of gastric bypass and is currently being seen by Dr. José Miguel Bhardwaj for a GJ stricture with previous GJ ulcers. He is planning revision when insurance improve all becomes available. She called the office about 4 days ago complaining of abdominal pain and vomiting blood. She states she has pain and difficulty keeping down liquids. She was instructed by the nurse in the office to go to the emergency department. She received a call the next day and was told to go to the hospital for hydration stabilization and possibly see if a revision could be done sooner. She was told to do this over the weekend. She therefore decided to come in on  night to have this done. She has no new complaints except for the current vomiting of blood which she has been doing all along. 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)       Past Surgical History:   Procedure Laterality Date    HX GASTRIC BYPASS      HX GI  2009    perforated intestines    HX GYN       x 4      Social History     Tobacco Use    Smoking status: Former Smoker    Smokeless tobacco: Never Used   Substance Use Topics    Alcohol use: No      Family History   Problem Relation Age of Onset    Diabetes Maternal Grandmother       Prior to Admission medications    Medication Sig Start Date End Date Taking?  Authorizing Provider   sucralfate (CARAFATE) 100 mg/mL suspension Take 5 mL by mouth four (4) times daily. 10/29/21   Josiah Santoro MD   omeprazole (PRILOSEC) 40 mg capsule Take 1 Capsule by mouth two (2) times a day. Open capsule over food and consume the power. DO NOT EAT CAPSULE INTACT. Patient not taking: Reported on 11/15/2021 10/25/21   Josiah Santoro MD   sertraline (ZOLOFT) 50 mg tablet  10/19/21   Other, MD Meghana   ondansetron (Zofran ODT) 4 mg disintegrating tablet Take 1 Tablet by mouth every eight (8) hours as needed for Nausea, Vomiting or Nausea or Vomiting. Patient not taking: Reported on 10/25/2021 10/21/21   Dawit Greene MD   medroxyPROGESTERone (DEPO-PROVERA) 150 mg/mL syrg 1 mL by IntraMUSCular route every three (3) months. Patient not taking: Reported on 11/15/2021 8/18/21   Jamee Marks MD   valACYclovir (VALTREX) 500 mg tablet Take 1 Tablet by mouth daily. Patient not taking: Reported on 11/15/2021 6/7/21   Jamee Marks MD   acyclovir (ZOVIRAX) 400 mg tablet Take on tablet by mouth 3x day for 5 days  Patient not taking: Reported on 11/15/2021 10/8/18   Jamee Marks MD     Allergies   Allergen Reactions    Codeine Other (comments)        Review of Systems:    Pertinent items are noted in the History of Present Illness. Objective:     Visit Vitals  /70 (BP 1 Location: Left lower arm, BP Patient Position: Sitting)   Pulse 76   Temp 97.5 °F (36.4 °C)   Resp 18   SpO2 100%       Physical Exam:    GENERAL: alert, cooperative, no distress, appears stated age, EYE: negative, THROAT & NECK: normal, LUNG: clear to auscultation bilaterally, HEART: regular rate and rhythm, ABDOMEN: soft, non-tender.  Bowel sounds normal. No masses,  no organomegaly, EXTREMITIES:  no edema, SKIN: Normal., NEUROLOGIC: negative, PSYCH: non focal    Imaging:  images and reports reviewed    Lab Review:    Recent Results (from the past 24 hour(s))   CBC WITH AUTOMATED DIFF    Collection Time: 01/02/22 10:18 PM   Result Value Ref Range    WBC 4.7 3.6 - 11.0 K/uL    RBC 4.31 3.80 - 5.20 M/uL    HGB 11.2 (L) 11.5 - 16.0 g/dL    HCT 36.1 35.0 - 47.0 %    MCV 83.8 80.0 - 99.0 FL    MCH 26.0 26.0 - 34.0 PG    MCHC 31.0 30.0 - 36.5 g/dL    RDW 15.5 (H) 11.5 - 14.5 %    PLATELET 629 370 - 725 K/uL    MPV 12.9 8.9 - 12.9 FL    NRBC 0.0 0  WBC    ABSOLUTE NRBC 0.00 0.00 - 0.01 K/uL    NEUTROPHILS 43 32 - 75 %    LYMPHOCYTES 45 12 - 49 %    MONOCYTES 9 5 - 13 %    EOSINOPHILS 3 0 - 7 %    BASOPHILS 0 0 - 1 %    IMMATURE GRANULOCYTES 0 0.0 - 0.5 %    ABS. NEUTROPHILS 2.0 1.8 - 8.0 K/UL    ABS. LYMPHOCYTES 2.1 0.8 - 3.5 K/UL    ABS. MONOCYTES 0.4 0.0 - 1.0 K/UL    ABS. EOSINOPHILS 0.2 0.0 - 0.4 K/UL    ABS. BASOPHILS 0.0 0.0 - 0.1 K/UL    ABS. IMM. GRANS. 0.0 0.00 - 0.04 K/UL    DF AUTOMATED     METABOLIC PANEL, COMPREHENSIVE    Collection Time: 01/02/22 10:18 PM   Result Value Ref Range    Sodium 141 136 - 145 mmol/L    Potassium 4.0 3.5 - 5.1 mmol/L    Chloride 112 (H) 97 - 108 mmol/L    CO2 27 21 - 32 mmol/L    Anion gap 2 (L) 5 - 15 mmol/L    Glucose 83 65 - 100 mg/dL    BUN 18 6 - 20 MG/DL    Creatinine 0.84 0.55 - 1.02 MG/DL    BUN/Creatinine ratio 21 (H) 12 - 20      GFR est AA >60 >60 ml/min/1.73m2    GFR est non-AA >60 >60 ml/min/1.73m2    Calcium 9.2 8.5 - 10.1 MG/DL    Bilirubin, total 0.6 0.2 - 1.0 MG/DL    ALT (SGPT) 28 12 - 78 U/L    AST (SGOT) 17 15 - 37 U/L    Alk.  phosphatase 62 45 - 117 U/L    Protein, total 7.2 6.4 - 8.2 g/dL    Albumin 3.6 3.5 - 5.0 g/dL    Globulin 3.6 2.0 - 4.0 g/dL    A-G Ratio 1.0 (L) 1.1 - 2.2     TYPE & SCREEN    Collection Time: 01/02/22 10:18 PM   Result Value Ref Range    Crossmatch Expiration 01/05/2022,2359     ABO/Rh(D) B POSITIVE     Antibody screen POS     Antibody ID PENDING    SAMPLES BEING HELD    Collection Time: 01/02/22 10:18 PM   Result Value Ref Range    SAMPLES BEING HELD 1RED     COMMENT        Add-on orders for these samples will be processed based on acceptable specimen integrity and analyte stability, which may vary by analyte. Assessment: Plan   70-year-old female status post gastric bypass with known stricture and ulcers. Patient is currently being worked up and trying to get approval for insurance for a revision. She was told to come in by the office. Will  Bring her in under observation for hydration and PPI. We will have her discuss with bariatric surgery in a.m. regarding further operative plans.   Overall she appears to be stable with normal H&H.

## 2022-01-03 NOTE — ED NOTES
Md marcus notified of pt desire to talk to surgeon about going home and coming back. Pt also states she has questions about her surgery.

## 2022-01-03 NOTE — CONSULTS
[facility].   [address] [place] [id number]        GASTROENTEROLOGY CONSULTATION NOTE  Will Jun s  [phone] office  [phone] NP/PA in-hospital cell phone M-F until 4:30PM  After 5PM or on weekends, please call  for physician on call        NAME:  Wally Buckley   :   1980   MRN:   [id number]       Referring Physician: Dr. Mirian Urena Date: 1/3/2022 9:51 AM    Chief Complaint: abdominal pain and vomiting blood     History of Present Illness:  Patient is a 39 y.o. who is seen in consultation at the request of Dr. Holley Estes for chronic marginal ulcer, new onset gastrointestinal hemorrhage. Patient has a past medical history significant for gastric bypass and followed by Dr. Jamison Salcedo for [address] [place] [address] with history of previous ulcers. Revision has been discussed. She presented to the ED with complaints of abdominal pain and vomiting blood. Patient was admitted to the hospital on 22. Patient reports a history of intermittent epigastric abdominal pain with associated nausea, vomiting, and hematemesis since 10/2021. She reports having had an EGD and UGI series at [facility] in 2021. She reports recurrent symptoms last Thursday that progressively worsened. Pain is concentrated to the epigastrium and described as a burning sensation. She reports multiple episodes of emesis with some blood. She reports constipation associated with use of Zofran at home. No hematochezia or melena. No fevers or chills. No NSAID use. No anticoagulation. No alcohol or tobacco use. EGD (21) by Dr. Salvador Sanches for history of gastric bypass/nausea/vomiting showed a normal esophagus; 12 cm pouch, 12 mm anastomotic ulcer with mild stenosis (unable to dilate due to ulcer) - biopsied, rest of the pouch was normal (biopsied); normal jejunum.   Pathology of the anastomotic ulcer showed anastomosed mucosa with surface ulceration, mixed inflammation, and detached necroinflammatory debris; pathology of the stomach showed minimal chronic gastritis, H. pylori was negative. No history of colonoscopy. I have reviewed the emergency room note, hospital admission note, notes by all other clinicians who have seen the patient during this hospitalization to date. I have reviewed the problem list and the reason for this hospitalization. I have reviewed the allergies and the medications the patient was taking at home prior to this hospitalization. PMH:  Past Medical History:   Diagnosis Date    Anemia     GERD (gastroesophageal reflux disease)     Hepatitis C 2010    treated    Hx of abnormal Pap smear     Ill-defined condition     anemia hx, has received several blood transfusion, last blood transfusion May 5 2014    Intestinal perforation ([facility] [address]        PSH:  Past Surgical History:   Procedure Laterality Date    HX GASTRIC BYPASS      HX GI  2009    perforated intestines    HX GYN       x 4       Allergies: Allergies   Allergen Reactions    Codeine Other (comments)       Home Medications:  Prior to Admission Medications   Prescriptions Last Dose Informant Patient Reported? Taking? medroxyPROGESTERone (DEPO-PROVERA) 150 mg/mL syrg   No No   Si mL by IntraMUSCular route every three (3) months. Patient not taking: Reported on 11/15/2021   omeprazole (PRILOSEC) 40 mg capsule   No No   Sig: Take 1 Capsule by mouth two (2) times a day. Open capsule over food and consume the power. DO NOT EAT CAPSULE INTACT. Patient not taking: Reported on 11/15/2021   ondansetron (Zofran ODT) 4 mg disintegrating tablet   No No   Sig: Take 1 Tablet by mouth every eight (8) hours as needed for Nausea, Vomiting or Nausea or Vomiting. Patient not taking: Reported on 10/25/2021   sertraline (ZOLOFT) 50 mg tablet   Yes No   sucralfate (CARAFATE) 100 mg/mL suspension   No No   Sig: Take 5 mL by mouth four (4) times daily.       Facility-Administered Medications: None       Hospital Medications:  Current Facility-Administered Medications   Medication Dose Route Frequency    sodium chloride (NS) flush 5-40 mL  5-40 mL IntraVENous Q8H    sodium chloride (NS) flush 5-40 mL  5-40 mL IntraVENous PRN    acetaminophen (TYLENOL) solution 650 mg  650 mg Oral Q6H PRN    morphine injection 2-4 mg  2-4 mg IntraVENous Q3H PRN    naloxone (NARCAN) injection 0.4 mg  0.4 mg IntraVENous PRN    ondansetron (ZOFRAN) injection 4 mg  4 mg IntraVENous Q4H PRN    diphenhydrAMINE (BENADRYL) injection 12.5 mg  12.5 mg IntraVENous Q6H PRN    pantoprazole (PROTONIX) injection 40 mg  40 mg IntraVENous Q12H    And    sodium chloride (NS) flush 10 mL  10 mL IntraVENous Q12H    lactated Ringers infusion  100 mL/hr IntraVENous CONTINUOUS    0.9% sodium chloride 1,000 mL with mvi (adult no. 4 with vit K) 10 mL, thiamine 320 mg, folic acid 1 mg infusion   IntraVENous ONCE     Current Outpatient Medications   Medication Sig    sucralfate (CARAFATE) 100 mg/mL suspension Take 5 mL by mouth four (4) times daily.  omeprazole (PRILOSEC) 40 mg capsule Take 1 Capsule by mouth two (2) times a day. Open capsule over food and consume the power. DO NOT EAT CAPSULE INTACT. (Patient not taking: Reported on 11/15/2021)    sertraline (ZOLOFT) 50 mg tablet     ondansetron (Zofran ODT) 4 mg disintegrating tablet Take 1 Tablet by mouth every eight (8) hours as needed for Nausea, Vomiting or Nausea or Vomiting. (Patient not taking: Reported on 10/25/2021)    medroxyPROGESTERone (DEPO-PROVERA) 150 mg/mL syrg 1 mL by IntraMUSCular route every three (3) months.  (Patient not taking: Reported on 11/15/2021)       Social History:  Social History     Tobacco Use    Smoking status: Former Smoker    Smokeless tobacco: Never Used   Substance Use Topics    Alcohol use: No       Family History:  Family History   Problem Relation Age of Onset    Diabetes Maternal Grandmother        Review of Systems:  Constitutional: negative fever, negative chills, + weight loss  Eyes:   negative visual changes  ENT:   negative sore throat, tongue or lip swelling  Respiratory:  negative cough, negative dyspnea  Cards:  negative for chest pain, palpitations, lower extremity edema  GI:   See HPI  :  negative for frequency, dysuria  Integument:  negative for rash and pruritus  Heme:  negative for easy bruising and gum/nose bleeding  Musculoskeletal:negative for myalgias, back pain and muscle weakness  Neuro:  negative for headaches, dizziness  Psych: negative for feelings of anxiety, depression     Objective:     Patient Vitals for the past 8 hrs:   BP Temp Pulse Resp SpO2   01/03/22 0833 117/72 97.9 °F (36.6 °C) 81 16 97 %     No intake/output data recorded. No intake/output data recorded. EXAM:     CONST:  Pleasant male lying in bed, no acute distress, coffee and juice at bedside   NEURO:  Alert and oriented x 3   HEENT: EOMI, no scleral icterus   LUNGS: No acute respiratory distress   CARD:  S1 S2   ABD:  Soft, non distended, mild epigastric tenderness, no rebound, no guarding. + Bowel sounds. EXT:  Warm   PSYCH: Full, not anxious or agitated     Data Review     Recent Labs     01/02/22  2218   WBC 4.7   HGB 11.2*   HCT 36.1        Recent Labs     01/03/22  0119 01/02/22  2218    141   K 3.6 4.0   * 112*   CO2 24 27   BUN 19 18   CREA 0.86 0.84   GLU 94 83   CA 8.8 9.2     Recent Labs     01/02/22  2218   AP 62   TP 7.2   ALB 3.6   GLOB 3.6     No results for input(s): INR, PTP, APTT, INREXT in the last 72 hours. EGD (4/8/21) by Dr. [facility] for history of gastric bypass/nausea/vomiting showed a normal esophagus; 12 cm pouch, 12 mm anastomotic ulcer with mild stenosis (unable to dilate due to ulcer) - biopsied, rest of the pouch was normal (biopsied); normal jejunum.   Pathology of the anastomotic ulcer showed anastomosed mucosa with surface ulceration, mixed inflammation, and detached necroinflammatory debris; pathology of the stomach showed minimal chronic gastritis, H. pylori was negative. Assessment:   · Nausea/vomiting with hematemesis and epigastric abdominal pain: Hgb 11.2 yesterday, platelets 980. EGD in 4/2021 as above. EGD and UGI series in 11/2021 at [facility] (no records). No anticoagulation. Differential includes anastomotic ulcer, peptic ulcer disease, and Chantelle-Stephen tear. · History of anastomotic ulcer in 4/2021: EGD as above. · History of anastomotic stricture status post dilation in 2007 by Dr. Tom Arellano  · History of gastric bypass in 2005     Patient Active Problem List   Diagnosis Code    Dysphagia R13.10    Vitamin D deficiency E55.9    Microcytic anemia D50.9    Marginal ulcer K28.9    Split ear lobe Q17.8    Epidermoid cyst of neck L72.0    GJU (gastrojejunal ulcer) K28.9     Plan:   · NPO  · PPI BID  · Trend CBC and transfuse as necessary  · Plan for EGD today with Dr. Ash Moya. Details and risks of the procedure to include (but not limited to) anesthesia, bleeding, infection, and perforation were discussed. Patient understands and is in agreement.  Please obtain rapid COVID test.   · Surgery following - plan for laparoscopic gastrostomy via excluded stomach  · Patient was discussed with Dr. Ash Moya  · Thank you for allowing me to participate in care of Yovany     Signed By: Gabby Augustin     1/3/2022  9:51 AM

## 2022-01-03 NOTE — ED PROVIDER NOTES
HPI   38 yo F presents with vomiting blood onset October. Last episode was yesterday. Took zofran today, no vomiting today. Denies bloody or black stools. Vomit was blood tinged. Hx of gastric bypass and ulcer. Denies fever, chills. C/o mild abdominal pain only with eating, 4/10. Had endoscopy and upper GI in November. Told by Dr. Pam Grodon to come to ED. Has revision of gastric bypass in January. Past Medical History:   Diagnosis Date    Anemia     GERD (gastroesophageal reflux disease)     Hepatitis C 2010    treated    Hx of abnormal Pap smear     Ill-defined condition     anemia hx, has received several blood transfusion, last blood transfusion May 5 2014    Intestinal perforation [facility])        Past Surgical History:   Procedure Laterality Date    HX GASTRIC BYPASS  2005    HX GI  2009    perforated intestines    HX GYN       x 4         Family History:   Problem Relation Age of Onset    Diabetes Maternal Grandmother        Social History     Socioeconomic History    Marital status: SINGLE     Spouse name: Not on file    Number of children: Not on file    Years of education: Not on file    Highest education level: Not on file   Occupational History    Not on file   Tobacco Use    Smoking status: Former Smoker    Smokeless tobacco: Never Used   Vaping Use    Vaping Use: Never used   Substance and Sexual Activity    Alcohol use: No    Drug use: No    Sexual activity: Yes     Partners: Male     Birth control/protection: None   Other Topics Concern    Not on file   Social History Narrative    Not on file     Social Determinants of Health     Financial Resource Strain:     Difficulty of Paying Living Expenses: Not on file   Food Insecurity:     Worried About Running Out of Food in the Last Year: Not on file    Larissa of Food in the Last Year: Not on file   Transportation Needs:     Lack of Transportation (Medical): Not on file    Lack of Transportation (Non-Medical):  Not on file   Physical Activity:     Days of Exercise per Week: Not on file    Minutes of Exercise per Session: Not on file   Stress:     Feeling of Stress : Not on file   Social Connections:     Frequency of Communication with Friends and Family: Not on file    Frequency of Social Gatherings with Friends and Family: Not on file    Attends Judaism Services: Not on file    Active Member of [address] [place] [address] or Organizations: Not on file    Attends Club or Organization Meetings: Not on file    Marital Status: Not on file   Intimate Partner Violence:     Fear of Current or Ex-Partner: Not on file    Emotionally Abused: Not on file    Physically Abused: Not on file    Sexually Abused: Not on file   Housing Stability:     Unable to Pay for Housing in the Last Year: Not on file    Number of [place] in the Last Year: Not on file    Unstable Housing in the Last Year: Not on file         ALLERGIES: Codeine    Review of Systems   Constitutional: Negative for chills and fever. Respiratory: Negative for cough and shortness of breath. Cardiovascular: Negative for chest pain. Gastrointestinal: Positive for abdominal pain, nausea and vomiting. Negative for blood in stool, constipation and diarrhea. Genitourinary: Negative for dysuria and hematuria. Skin: Negative for rash. Neurological: Negative for headaches. All other systems reviewed and are negative.       Vitals:    01/02/22 2052   BP: 113/70   Pulse: 76   Resp: 18   Temp: 97.5 °F (36.4 °C)   SpO2: 100%            Physical Exam   Physical Examination: General appearance - alert, well appearing, and in no distress, oriented to person, place, and time and normal appearing weight  Eyes - pupils equal and reactive, extraocular eye movements intact  Neck - supple, no significant adenopathy  Chest - clear to auscultation, no wheezes, rales or rhonchi, symmetric air entry  Heart - normal rate, regular rhythm, normal S1, S2, no murmurs, rubs, clicks or gallops  Abdomen - soft, nontender, nondistended, no masses or organomegaly  Back exam - full range of motion, no tenderness, palpable spasm or pain on motion  Neurological - alert, oriented, normal speech, no focal findings or movement disorder noted  Musculoskeletal - no joint tenderness, deformity or swelling  Extremities - peripheral pulses normal, no pedal edema, no clubbing or cyanosis  Skin - normal coloration and turgor, no rashes, no suspicious skin lesions noted  MDM  Number of Diagnoses or Management Options     Amount and/or Complexity of Data Reviewed  Clinical lab tests: ordered and reviewed  Decide to obtain previous medical records or to obtain history from someone other than the patient: yes  Review and summarize past medical records: yes  Discuss the patient with other providers: yes (General surgery)    Patient Progress  Patient progress: stable         Procedures  10:56 PM  Discussed with Dr. Demi Ferguson, general surgery. Will evaluate pt for admission.

## 2022-01-03 NOTE — PROGRESS NOTES
Patient independently interviewed and examined; agree with Dr. Poly Gonzalez and plan. She has chronic marginal ulcer, now with upper gastrointestinal hemorrhage; she notes 17 pound weight loss over the last 6 weeks, only able to tolerate soft foods. She has lost nearly all of her hair, now losing teeth with chronic fatigue. She is scheduled for laparoscopic resection/reconstruction of gastrojejunostomy for management of chronic marginal ulcer. I recommended upper endoscopy to assess for ongoing bleeding, with banana bag. Recommended laparoscopic gastrostomy via excluded stomach this week to allow repletion of protein stores, volume status; she understands this may require delay in definitive revision of her gastrojejunostomy as performing this revision under optimal conditions would decrease complication rate. She agrees with this plan. All questions answered. Remain n.p.o. Continue acid suppression.

## 2022-01-04 PROBLEM — E43 SEVERE PROTEIN-CALORIE MALNUTRITION (HCC): Status: ACTIVE | Noted: 2022-01-04

## 2022-01-04 PROBLEM — K92.2 UPPER GASTROINTESTINAL HEMORRHAGE: Status: ACTIVE | Noted: 2022-01-04

## 2022-01-04 LAB
BASOPHILS # BLD: 0 K/UL (ref 0–0.1)
BASOPHILS NFR BLD: 0 % (ref 0–1)
COVID-19 RAPID TEST, COVR: DETECTED
DIFFERENTIAL METHOD BLD: ABNORMAL
EOSINOPHIL # BLD: 0.2 K/UL (ref 0–0.4)
EOSINOPHIL NFR BLD: 4 % (ref 0–7)
ERYTHROCYTE [DISTWIDTH] IN BLOOD BY AUTOMATED COUNT: 15.6 % (ref 11.5–14.5)
HCT VFR BLD AUTO: 32.2 % (ref 35–47)
HGB BLD-MCNC: 10.2 G/DL (ref 11.5–16)
IMM GRANULOCYTES # BLD AUTO: 0 K/UL (ref 0–0.04)
IMM GRANULOCYTES NFR BLD AUTO: 0 % (ref 0–0.5)
LYMPHOCYTES # BLD: 1.8 K/UL (ref 0.8–3.5)
LYMPHOCYTES NFR BLD: 42 % (ref 12–49)
MAGNESIUM SERPL-MCNC: 1.8 MG/DL (ref 1.6–2.4)
MCH RBC QN AUTO: 26.3 PG (ref 26–34)
MCHC RBC AUTO-ENTMCNC: 31.7 G/DL (ref 30–36.5)
MCV RBC AUTO: 83 FL (ref 80–99)
MONOCYTES # BLD: 0.4 K/UL (ref 0–1)
MONOCYTES NFR BLD: 9 % (ref 5–13)
NEUTS SEG # BLD: 1.9 K/UL (ref 1.8–8)
NEUTS SEG NFR BLD: 45 % (ref 32–75)
NRBC # BLD: 0 K/UL (ref 0–0.01)
NRBC BLD-RTO: 0 PER 100 WBC
PHOSPHATE SERPL-MCNC: 3.3 MG/DL (ref 2.6–4.7)
PLATELET # BLD AUTO: 131 K/UL (ref 150–400)
PMV BLD AUTO: 12.4 FL (ref 8.9–12.9)
RBC # BLD AUTO: 3.88 M/UL (ref 3.8–5.2)
SOURCE, COVRS: ABNORMAL
WBC # BLD AUTO: 4.3 K/UL (ref 3.6–11)

## 2022-01-04 PROCEDURE — 84100 ASSAY OF PHOSPHORUS: CPT

## 2022-01-04 PROCEDURE — 74011250637 HC RX REV CODE- 250/637: Performed by: NURSE PRACTITIONER

## 2022-01-04 PROCEDURE — 74011000250 HC RX REV CODE- 250: Performed by: SURGERY

## 2022-01-04 PROCEDURE — G0378 HOSPITAL OBSERVATION PER HR: HCPCS

## 2022-01-04 PROCEDURE — 96376 TX/PRO/DX INJ SAME DRUG ADON: CPT

## 2022-01-04 PROCEDURE — 83735 ASSAY OF MAGNESIUM: CPT

## 2022-01-04 PROCEDURE — C9113 INJ PANTOPRAZOLE SODIUM, VIA: HCPCS | Performed by: SURGERY

## 2022-01-04 PROCEDURE — 85025 COMPLETE CBC W/AUTO DIFF WBC: CPT

## 2022-01-04 PROCEDURE — 74011250636 HC RX REV CODE- 250/636: Performed by: SURGERY

## 2022-01-04 PROCEDURE — 36415 COLL VENOUS BLD VENIPUNCTURE: CPT

## 2022-01-04 PROCEDURE — 99224 PR SBSQ OBSERVATION CARE/DAY 15 MINUTES: CPT | Performed by: SURGERY

## 2022-01-04 PROCEDURE — 74011250637 HC RX REV CODE- 250/637: Performed by: SURGERY

## 2022-01-04 PROCEDURE — 87635 SARS-COV-2 COVID-19 AMP PRB: CPT

## 2022-01-04 RX ORDER — SUCRALFATE 1 G/1
0.5 TABLET ORAL
Status: DISCONTINUED | OUTPATIENT
Start: 2022-01-04 | End: 2022-01-09 | Stop reason: HOSPADM

## 2022-01-04 RX ORDER — SERTRALINE HYDROCHLORIDE 50 MG/1
50 TABLET, FILM COATED ORAL DAILY
Status: DISCONTINUED | OUTPATIENT
Start: 2022-01-04 | End: 2022-01-09 | Stop reason: HOSPADM

## 2022-01-04 RX ORDER — SUCRALFATE 1 G/10ML
0.5 SUSPENSION ORAL 4 TIMES DAILY
Status: DISCONTINUED | OUTPATIENT
Start: 2022-01-04 | End: 2022-01-04 | Stop reason: SDUPTHER

## 2022-01-04 RX ADMIN — SERTRALINE 50 MG: 50 TABLET, FILM COATED ORAL at 13:58

## 2022-01-04 RX ADMIN — ACETAMINOPHEN ORAL SOLUTION 650 MG: 650 SOLUTION ORAL at 21:35

## 2022-01-04 RX ADMIN — PANTOPRAZOLE SODIUM 40 MG: 40 INJECTION, POWDER, FOR SOLUTION INTRAVENOUS at 00:59

## 2022-01-04 RX ADMIN — Medication 10 ML: at 01:00

## 2022-01-04 RX ADMIN — SODIUM CHLORIDE, PRESERVATIVE FREE 10 ML: 5 INJECTION INTRAVENOUS at 21:35

## 2022-01-04 RX ADMIN — SODIUM CHLORIDE, POTASSIUM CHLORIDE, SODIUM LACTATE AND CALCIUM CHLORIDE 100 ML/HR: 600; 310; 30; 20 INJECTION, SOLUTION INTRAVENOUS at 21:42

## 2022-01-04 RX ADMIN — DIPHENHYDRAMINE HYDROCHLORIDE 12.5 MG: 50 INJECTION, SOLUTION INTRAMUSCULAR; INTRAVENOUS at 21:35

## 2022-01-04 RX ADMIN — PANTOPRAZOLE SODIUM 40 MG: 40 INJECTION, POWDER, FOR SOLUTION INTRAVENOUS at 12:42

## 2022-01-04 RX ADMIN — Medication 10 ML: at 12:43

## 2022-01-04 NOTE — ED NOTES
Bedside and Verbal shift change report given to Rosa Reece (oncoming nurse) by Frank Dupree (offgoing nurse). Report included the following information SBAR, ED Summary and MAR.

## 2022-01-04 NOTE — ED NOTES
Bedside shift change report given to [employer] (oncoming nurse) by Kim Mendieta (offgoing nurse). Report included the following information SBAR, Kardex, OR Summary and MAR.

## 2022-01-04 NOTE — PERIOP NOTES
Kay Connelly TRANSFER - IN REPORT:    Verbal report received from Evan(name) on Liliana Gardiner  being received from ED # 14(unit) for ordered procedure      Report consisted of patients Situation, Background, Assessment and   Recommendations(SBAR). Information from the following report(s) SBAR, Kardex and MAR was reviewed with the receiving nurse. Opportunity for questions and clarification was provided.       Covid test will be ordered

## 2022-01-04 NOTE — PROGRESS NOTES
Progress Note    Patient: Ramon Navarro MRN: [id number]  SSN: [id number]    YOB: 1980  Age: 39 y.o. Sex: female      Admit Date: 2022      Subjective:     Patient is frustrated as she is still in the emergency department and that she tested positive for COVID-19; she is thirsty. Objective:     Visit Vitals  /64   Pulse 72   Temp 98.5 °F (36.9 °C)   Resp 18   SpO2 96%       Temp (24hrs), Av °F (36.7 °C), Min:97.6 °F (36.4 °C), Max:98.5 °F (36.9 °C)      Physical Exam:    ABDOMEN: Nondistended    Data Review: Vital signs, ins/outs, labs    Lab Review:   Recent Results (from the past 24 hour(s))   CBC WITH AUTOMATED DIFF    Collection Time: 22  2:53 AM   Result Value Ref Range    WBC 4.3 3.6 - 11.0 K/uL    RBC 3.88 3.80 - 5.20 M/uL    HGB 10.2 (L) 11.5 - 16.0 g/dL    HCT 32.2 (L) 35.0 - 47.0 %    MCV 83.0 80.0 - 99.0 FL    MCH 26.3 26.0 - 34.0 PG    MCHC 31.7 30.0 - 36.5 g/dL    RDW 15.6 (H) 11.5 - 14.5 %    PLATELET 315 (L) 433 - 400 K/uL    MPV 12.4 8.9 - 12.9 FL    NRBC 0.0 0  WBC    ABSOLUTE NRBC 0.00 0.00 - 0.01 K/uL    NEUTROPHILS 45 32 - 75 %    LYMPHOCYTES 42 12 - 49 %    MONOCYTES 9 5 - 13 %    EOSINOPHILS 4 0 - 7 %    BASOPHILS 0 0 - 1 %    IMMATURE GRANULOCYTES 0 0.0 - 0.5 %    ABS. NEUTROPHILS 1.9 1.8 - 8.0 K/UL    ABS. LYMPHOCYTES 1.8 0.8 - 3.5 K/UL    ABS. MONOCYTES 0.4 0.0 - 1.0 K/UL    ABS. EOSINOPHILS 0.2 0.0 - 0.4 K/UL    ABS. BASOPHILS 0.0 0.0 - 0.1 K/UL    ABS. IMM.  GRANS. 0.0 0.00 - 0.04 K/UL    DF AUTOMATED     MAGNESIUM    Collection Time: 22  2:53 AM   Result Value Ref Range    Magnesium 1.8 1.6 - 2.4 mg/dL   PHOSPHORUS    Collection Time: 22  2:53 AM   Result Value Ref Range    Phosphorus 3.3 2.6 - 4.7 MG/DL   COVID-19 RAPID TEST    Collection Time: 22  8:28 AM   Result Value Ref Range    Specimen source Nasopharyngeal      COVID-19 rapid test Detected (A) NOTD         Assessment:     Hospital Problems  Date Reviewed: 2022 Codes Class Noted POA    Upper gastrointestinal hemorrhage ICD-10-CM: K92.2  ICD-9-CM: 578.9  1/4/2022 Yes        Severe protein-calorie malnutrition ([facility] [address] ICD-10-CM: E43  ICD-9-CM: 605  1/4/2022 Yes        * (Principal) Natalia Cheatham (gastrojejunal ulcer) ICD-10-CM: K28.9  ICD-9-CM: 534.90  1/3/2022 Yes              Plan/Recommendations/Medical Decision Making:     Findings discussed with gastroenterology-we will order PCR COVID-19 test, and hold off on endoscopy for now as hemoglobin is stable. Allow clear liquids; n.p.o. after midnight. He still desires to proceed with feeding tube tomorrow. We will need to change order of cases given her COVID-19 positive status. Continue Protonix.

## 2022-01-04 NOTE — ED NOTES
TRANSFER - OUT REPORT:    Verbal report given to Marixa RN(name) on Bettye Solo  being transferred to Endoscopy(unit) for routine progression of care       Report consisted of patients Situation, Background, Assessment and   Recommendations(SBAR). Information from the following report(s) SBAR, ED Summary, [facility] and Recent Results was reviewed with the receiving nurse. Lines:   Peripheral IV 01/02/22 Left Antecubital (Active)   Site Assessment Clean, dry, & intact 01/02/22 2219   Phlebitis Assessment 0 01/02/22 2219   Infiltration Assessment 0 01/02/22 2219   Dressing Status Clean, dry, & intact 01/02/22 2219   Dressing Type Transparent 01/02/22 2219   Hub Color/Line Status Pink;Flushed;Patent 01/02/22 2219   Action Taken Blood drawn 01/02/22 2219        Opportunity for questions and clarification was provided.       Patient transported with:   Registered Nurse

## 2022-01-05 ENCOUNTER — ANESTHESIA EVENT (OUTPATIENT)
Dept: SURGERY | Age: 42
End: 2022-01-05
Payer: COMMERCIAL

## 2022-01-05 ENCOUNTER — ANESTHESIA (OUTPATIENT)
Dept: SURGERY | Age: 42
End: 2022-01-05
Payer: COMMERCIAL

## 2022-01-05 LAB

## 2022-01-05 PROCEDURE — 77030008608 HC TRCR ENDOSC SMTH AMR -B: Performed by: SURGERY

## 2022-01-05 PROCEDURE — 74011250636 HC RX REV CODE- 250/636: Performed by: SURGERY

## 2022-01-05 PROCEDURE — 74011250636 HC RX REV CODE- 250/636: Performed by: NURSE PRACTITIONER

## 2022-01-05 PROCEDURE — 96376 TX/PRO/DX INJ SAME DRUG ADON: CPT

## 2022-01-05 PROCEDURE — 74011250637 HC RX REV CODE- 250/637: Performed by: NURSE PRACTITIONER

## 2022-01-05 PROCEDURE — 74011250637 HC RX REV CODE- 250/637: Performed by: SURGERY

## 2022-01-05 PROCEDURE — G0378 HOSPITAL OBSERVATION PER HR: HCPCS

## 2022-01-05 PROCEDURE — 76210000006 HC OR PH I REC 0.5 TO 1 HR: Performed by: SURGERY

## 2022-01-05 PROCEDURE — 77030026438 HC STYL ET INTUB CARD -A: Performed by: ANESTHESIOLOGY

## 2022-01-05 PROCEDURE — 43653 LAPAROSCOPY GASTROSTOMY: CPT | Performed by: SURGERY

## 2022-01-05 PROCEDURE — 77030040361 HC SLV COMPR DVT MDII -B: Performed by: SURGERY

## 2022-01-05 PROCEDURE — 74011000250 HC RX REV CODE- 250: Performed by: NURSE PRACTITIONER

## 2022-01-05 PROCEDURE — C9113 INJ PANTOPRAZOLE SODIUM, VIA: HCPCS | Performed by: SURGERY

## 2022-01-05 PROCEDURE — 77030020829: Performed by: SURGERY

## 2022-01-05 PROCEDURE — 77030008684 HC TU ET CUF COVD -B: Performed by: ANESTHESIOLOGY

## 2022-01-05 PROCEDURE — 77030037032 HC INSRT SCIS CLICKLLINE DISP STOR -B: Performed by: SURGERY

## 2022-01-05 PROCEDURE — 76010000153 HC OR TIME 1.5 TO 2 HR: Performed by: SURGERY

## 2022-01-05 PROCEDURE — 74011000250 HC RX REV CODE- 250: Performed by: SURGERY

## 2022-01-05 PROCEDURE — 43653 LAPAROSCOPY GASTROSTOMY: CPT | Performed by: PHYSICIAN ASSISTANT

## 2022-01-05 PROCEDURE — 77030010507 HC ADH SKN DERMBND J&J -B: Performed by: SURGERY

## 2022-01-05 PROCEDURE — 77030005103 HC CATH GASTMY BLN HALY -B: Performed by: SURGERY

## 2022-01-05 PROCEDURE — 77030008023 HC RELD SUT ENDOSC COVD -B: Performed by: SURGERY

## 2022-01-05 PROCEDURE — 77030008756 HC TU IRR SUC STRY -B: Performed by: SURGERY

## 2022-01-05 PROCEDURE — 77030039895 HC SYST SMK EVAC LAP COVD -B: Performed by: SURGERY

## 2022-01-05 PROCEDURE — 77030002895 HC DEV VASC CLOSR COVD -B: Performed by: SURGERY

## 2022-01-05 PROCEDURE — 2709999900 HC NON-CHARGEABLE SUPPLY: Performed by: SURGERY

## 2022-01-05 PROCEDURE — 77030002933 HC SUT MCRYL J&J -A: Performed by: SURGERY

## 2022-01-05 PROCEDURE — 77030009957 HC RELD ENDOSTCH COVD -C: Performed by: SURGERY

## 2022-01-05 PROCEDURE — 0202U NFCT DS 22 TRGT SARS-COV-2: CPT

## 2022-01-05 PROCEDURE — 77030012770 HC TRCR OPT FX AMR -B: Performed by: SURGERY

## 2022-01-05 PROCEDURE — 76060000034 HC ANESTHESIA 1.5 TO 2 HR: Performed by: SURGERY

## 2022-01-05 RX ORDER — ONDANSETRON 2 MG/ML
INJECTION INTRAMUSCULAR; INTRAVENOUS AS NEEDED
Status: DISCONTINUED | OUTPATIENT
Start: 2022-01-05 | End: 2022-01-05 | Stop reason: HOSPADM

## 2022-01-05 RX ORDER — LIDOCAINE HYDROCHLORIDE 20 MG/ML
INJECTION, SOLUTION EPIDURAL; INFILTRATION; INTRACAUDAL; PERINEURAL AS NEEDED
Status: DISCONTINUED | OUTPATIENT
Start: 2022-01-05 | End: 2022-01-05 | Stop reason: HOSPADM

## 2022-01-05 RX ORDER — SODIUM CHLORIDE, SODIUM LACTATE, POTASSIUM CHLORIDE, CALCIUM CHLORIDE 600; 310; 30; 20 MG/100ML; MG/100ML; MG/100ML; MG/100ML
100 INJECTION, SOLUTION INTRAVENOUS CONTINUOUS
Status: DISCONTINUED | OUTPATIENT
Start: 2022-01-05 | End: 2022-01-05 | Stop reason: HOSPADM

## 2022-01-05 RX ORDER — MORPHINE SULFATE 2 MG/ML
2 INJECTION, SOLUTION INTRAMUSCULAR; INTRAVENOUS
Status: DISCONTINUED | OUTPATIENT
Start: 2022-01-05 | End: 2022-01-05 | Stop reason: HOSPADM

## 2022-01-05 RX ORDER — PROPOFOL 10 MG/ML
INJECTION, EMULSION INTRAVENOUS AS NEEDED
Status: DISCONTINUED | OUTPATIENT
Start: 2022-01-05 | End: 2022-01-05 | Stop reason: HOSPADM

## 2022-01-05 RX ORDER — MIDAZOLAM HYDROCHLORIDE 1 MG/ML
INJECTION, SOLUTION INTRAMUSCULAR; INTRAVENOUS AS NEEDED
Status: DISCONTINUED | OUTPATIENT
Start: 2022-01-05 | End: 2022-01-05 | Stop reason: HOSPADM

## 2022-01-05 RX ORDER — DEXAMETHASONE SODIUM PHOSPHATE 4 MG/ML
INJECTION, SOLUTION INTRA-ARTICULAR; INTRALESIONAL; INTRAMUSCULAR; INTRAVENOUS; SOFT TISSUE AS NEEDED
Status: DISCONTINUED | OUTPATIENT
Start: 2022-01-05 | End: 2022-01-05 | Stop reason: HOSPADM

## 2022-01-05 RX ORDER — KETOROLAC TROMETHAMINE 30 MG/ML
INJECTION, SOLUTION INTRAMUSCULAR; INTRAVENOUS AS NEEDED
Status: DISCONTINUED | OUTPATIENT
Start: 2022-01-05 | End: 2022-01-05 | Stop reason: HOSPADM

## 2022-01-05 RX ORDER — SODIUM CHLORIDE, SODIUM LACTATE, POTASSIUM CHLORIDE, CALCIUM CHLORIDE 600; 310; 30; 20 MG/100ML; MG/100ML; MG/100ML; MG/100ML
INJECTION, SOLUTION INTRAVENOUS
Status: DISCONTINUED | OUTPATIENT
Start: 2022-01-05 | End: 2022-01-05 | Stop reason: HOSPADM

## 2022-01-05 RX ORDER — DEXMEDETOMIDINE HYDROCHLORIDE 100 UG/ML
INJECTION, SOLUTION INTRAVENOUS AS NEEDED
Status: DISCONTINUED | OUTPATIENT
Start: 2022-01-05 | End: 2022-01-05 | Stop reason: HOSPADM

## 2022-01-05 RX ORDER — PHENYLEPHRINE HCL IN 0.9% NACL 0.4MG/10ML
SYRINGE (ML) INTRAVENOUS AS NEEDED
Status: DISCONTINUED | OUTPATIENT
Start: 2022-01-05 | End: 2022-01-05 | Stop reason: HOSPADM

## 2022-01-05 RX ORDER — MIDAZOLAM HYDROCHLORIDE 1 MG/ML
0.5 INJECTION, SOLUTION INTRAMUSCULAR; INTRAVENOUS
Status: DISCONTINUED | OUTPATIENT
Start: 2022-01-05 | End: 2022-01-05 | Stop reason: HOSPADM

## 2022-01-05 RX ORDER — MIDAZOLAM HYDROCHLORIDE 1 MG/ML
1 INJECTION, SOLUTION INTRAMUSCULAR; INTRAVENOUS AS NEEDED
Status: DISCONTINUED | OUTPATIENT
Start: 2022-01-05 | End: 2022-01-05 | Stop reason: HOSPADM

## 2022-01-05 RX ORDER — SODIUM CHLORIDE 9 MG/ML
25 INJECTION, SOLUTION INTRAVENOUS CONTINUOUS
Status: DISCONTINUED | OUTPATIENT
Start: 2022-01-05 | End: 2022-01-05 | Stop reason: HOSPADM

## 2022-01-05 RX ORDER — SODIUM CHLORIDE, SODIUM LACTATE, POTASSIUM CHLORIDE, CALCIUM CHLORIDE 600; 310; 30; 20 MG/100ML; MG/100ML; MG/100ML; MG/100ML
25 INJECTION, SOLUTION INTRAVENOUS CONTINUOUS
Status: DISCONTINUED | OUTPATIENT
Start: 2022-01-05 | End: 2022-01-05 | Stop reason: HOSPADM

## 2022-01-05 RX ORDER — ACETAMINOPHEN 325 MG/1
650 TABLET ORAL ONCE
Status: DISCONTINUED | OUTPATIENT
Start: 2022-01-05 | End: 2022-01-05 | Stop reason: HOSPADM

## 2022-01-05 RX ORDER — KETAMINE HYDROCHLORIDE 10 MG/ML
INJECTION, SOLUTION INTRAMUSCULAR; INTRAVENOUS AS NEEDED
Status: DISCONTINUED | OUTPATIENT
Start: 2022-01-05 | End: 2022-01-05 | Stop reason: HOSPADM

## 2022-01-05 RX ORDER — DIPHENHYDRAMINE HYDROCHLORIDE 50 MG/ML
12.5 INJECTION, SOLUTION INTRAMUSCULAR; INTRAVENOUS AS NEEDED
Status: DISCONTINUED | OUTPATIENT
Start: 2022-01-05 | End: 2022-01-05 | Stop reason: HOSPADM

## 2022-01-05 RX ORDER — GLYCOPYRROLATE 0.2 MG/ML
INJECTION INTRAMUSCULAR; INTRAVENOUS AS NEEDED
Status: DISCONTINUED | OUTPATIENT
Start: 2022-01-05 | End: 2022-01-05 | Stop reason: HOSPADM

## 2022-01-05 RX ORDER — SODIUM CHLORIDE 0.9 % (FLUSH) 0.9 %
5-40 SYRINGE (ML) INJECTION AS NEEDED
Status: DISCONTINUED | OUTPATIENT
Start: 2022-01-05 | End: 2022-01-05 | Stop reason: HOSPADM

## 2022-01-05 RX ORDER — ONDANSETRON 2 MG/ML
4 INJECTION INTRAMUSCULAR; INTRAVENOUS AS NEEDED
Status: DISCONTINUED | OUTPATIENT
Start: 2022-01-05 | End: 2022-01-05 | Stop reason: HOSPADM

## 2022-01-05 RX ORDER — FENTANYL CITRATE 50 UG/ML
50 INJECTION, SOLUTION INTRAMUSCULAR; INTRAVENOUS AS NEEDED
Status: DISCONTINUED | OUTPATIENT
Start: 2022-01-05 | End: 2022-01-05 | Stop reason: HOSPADM

## 2022-01-05 RX ORDER — LIDOCAINE HYDROCHLORIDE 10 MG/ML
0.1 INJECTION, SOLUTION EPIDURAL; INFILTRATION; INTRACAUDAL; PERINEURAL AS NEEDED
Status: DISCONTINUED | OUTPATIENT
Start: 2022-01-05 | End: 2022-01-05 | Stop reason: HOSPADM

## 2022-01-05 RX ORDER — SUCCINYLCHOLINE CHLORIDE 20 MG/ML
INJECTION INTRAMUSCULAR; INTRAVENOUS AS NEEDED
Status: DISCONTINUED | OUTPATIENT
Start: 2022-01-05 | End: 2022-01-05 | Stop reason: HOSPADM

## 2022-01-05 RX ORDER — HYDROMORPHONE HYDROCHLORIDE 2 MG/ML
INJECTION, SOLUTION INTRAMUSCULAR; INTRAVENOUS; SUBCUTANEOUS AS NEEDED
Status: DISCONTINUED | OUTPATIENT
Start: 2022-01-05 | End: 2022-01-05 | Stop reason: HOSPADM

## 2022-01-05 RX ORDER — HYDROMORPHONE HYDROCHLORIDE 1 MG/ML
0.2 INJECTION, SOLUTION INTRAMUSCULAR; INTRAVENOUS; SUBCUTANEOUS
Status: DISCONTINUED | OUTPATIENT
Start: 2022-01-05 | End: 2022-01-05 | Stop reason: HOSPADM

## 2022-01-05 RX ORDER — HYDROMORPHONE HYDROCHLORIDE 1 MG/ML
0.5 INJECTION, SOLUTION INTRAMUSCULAR; INTRAVENOUS; SUBCUTANEOUS
Status: DISCONTINUED | OUTPATIENT
Start: 2022-01-05 | End: 2022-01-09 | Stop reason: HOSPADM

## 2022-01-05 RX ORDER — CEFAZOLIN SODIUM 1 G/3ML
INJECTION, POWDER, FOR SOLUTION INTRAMUSCULAR; INTRAVENOUS AS NEEDED
Status: DISCONTINUED | OUTPATIENT
Start: 2022-01-05 | End: 2022-01-05 | Stop reason: HOSPADM

## 2022-01-05 RX ORDER — ROPIVACAINE HYDROCHLORIDE 5 MG/ML
30 INJECTION, SOLUTION EPIDURAL; INFILTRATION; PERINEURAL AS NEEDED
Status: DISCONTINUED | OUTPATIENT
Start: 2022-01-05 | End: 2022-01-05 | Stop reason: HOSPADM

## 2022-01-05 RX ORDER — FENTANYL CITRATE 50 UG/ML
INJECTION, SOLUTION INTRAMUSCULAR; INTRAVENOUS AS NEEDED
Status: DISCONTINUED | OUTPATIENT
Start: 2022-01-05 | End: 2022-01-05 | Stop reason: HOSPADM

## 2022-01-05 RX ORDER — ROCURONIUM BROMIDE 10 MG/ML
INJECTION, SOLUTION INTRAVENOUS AS NEEDED
Status: DISCONTINUED | OUTPATIENT
Start: 2022-01-05 | End: 2022-01-05 | Stop reason: HOSPADM

## 2022-01-05 RX ORDER — FENTANYL CITRATE 50 UG/ML
25 INJECTION, SOLUTION INTRAMUSCULAR; INTRAVENOUS
Status: DISCONTINUED | OUTPATIENT
Start: 2022-01-05 | End: 2022-01-05 | Stop reason: HOSPADM

## 2022-01-05 RX ORDER — EPHEDRINE SULFATE/0.9% NACL/PF 50 MG/5 ML
SYRINGE (ML) INTRAVENOUS AS NEEDED
Status: DISCONTINUED | OUTPATIENT
Start: 2022-01-05 | End: 2022-01-05 | Stop reason: HOSPADM

## 2022-01-05 RX ORDER — SODIUM CHLORIDE 0.9 % (FLUSH) 0.9 %
5-40 SYRINGE (ML) INJECTION EVERY 8 HOURS
Status: DISCONTINUED | OUTPATIENT
Start: 2022-01-05 | End: 2022-01-05 | Stop reason: HOSPADM

## 2022-01-05 RX ORDER — NEOSTIGMINE METHYLSULFATE 1 MG/ML
INJECTION INTRAVENOUS AS NEEDED
Status: DISCONTINUED | OUTPATIENT
Start: 2022-01-05 | End: 2022-01-05 | Stop reason: HOSPADM

## 2022-01-05 RX ORDER — BUPIVACAINE HYDROCHLORIDE 5 MG/ML
INJECTION, SOLUTION EPIDURAL; INTRACAUDAL AS NEEDED
Status: DISCONTINUED | OUTPATIENT
Start: 2022-01-05 | End: 2022-01-05 | Stop reason: HOSPADM

## 2022-01-05 RX ADMIN — SUCCINYLCHOLINE CHLORIDE 180 MG: 20 INJECTION, SOLUTION INTRAMUSCULAR; INTRAVENOUS at 12:10

## 2022-01-05 RX ADMIN — NEOSTIGMINE METHYLSULFATE 2 MG: 1 INJECTION, SOLUTION INTRAVENOUS at 13:11

## 2022-01-05 RX ADMIN — FENTANYL CITRATE 100 MCG: 50 INJECTION, SOLUTION INTRAMUSCULAR; INTRAVENOUS at 12:10

## 2022-01-05 RX ADMIN — DIPHENHYDRAMINE HYDROCHLORIDE 12.5 MG: 50 INJECTION, SOLUTION INTRAMUSCULAR; INTRAVENOUS at 22:29

## 2022-01-05 RX ADMIN — DEXMEDETOMIDINE HYDROCHLORIDE 8 MCG: 100 INJECTION, SOLUTION, CONCENTRATE INTRAVENOUS at 12:26

## 2022-01-05 RX ADMIN — CEFAZOLIN 2 G: 330 INJECTION, POWDER, FOR SOLUTION INTRAMUSCULAR; INTRAVENOUS at 12:22

## 2022-01-05 RX ADMIN — ONDANSETRON HYDROCHLORIDE 4 MG: 2 INJECTION, SOLUTION INTRAMUSCULAR; INTRAVENOUS at 12:10

## 2022-01-05 RX ADMIN — ROCURONIUM BROMIDE 15 MG: 10 SOLUTION INTRAVENOUS at 12:13

## 2022-01-05 RX ADMIN — SUCRALFATE 0.5 G: 1 TABLET ORAL at 18:10

## 2022-01-05 RX ADMIN — PANTOPRAZOLE SODIUM 40 MG: 40 INJECTION, POWDER, FOR SOLUTION INTRAVENOUS at 00:53

## 2022-01-05 RX ADMIN — MIDAZOLAM 2 MG: 1 INJECTION INTRAMUSCULAR; INTRAVENOUS at 12:05

## 2022-01-05 RX ADMIN — PHENYLEPHRINE HYDROCHLORIDE 80 MCG/MIN: 10 INJECTION INTRAVENOUS at 12:12

## 2022-01-05 RX ADMIN — SODIUM CHLORIDE, PRESERVATIVE FREE 10 ML: 5 INJECTION INTRAVENOUS at 15:15

## 2022-01-05 RX ADMIN — Medication 10 MG: at 12:52

## 2022-01-05 RX ADMIN — DEXMEDETOMIDINE HYDROCHLORIDE 8 MCG: 100 INJECTION, SOLUTION, CONCENTRATE INTRAVENOUS at 12:31

## 2022-01-05 RX ADMIN — SERTRALINE 50 MG: 50 TABLET, FILM COATED ORAL at 09:42

## 2022-01-05 RX ADMIN — SODIUM CHLORIDE, PRESERVATIVE FREE 10 ML: 5 INJECTION INTRAVENOUS at 22:31

## 2022-01-05 RX ADMIN — GLYCOPYRROLATE 0.4 MG: 0.2 INJECTION, SOLUTION INTRAMUSCULAR; INTRAVENOUS at 13:11

## 2022-01-05 RX ADMIN — DEXAMETHASONE SODIUM PHOSPHATE 4 MG: 4 INJECTION, SOLUTION INTRAMUSCULAR; INTRAVENOUS at 12:10

## 2022-01-05 RX ADMIN — HYDROMORPHONE HYDROCHLORIDE 0.5 MG: 2 INJECTION, SOLUTION INTRAMUSCULAR; INTRAVENOUS; SUBCUTANEOUS at 13:12

## 2022-01-05 RX ADMIN — PROPOFOL 150 MG: 10 INJECTION, EMULSION INTRAVENOUS at 12:10

## 2022-01-05 RX ADMIN — SODIUM CHLORIDE, POTASSIUM CHLORIDE, SODIUM LACTATE AND CALCIUM CHLORIDE: 600; 310; 30; 20 INJECTION, SOLUTION INTRAVENOUS at 12:03

## 2022-01-05 RX ADMIN — SUCRALFATE 0.5 G: 1 TABLET ORAL at 15:15

## 2022-01-05 RX ADMIN — ROCURONIUM BROMIDE 5 MG: 10 SOLUTION INTRAVENOUS at 12:10

## 2022-01-05 RX ADMIN — Medication 30 MG: at 12:33

## 2022-01-05 RX ADMIN — Medication 10 MG: at 12:12

## 2022-01-05 RX ADMIN — PANTOPRAZOLE SODIUM 40 MG: 40 INJECTION, POWDER, FOR SOLUTION INTRAVENOUS at 15:16

## 2022-01-05 RX ADMIN — HYDROMORPHONE HYDROCHLORIDE 0.5 MG: 1 INJECTION, SOLUTION INTRAMUSCULAR; INTRAVENOUS; SUBCUTANEOUS at 19:13

## 2022-01-05 RX ADMIN — LIDOCAINE HYDROCHLORIDE 80 MG: 20 INJECTION, SOLUTION EPIDURAL; INFILTRATION; INTRACAUDAL; PERINEURAL at 12:10

## 2022-01-05 RX ADMIN — KETOROLAC TROMETHAMINE 15 MG: 30 INJECTION, SOLUTION INTRAMUSCULAR; INTRAVENOUS at 13:10

## 2022-01-05 RX ADMIN — Medication 10 ML: at 15:16

## 2022-01-05 RX ADMIN — Medication 80 MCG: at 13:01

## 2022-01-05 RX ADMIN — DEXMEDETOMIDINE HYDROCHLORIDE 4 MCG: 100 INJECTION, SOLUTION, CONCENTRATE INTRAVENOUS at 12:37

## 2022-01-05 RX ADMIN — SODIUM CHLORIDE, POTASSIUM CHLORIDE, SODIUM LACTATE AND CALCIUM CHLORIDE 100 ML/HR: 600; 310; 30; 20 INJECTION, SOLUTION INTRAVENOUS at 09:42

## 2022-01-05 RX ADMIN — Medication 80 MCG: at 12:12

## 2022-01-05 NOTE — ANESTHESIA POSTPROCEDURE EVALUATION
Post-Anesthesia Evaluation and Assessment    Patient: Ralph Edmond MRN: [id number]  SSN: [id number]    YOB: 1980  Age: 39 y.o. Sex: female      I have evaluated the patient and they are stable and ready for discharge from the PACU. Cardiovascular Function/Vital Signs  Visit Vitals  BP (!) 115/56   Pulse 67   Temp 36.4 °C (97.6 °F)   Resp 10   SpO2 100%       Patient is status post General anesthesia for Procedure(s):  LAPAROSCOPIC GASTROSTOMY WITH FEEDING TUBE PLACEMENT (URGENT). Nausea/Vomiting: None    Postoperative hydration reviewed and adequate. Pain:  Pain Scale 1: Numeric (0 - 10) (01/05/22 1356)  Pain Intensity 1: 0 (01/05/22 1356)   Managed    Neurological Status:   Neuro (WDL): Within Defined Limits (01/05/22 1356)  Neuro  Neurologic State: Sleeping (01/05/22 1356)  LUE Motor Response: Purposeful (01/05/22 1342)  LLE Motor Response: Purposeful (01/05/22 1342)  RUE Motor Response: Purposeful (01/05/22 1342)  RLE Motor Response: Purposeful (01/05/22 1342)   At baseline    Mental Status, Level of Consciousness: Alert and  oriented to person, place, and time    Pulmonary Status:   O2 Device: None (Room air) (01/05/22 1359)   Adequate oxygenation and airway patent    Complications related to anesthesia: None    Post-anesthesia assessment completed. No concerns    Signed By: Yamilet Acosta MD     January 5, 2022              Procedure(s):  LAPAROSCOPIC GASTROSTOMY WITH FEEDING TUBE PLACEMENT (URGENT). general    <BSHSIANPOST>    INITIAL Post-op Vital signs:   Vitals Value Taken Time   /56 01/05/22 1414   Temp 36.4 °C (97.6 °F) 01/05/22 1356   Pulse 60 01/05/22 1424   Resp 11 01/05/22 1424   SpO2 100 % 01/05/22 1424   Vitals shown include unvalidated device data.

## 2022-01-05 NOTE — PERIOP NOTES
TRANSFER - OUT REPORT:    Verbal report given to CC RN on Liliana Garidner  being transferred to 4 for routine post - op       Report consisted of patients Situation, Background, Assessment and   Recommendations(SBAR). Time Pre op antibiotic given:1222  Anesthesia Stop time: 1363  Theodore Present on Transfer to floor:no  Order for Theodore on Chart:no  Discharge Prescriptions with Chart:no    Information from the following report(s) SBAR, OR Summary, Intake/Output and MAR was reviewed with the receiving nurse. Opportunity for questions and clarification was provided. Is the patient on 02? NO       L/Min -       Other -    Is the patient on a monitor? NO    Is the nurse transporting with the patient? YES    Surgical Waiting Area notified of patient's transfer from PACU?  YES      The following personal items collected during your admission accompanied patient upon transfer:   Dental Appliance:    Vision:    Hearing Aid:    Jewelry:    Clothing:    Other Valuables:    Valuables sent to safe:

## 2022-01-05 NOTE — ANESTHESIA PREPROCEDURE EVALUATION
Relevant Problems   HEMATOLOGY   (+) Microcytic anemia       Anesthetic History   No history of anesthetic complications            Review of Systems / Medical History  Patient summary reviewed, nursing notes reviewed and pertinent labs reviewed    Pulmonary  Within defined limits                 Neuro/Psych   Within defined limits           Cardiovascular  Within defined limits                Exercise tolerance: >4 METS     GI/Hepatic/Renal     GERD: well controlled          Comments: S/p PGB in 2004  abd pain, vomiting Endo/Other  Within defined limits      Anemia     Other Findings   Comments: Anemia     GERD (gastroesophageal reflux disease)    Hepatitis- treated  Severe malnourishment  COVID+           Physical Exam    Airway  Mallampati: II  TM Distance: > 6 cm  Neck ROM: normal range of motion   Mouth opening: Normal     Cardiovascular  Regular rate and rhythm,  S1 and S2 normal,  no murmur, click, rub, or gallop             Dental  No notable dental hx       Pulmonary  Breath sounds clear to auscultation               Abdominal  GI exam deferred       Other Findings            Anesthetic Plan    ASA: 3  Anesthesia type: general          Induction: RSI  Anesthetic plan and risks discussed with: Patient

## 2022-01-05 NOTE — PROGRESS NOTES
Transition of Care Plan   RUR: obs   Disposition: The disposition plan is home with family assistance   F/U with PCP/Specialist    Transport: family    Reason for Admission:  GJU                     RUR Score:  obs                   Plan for utilizing home health:    Not recommended       PCP: First and Last name:  None     Name of Practice:    Are you a current patient: Yes/No:    Approximate date of last visit:    Can you participate in a virtual visit with your PCP:                     Current Advanced Directive/Advance Care Plan: Full Code      Healthcare Decision Maker:                     Transition of Care Plan:                      CRM spoke with patient, introduced self, explained role, verified demographics, and offered assistance. The patient lives with her parents and children in a home with no steps to access through the garage. The patient is independent in her ADL's/IADL's and does not have any DME. The patient [facility] [place]. Care Management Interventions  PCP Verified by CM: No (no PCP)  Palliative Care Criteria Met (RRAT>21 & CHF Dx)?: No  Mode of Transport at Discharge:  Other (see comment) (family)  Transition of Care Consult (CM Consult): Discharge Planning  MyChart Signup: No  Discharge Durable Medical Equipment: No  Health Maintenance Reviewed: Yes  Physical Therapy Consult: No  Occupational Therapy Consult: No  Speech Therapy Consult: No  Support Systems: Parent(s),Child(madeline)  Confirm Follow Up Transport: Family  [employer] Information Provided?: No  Discharge Location  Discharge Placement: Home with family assistance    BOY Jean

## 2022-01-05 NOTE — BRIEF OP NOTE
Brief Postoperative Note    Patient: Dennis Higgins  YOB: 1980  MRN: [id number]    Date of Procedure: 1/5/2022     Pre-Op Diagnosis: SEVERE PROTEIN CALORIE MALNUTRITION    Post-Op Diagnosis: Same as preoperative diagnosis. Procedure(s):  LAPAROSCOPIC GASTROSTOMY WITH FEEDING TUBE PLACEMENT (URGENT)    Surgeon(s):  Rizwana Barnes MD    Surgical Assistant: Physician Assistant: MARYLOU Eddy    Anesthesia: General     Estimated Blood Loss (mL): Minimal    Complications: None    Specimens:   ID Type Source Tests Collected by Time Destination   1 :     Rizwana Barnes MD 1/5/2022 1306 Pathology        Implants: * No implants in log *    Drains:   Feeding Tube 01/05/22 (Active)   Site Assessment Clean, dry, & intact 01/05/22 1319   Dressing Type 4 X 4 01/05/22 1319   Tube Status In place 01/05/22 1244       Findings: Placement of 21 [place] JOSE gastrostomy into body of excluded stomach.     Electronically Signed by Nadeem Silva MD on 1/5/2022 at 1:26 PM

## 2022-01-05 NOTE — PROGRESS NOTES
TRANSFER - OUT REPORT:    Verbal report given to Hawa (name) on Wally Buckley  being transferred to [facility] (unit) for routine progression of care       Report consisted of patients Situation, Background, Assessment and   Recommendations(SBAR). Information from the following report(s) SBAR, Kardex, [facility] and Cardiac Rhythm NSR was reviewed with the receiving nurse. Lines:   Peripheral IV 01/02/22 Left Antecubital (Active)   Site Assessment Clean, dry, & intact 01/05/22 0000   Phlebitis Assessment 0 01/02/22 2219   Infiltration Assessment 0 01/02/22 2219   Dressing Status Clean, dry, & intact 01/02/22 2219   Dressing Type Transparent 01/02/22 2219   Hub Color/Line Status Pink;Flushed;Patent 01/02/22 2219   Action Taken Blood drawn 01/02/22 2219        Opportunity for questions and clarification was provided. Patient transported with:   Monitor  Registered Nurse   Patient belongings.

## 2022-01-06 PROCEDURE — 96375 TX/PRO/DX INJ NEW DRUG ADDON: CPT

## 2022-01-06 PROCEDURE — 74011250636 HC RX REV CODE- 250/636: Performed by: SURGERY

## 2022-01-06 PROCEDURE — 74011000250 HC RX REV CODE- 250: Performed by: SURGERY

## 2022-01-06 PROCEDURE — G0378 HOSPITAL OBSERVATION PER HR: HCPCS

## 2022-01-06 PROCEDURE — 74011250637 HC RX REV CODE- 250/637: Performed by: SURGERY

## 2022-01-06 PROCEDURE — C9113 INJ PANTOPRAZOLE SODIUM, VIA: HCPCS | Performed by: SURGERY

## 2022-01-06 PROCEDURE — 96376 TX/PRO/DX INJ SAME DRUG ADON: CPT

## 2022-01-06 RX ORDER — MORPHINE SULFATE 2 MG/ML
2 INJECTION, SOLUTION INTRAMUSCULAR; INTRAVENOUS
Status: DISCONTINUED | OUTPATIENT
Start: 2022-01-06 | End: 2022-01-09 | Stop reason: HOSPADM

## 2022-01-06 RX ADMIN — HYDROMORPHONE HYDROCHLORIDE 0.5 MG: 1 INJECTION, SOLUTION INTRAMUSCULAR; INTRAVENOUS; SUBCUTANEOUS at 06:18

## 2022-01-06 RX ADMIN — HYDROMORPHONE HYDROCHLORIDE 0.5 MG: 1 INJECTION, SOLUTION INTRAMUSCULAR; INTRAVENOUS; SUBCUTANEOUS at 11:01

## 2022-01-06 RX ADMIN — ONDANSETRON HYDROCHLORIDE 4 MG: 2 SOLUTION INTRAMUSCULAR; INTRAVENOUS at 20:36

## 2022-01-06 RX ADMIN — SERTRALINE 50 MG: 50 TABLET, FILM COATED ORAL at 08:36

## 2022-01-06 RX ADMIN — SODIUM CHLORIDE, POTASSIUM CHLORIDE, SODIUM LACTATE AND CALCIUM CHLORIDE 100 ML/HR: 600; 310; 30; 20 INJECTION, SOLUTION INTRAVENOUS at 00:37

## 2022-01-06 RX ADMIN — Medication 10 ML: at 13:04

## 2022-01-06 RX ADMIN — SODIUM CHLORIDE, PRESERVATIVE FREE 10 ML: 5 INJECTION INTRAVENOUS at 13:05

## 2022-01-06 RX ADMIN — PANTOPRAZOLE SODIUM 40 MG: 40 INJECTION, POWDER, FOR SOLUTION INTRAVENOUS at 13:05

## 2022-01-06 RX ADMIN — HYDROMORPHONE HYDROCHLORIDE 0.5 MG: 1 INJECTION, SOLUTION INTRAMUSCULAR; INTRAVENOUS; SUBCUTANEOUS at 20:37

## 2022-01-06 RX ADMIN — PANTOPRAZOLE SODIUM 40 MG: 40 INJECTION, POWDER, FOR SOLUTION INTRAVENOUS at 00:38

## 2022-01-06 RX ADMIN — HYDROMORPHONE HYDROCHLORIDE 0.5 MG: 1 INJECTION, SOLUTION INTRAMUSCULAR; INTRAVENOUS; SUBCUTANEOUS at 00:37

## 2022-01-06 RX ADMIN — SODIUM CHLORIDE, POTASSIUM CHLORIDE, SODIUM LACTATE AND CALCIUM CHLORIDE 100 ML/HR: 600; 310; 30; 20 INJECTION, SOLUTION INTRAVENOUS at 11:01

## 2022-01-06 NOTE — OP NOTES
[facility]  OPERATIVE REPORT    Name:  Rogelio Quinn  MR#:  [id number]  :  1980  ACCOUNT #:  [de-identified]  DATE OF SERVICE:  2022    PREOPERATIVE DIAGNOSES:  1. Severe chronic protein malnutrition. 2.  Chronic marginal ulcer with stricture. POSTOPERATIVE DIAGNOSES:  1. Severe chronic protein malnutrition. 2.  Chronic marginal ulcer with stricture. PROCEDURE PERFORMED:  Laparoscopic gastrostomy insertion (CPT [id number]). SURGEON:  Nader Davila MD    ASSISTANT:  MARYLOU Winslow    ANESTHESIA:  General endotracheal.    COMPLICATIONS:  None. SPECIMENS REMOVED:  None. IMPLANTS:  20-Uzbek JOSE gastrostomy tube. ESTIMATED BLOOD LOSS:  Minimal.    DRAINS:  None. COUNTS:  Sponge count correct. Needle count correct. INDICATIONS:  The patient is a 59-year-old -American female with a history of morbid obesity, managed through laparoscopic gastric bypass in . Her postoperative course was complicated by stricture, enteric perforation requiring laparotomy, repair versus resection (patient unsure, records unavailable). While she has done well from a weight loss standpoint, she has developed progressive dysphagia, nausea, and vomiting. Earlier this year, she was diagnosed with a marginal ulcer, and an attempt at medical management was initiated. She had some signs of healing, with some improvement in tolerance of diet, but has recently undergone recurrent worsening of dysphagia, vomiting, now with hematemesis, necessitating her recent readmission. Most recent upper gastrointestinal series revealed severe stricture at anastomosis with proximal pouch dilation. Hemoglobin has stabilized, no signs of active bleeding. She has signs of severe protein, calorie malnutrition, with a serum prealbumin of 18.5. She cannot tolerate cessation of oral intake to support her nutrition.   She has been scheduled for laparoscopic resection and reconstruction of the gastrojejunostomy with truncal vagotomy for later this month. Tube feeds must be necessary as her sole source of nutrition, and she is taken to the operating room today for laparoscopic gastrostomy insertion into her excluded stomach. I have asked MARYLOU Turner, to assist with the procedure given the technical complexity of re-operative laparoscopic bariatric surgery. She will run the laparoscope, assist in dissection and delineation of the anatomy, and assist in insertion of the feeding tube into the excluded stomach. FINDINGS:  Laparoscopic placement of 20-Korean JOSE gastrostomy into the body of the excluded stomach. PROCEDURE:  The patient was identified in her room and then transferred directly to the operating room given COVID-19 status. She was transferred to the operating room table. All potential pressure points were padded with eggcrate. Following the uneventful initiation of general anesthesia, she was carefully secured to the operating room table. Her abdomen was prepped and draped in the usual sterile fashion. Final time-out was performed, and it was confirmed she had received intravenous antibiotics. A 5-mm trocar was inserted through a small right upper quadrant skin incision using an Optiview technique. After confirming intraperitoneal location of the trocar tip, insufflation with carbon dioxide gas was initiated. Once adequate working sites had been developed, a 5-mm, 30-degree laparoscope was inserted. No signs of trocar injury were present. Omental adhesions, tethering the transverse colon, were present in the area of the falciform ligament. A 5-mm supraumbilical trocar was inserted through a small incision using visual guidance with the laparoscope. A left subcostal 5-mm trocar was inserted using identical technique. A right upper quadrant 12-mm trocar was inserted using visual guidance with the laparoscope.   Lysis of adhesions was initiated, freeing the omentum from the anterior abdominal wall in the area of the falciform ligament, allowing the transverse colon to fall posteriorly, with exposure of the antecolic, antegastric Dinah limb. Adhesions between the Dinah limb, its blood supply, and surrounding structures were taken down using the combination of blunt dissection and the Harmonic scalpel. This exposed the body of the stomach medial to the Dinah limb, several centimeters proximal to the pylorus. This site was chosen as the most accessible for planned gastrostomy. After confirming this segment of stomach would reach the anterior abdominal wall without undue tension, a 2-0 Polysorb pursestring suture was placed. Harmonic scalpel was used to make an opening in the stomach at the center of this pursestring suture, and a [place] was used to enter the lumen of the excluded stomach. A 20-Georgian JOSE gastrostomy tube was passed through a small stab incision in the epigastrium, and passed into the lumen of the excluded stomach. The balloon was used to secure the tube in the lumen of the excluded stomach. The pursestring suture was then tied down. Alice technique was then used to secure the excluded stomach to the anterior abdominal wall using multiple transfascial 0 Surgidac sutures using a laparoscopic suture passer. These sutures were then used to secure the excluded stomach to the anterior abdominal wall as they were tied down. A Jenny syringe was used to flush the gastrostomy tube, with easy passage of saline through the tube, into the stomach, with observed passage of saline into the duodenum. After confirming adequate hemostasis, the 12-mm fascial defect was closed with a 0 Vicryl suture using a laparoscopic suture passer. Pneumoperitoneum was released, and all trocars were removed. All wounds were infiltrated with 0.5% Marcaine without epinephrine. All skin edges were reapproximated with a combination of subcuticular 4-0 Monocryl suture and Dermabond. The patient tolerated the procedure well. She was extubated in the operating room and transported to the recovery area in stable condition. The attending surgeon, Dr. Nahid Amaya, was scrubbed and present for the entire procedure.         Swathi Campbell MD      [id number]  D:  01/05/2022 20:43  T:  01/06/2022 3:13  JOB #:  [id number]

## 2022-01-06 NOTE — PROGRESS NOTES
Progress Note    Patient: Wally Buckley MRN: [id number]  SSN: [id number]    YOB: 1980  Age: 39 y.o. Sex: female      Admit Date: 2022    1 Day Post-Op    Procedure:  Procedure(s):  LAPAROSCOPIC GASTROSTOMY WITH FEEDING TUBE PLACEMENT (URGENT)    Subjective:     Patient complains of incisional pain; she has been out of bed, walking around the room. She is tolerating a small amount of soft food. Bolus tube feeds have not yet been started. Objective:     Visit Vitals  BP (!) 101/59   Pulse 69   Temp 98.1 °F (36.7 °C)   Resp 16   Ht 5' 5\" (1.651 m)   SpO2 98%   BMI 24.79 kg/m²       Temp (24hrs), Av.9 °F (36.6 °C), Min:97.5 °F (36.4 °C), Max:98.1 °F (36.7 °C)      Physical Exam:    ABDOMEN: Nondistended, soft. Laparoscopic wounds dry and intact. Left upper quadrant gastrostomy site clear. Appropriate incisional pain with palpation. Data Review: VS, I/O's    Assessment:     Hospital Problems  Date Reviewed: 2022          Codes Class Noted POA    Upper gastrointestinal hemorrhage ICD-10-CM: K92.2  ICD-9-CM: 578.9  2022 Yes        Severe protein-calorie malnutrition ([facility] [address] ICD-10-CM: E43  ICD-9-CM: 593  2022 Yes        * (Principal) GJU (gastrojejunal ulcer) ICD-10-CM: K28.9  ICD-9-CM: 534.90  1/3/2022 Yes            She has severe protein calorie malnutrition secondary to chronic marginal ulcer, nonhealing, with stricture, precluding any meaningful oral intake. Ultimately she will need revision of her anastomosis to allow her to tolerate solid food. She will need tube feeds as her sole source of nutrition for at least 90 days, until protein stores are replete, allowing revisional surgery. Plan/Recommendations/Medical Decision Making:     Start bolus tube feeds, assess tolerance. Oral analgesics, antiemetics as needed. Out of bed in chair, ambulation. Continue Protonix, sucralfate, soft foods as tolerated.   Discharge planning-anticipate discharge to home tomorrow on bolus tube feeds.

## 2022-01-06 NOTE — CONSULTS
Comprehensive Nutrition Assessment    Type and Reason for Visit: Initial,Consult    Nutrition Recommendations/Plan:     1. New Gtube feeding orders:   --- 2calHN  170 ml x 5 feedings/day   --- Give 110 ml water flush before AND after each bolus   --- Give 1 packet of Liquid Prosource via Gtube daily    2. The above provides total of: 1760 kcals, 86 gm pro, 1700 ml of free fluid    3. Continue with daily bariatric multivitamins/mineral supplements      Nutrition Assessment:  Pt's history:  68-year-old -American female with a history of morbid obesity, managed through laparoscopic gastric bypass in 2005. Her postoperative course was complicated by stricture, enteric perforation requiring laparotomy, repair versus resection (patient unsure, records unavailable). While she has done well from a weight loss standpoint, she has developed progressive dysphagia, nausea, and vomiting. Earlier this year, she was diagnosed with a marginal ulcer, and an attempt at medical management was initiated. She had some signs of healing, with some improvement in tolerance of diet, but has recently undergone recurrent worsening of dysphagia, vomiting, now with hematemesis, necessitating her recent readmission. Most recent upper gastrointestinal series revealed severe stricture at anastomosis with proximal pouch dilation. Hemoglobin has stabilized, no signs of active bleeding. She has signs of severe protein, calorie malnutrition, with a serum prealbumin of 18.5. She cannot tolerate cessation of oral intake to support her nutrition    Pt is covid + so did not enter pt's room, but did speak with pt's nurse. I also discussed tube feeding plan with Dr. Ritchie Craft via [employer] this morning. Pt had new Gtube placed in her remnant stomach, pt has chronic marginal ulcer with stricture, tube feedings will be her main source of nutrition for an extended period of time.    I have adjusted tube feeding order to reflect pt's estimated nutritional needs w/o over-feeding her. Please see above recommendations for tube feeding order. Per MD, if pt tolerates her Gtube feeds she will d/c home later today. RD will follow up with pt if she remains an inpatient. Thank you very much for the consult. Malnutrition Assessment:  Malnutrition Status:  Severe malnutrition    Context:  Acute illness     Findings of the 6 clinical characteristics of malnutrition:   Energy Intake:  7 - 50% or less of est energy requirements for 5 or more days  Weight Loss:  7 - Greater than 2% over 1 week     Body Fat Loss:  1 - Mild body fat loss,     Muscle Mass Loss:  1 - Mild muscle mass loss,    Fluid Accumulation:   ,     Strength:  Not performed         Estimated Daily Nutrient Needs:  Energy (kcal): ~ 6880-2917 kcals (MSJ x 1.3-1.4); Weight Used for Energy Requirements: Current  Protein (g): 1.2-1.4 gm pro/kg; Weight Used for Protein Requirements: Current  Fluid (ml/day): 1 ml/kcal; Method Used for Fluid Requirements: 1 ml/kcal      Nutrition Related Findings:         Wounds:    Surgical incision       Current Nutrition Therapies:  ADULT DIET Dysphagia - Soft & Bite Sized  ADULT ORAL NUTRITION SUPPLEMENT PM Snack, HS Snack; Low Calorie/High Protein  ADULT TUBE FEEDING Gastrostomy; 2.0 Calorie; Delivery Method: Bolus; Bolus Frequency: 5 Times Daily; Bolus Volume (mL): 170; Bolus Method of Infusion: Syringe Push; Water Flush Volume (mL): 110; Water Flush Frequency: Before and after each bolus; . ..     Anthropometric Measures:  · Height:  5' 5\" (165.1 cm)  · Current Body Wt:  67.6 kg (149 lb 0.5 oz)   · Admission Body Wt:  149 lb 0.5 oz    · Usual Body Wt:        · Ideal Body Wt:  125 lbs:  119.2 %     Wt Readings from Last 20 Encounters:   11/15/21 67.6 kg (149 lb)   10/25/21 67.6 kg (149 lb)   10/21/21 68 kg (150 lb)   09/27/21 69.4 kg (153 lb)   06/07/21 74.4 kg (164 lb)   06/03/21 74.8 kg (164 lb 12.8 oz)   05/10/21 74.8 kg (165 lb)   04/26/21 74.8 kg (165 lb)   04/07/21 74 kg (163 lb 2.3 oz)   04/07/21 73.9 kg (163 lb)   04/03/17 77.4 kg (170 lb 9.6 oz)   09/12/14 84 kg (185 lb 3 oz)   08/21/14 85.5 kg (188 lb 9.6 oz)   04/09/14 76.7 kg (169 lb)   10/17/12 81.2 kg (179 lb)   10/12/12 79.4 kg (175 lb)        Nutrition Diagnosis:   · Inadequate oral intake related to altered GI function as evidenced by poor intake prior to admission,nutrition support-enteral nutrition      Nutrition Interventions:   Food and/or Nutrient Delivery: Continue current diet,Modify tube feeding  Nutrition Education and Counseling: No recommendations at this time  Coordination of Nutrition Care: Continue to monitor while inpatient,Coordination of community care    Goals:   Tolerance of goal Gtube feeds over the next 1-3 days       Nutrition Monitoring and Evaluation:   Behavioral-Environmental Outcomes: None identified  Food/Nutrient Intake Outcomes: Food and nutrient intake,Enteral nutrition intake/tolerance  Physical Signs/Symptoms Outcomes: GI status,Weight,Biochemical data    Discharge Planning:    Continue current diet,Enteral nutrition     Electronically signed by Vishal Chavez RD, CSP on 1/6/2022 at 10:15 AM    Contact: via [employer]

## 2022-01-06 NOTE — PROGRESS NOTES
JOSE: anticipate d/c home with [facility] for Tube feedings via Peg tube placed on 1/5; Nurses to provide tube feeding education prior to d/c; Family transport      Pt is Covid Positive    RUR: n/a    1100-CM reviewed pt chart and was approached by Attending advising that he intends to d/c pt tomorrow. Awaiting responses from [address] [place] [address]. CM to follow. 1500-CM spoke with [facility] Demetria De Dios [phone] who advised pt is covered at 80% until she meets her $400 out of pocket. CM spoke with pt at [phone] about the out of pocket cost and pt stated she has Medicaid and cannot afford out of pocket cost. Pt stated she has never been issued a Medicaid card and usually providers just look it up. CM contacted Dr. Navjot Rodriguez office and confirmed pt does not have active Medicaid. CM contacted Damaris Romero and relayed this information, as perhaps pt could do a payment plan or may meet her out of pocket deductible after hospitalization. CM to follow.   Telma Balderrama RN BSN CCM

## 2022-01-07 PROCEDURE — G0378 HOSPITAL OBSERVATION PER HR: HCPCS

## 2022-01-07 PROCEDURE — 99024 POSTOP FOLLOW-UP VISIT: CPT | Performed by: SURGERY

## 2022-01-07 PROCEDURE — 74011250636 HC RX REV CODE- 250/636: Performed by: SURGERY

## 2022-01-07 PROCEDURE — 96376 TX/PRO/DX INJ SAME DRUG ADON: CPT

## 2022-01-07 PROCEDURE — 74011000250 HC RX REV CODE- 250: Performed by: SURGERY

## 2022-01-07 PROCEDURE — C9113 INJ PANTOPRAZOLE SODIUM, VIA: HCPCS | Performed by: SURGERY

## 2022-01-07 PROCEDURE — 74011250637 HC RX REV CODE- 250/637: Performed by: SURGERY

## 2022-01-07 RX ORDER — SERTRALINE HYDROCHLORIDE 50 MG/1
50 TABLET, FILM COATED ORAL DAILY
Qty: 30 TABLET | Refills: 0 | Status: SHIPPED | OUTPATIENT
Start: 2022-01-08 | End: 2022-01-18

## 2022-01-07 RX ORDER — PANTOPRAZOLE SODIUM 40 MG/1
40 TABLET, DELAYED RELEASE ORAL
Status: DISCONTINUED | OUTPATIENT
Start: 2022-01-07 | End: 2022-01-09 | Stop reason: HOSPADM

## 2022-01-07 RX ORDER — OXYCODONE AND ACETAMINOPHEN 5; 325 MG/1; MG/1
1 TABLET ORAL
Qty: 25 TABLET | Refills: 0 | Status: SHIPPED | OUTPATIENT
Start: 2022-01-07 | End: 2022-01-12 | Stop reason: ALTCHOICE

## 2022-01-07 RX ADMIN — HYDROMORPHONE HYDROCHLORIDE 0.5 MG: 1 INJECTION, SOLUTION INTRAMUSCULAR; INTRAVENOUS; SUBCUTANEOUS at 07:36

## 2022-01-07 RX ADMIN — ONDANSETRON HYDROCHLORIDE 4 MG: 2 SOLUTION INTRAMUSCULAR; INTRAVENOUS at 02:15

## 2022-01-07 RX ADMIN — SODIUM CHLORIDE, POTASSIUM CHLORIDE, SODIUM LACTATE AND CALCIUM CHLORIDE 100 ML/HR: 600; 310; 30; 20 INJECTION, SOLUTION INTRAVENOUS at 21:05

## 2022-01-07 RX ADMIN — Medication 10 ML: at 02:14

## 2022-01-07 RX ADMIN — PANTOPRAZOLE SODIUM 40 MG: 40 INJECTION, POWDER, FOR SOLUTION INTRAVENOUS at 12:55

## 2022-01-07 RX ADMIN — PANTOPRAZOLE SODIUM 40 MG: 40 TABLET, DELAYED RELEASE ORAL at 17:01

## 2022-01-07 RX ADMIN — HYDROMORPHONE HYDROCHLORIDE 0.5 MG: 1 INJECTION, SOLUTION INTRAMUSCULAR; INTRAVENOUS; SUBCUTANEOUS at 02:29

## 2022-01-07 RX ADMIN — Medication 10 ML: at 13:00

## 2022-01-07 RX ADMIN — HYDROMORPHONE HYDROCHLORIDE 0.5 MG: 1 INJECTION, SOLUTION INTRAMUSCULAR; INTRAVENOUS; SUBCUTANEOUS at 21:04

## 2022-01-07 RX ADMIN — PANTOPRAZOLE SODIUM 40 MG: 40 INJECTION, POWDER, FOR SOLUTION INTRAVENOUS at 02:14

## 2022-01-07 RX ADMIN — SODIUM CHLORIDE, POTASSIUM CHLORIDE, SODIUM LACTATE AND CALCIUM CHLORIDE 100 ML/HR: 600; 310; 30; 20 INJECTION, SOLUTION INTRAVENOUS at 09:29

## 2022-01-07 RX ADMIN — HYDROMORPHONE HYDROCHLORIDE 0.5 MG: 1 INJECTION, SOLUTION INTRAMUSCULAR; INTRAVENOUS; SUBCUTANEOUS at 17:16

## 2022-01-07 RX ADMIN — DIPHENHYDRAMINE HYDROCHLORIDE 12.5 MG: 50 INJECTION, SOLUTION INTRAMUSCULAR; INTRAVENOUS at 02:15

## 2022-01-07 RX ADMIN — SERTRALINE 50 MG: 50 TABLET, FILM COATED ORAL at 09:31

## 2022-01-07 RX ADMIN — SODIUM CHLORIDE, PRESERVATIVE FREE 10 ML: 5 INJECTION INTRAVENOUS at 21:04

## 2022-01-07 NOTE — PROGRESS NOTES
Pt offered her snack (Ensure) and pt refused stating that she does not like them. Pt educated on the importance of her intake. Also began education on how to set up tube feeding for continuous feeds and adjusting flushes. Pt also educated on the type of feeding that she has been prescribed.

## 2022-01-07 NOTE — PROGRESS NOTES
Problem: Airway Clearance - Ineffective  Goal: Achieve or maintain patent airway  Outcome: Progressing Towards Goal     Problem: Gas Exchange - Impaired  Goal: Absence of hypoxia  Outcome: Progressing Towards Goal  Goal: Promote optimal lung function  Outcome: Progressing Towards Goal     Problem: Breathing Pattern - Ineffective  Goal: Ability to achieve and maintain a regular respiratory rate  Outcome: Progressing Towards Goal     Problem:  Body Temperature -  Risk of, Imbalanced  Goal: Ability to maintain a body temperature within defined limits  Outcome: Progressing Towards Goal  Goal: Will regain or maintain usual level of consciousness  Outcome: Progressing Towards Goal  Goal: Complications related to the disease process, condition or treatment will be avoided or minimized  Outcome: Progressing Towards Goal     Problem: Isolation Precautions - Risk of Spread of Infection  Goal: Prevent transmission of infectious organism to others  Outcome: Progressing Towards Goal     Problem: Nutrition Deficits  Goal: Optimize nutrtional status  Outcome: Progressing Towards Goal     Problem: Risk for Fluid Volume Deficit  Goal: Maintain normal heart rhythm  Outcome: Progressing Towards Goal  Goal: Maintain absence of muscle cramping  Outcome: Progressing Towards Goal  Goal: Maintain normal serum potassium, sodium, calcium, phosphorus, and pH  Outcome: Progressing Towards Goal     Problem: Loneliness or Risk for Loneliness  Goal: Demonstrate positive use of time alone when socialization is not possible  Outcome: Progressing Towards Goal     Problem: Fatigue  Goal: Verbalize increase energy and improved vitality  Outcome: Progressing Towards Goal     Problem: Patient Education: Go to Patient Education Activity  Goal: Patient/Family Education  Outcome: Progressing Towards Goal     Problem: Discharge Planning  Goal: *Discharge to safe environment  Outcome: Progressing Towards Goal     Problem: Nutrition Deficit  Goal: *Optimize nutritional status  Outcome: Progressing Towards Goal

## 2022-01-07 NOTE — PROGRESS NOTES
Patient complaining of stomach pain when tube feeding was started and also nauseated. Feeding was held as requested by the patient. She was educated to call the nurse when appropriate to resume feeding.

## 2022-01-07 NOTE — PROGRESS NOTES
JOSE: anticipate d/c home with [facility] for Tube feedings via Peg tube placed on 1/5; Plan for continuous tube feedings due to intolerance of bolus tube feedings, new dietician's recommendations uploaded for [facility] in all scripts/careport; Family transport    RUR: n/a    -0900-KAL received a phone call from Manisha Hammond of [facility] [phone] advising that pt informed her that she is not tolerating bolus tube feedings due to stomach cramping. CM reviewed Surgeon's note which reflects possible plan for continuous feeds with eventual transition to bolus tube feedings. CM to follow. 0570-CM uploaded new dietician's recommendations for continuous tube feedings to all scripts/careport for [facility]. Cm spoke with Mahesh Khan to inform of new dietician's recommendations, she advised their on call dietician will review the new recommendations tomorrow. CM to follow.   Gema Correia RN BSN CCM

## 2022-01-07 NOTE — DISCHARGE INSTRUCTIONS
PATIENT DISCHARGE INSTRUCTIONS      PATIENT DISCHARGE INSTRUCTIONS    Nahed Chaidez / [id number] : 1980    Admitted 2022 Discharged: 2022       · It is important that you take the medication exactly as they are prescribed. · Keep your medication in the bottles provided by the pharmacist and keep a list of the medication names, dosages, and times to be taken in your wallet. · Do not take other medications without consulting your doctor. What to do at Home    Recommended Diet: Tube feeds as instructed; food by mouth as tolerated (focus on protein shakes)    Recommended Activity: No heavy lifting, pushing, pulling x 2 weeks; may shower, keep gastrostomy site clean. If you experience any of the following symptoms (vomiting blood, wound drainage, feeding tube malfunction), please follow up with General Surgery.     Future Appointments   Date Time Provider Marleny Mendieta   2022  1:40 PM Carlos Martinez NP [facility] BS AMB

## 2022-01-07 NOTE — PROGRESS NOTES
Progress Note    Patient: Dea Garg MRN: [id number]  SSN: [id number]    YOB: 1980  Age: 39 y.o. Sex: female      Admit Date: 2022    2 Days Post-Op    Procedure:  Procedure(s):  LAPAROSCOPIC GASTROSTOMY WITH FEEDING TUBE PLACEMENT (URGENT)    Subjective:     Patient complains of incisional pain; she has been out of bed, walking around the room. She did not tolerate bolus feeds secondary to crampy abdominal pain. She has since started continuous feeds. Objective:     Visit Vitals  /60 (BP 1 Location: Right upper arm, BP Patient Position: At rest)   Pulse 65   Temp 98.3 °F (36.8 °C)   Resp 18   Ht 5' 5\" (1.651 m)   SpO2 100%   BMI 24.79 kg/m²       Temp (24hrs), Av.5 °F (36.9 °C), Min:97.9 °F (36.6 °C), Max:99 °F (37.2 °C)      Physical Exam:    ABDOMEN: Nondistended, soft. Laparoscopic wounds dry and intact. Left upper quadrant gastrostomy site clear. Appropriate incisional pain with palpation. Data Review: VS, I/O's    Assessment:     Hospital Problems  Date Reviewed: 2022          Codes Class Noted POA    Upper gastrointestinal hemorrhage ICD-10-CM: K92.2  ICD-9-CM: 578.9  2022 Yes        Severe protein-calorie malnutrition ([facility] [address] ICD-10-CM: E43  ICD-9-CM: 123  2022 Yes        * (Principal) GJU (gastrojejunal ulcer) ICD-10-CM: K28.9  ICD-9-CM: 534.90  1/3/2022 Yes            She has severe protein calorie malnutrition secondary to chronic marginal ulcer, nonhealing, with stricture, precluding any meaningful oral intake. Ultimately she will need revision of her anastomosis to allow her to tolerate solid food. She will need tube feeds as her sole source of nutrition for at least 90 days, until protein stores are replete, allowing revisional surgery. Plan/Recommendations/Medical Decision Making:     Continuous tube feeds, assess tolerance. Once at goal, can transition to night cycled feeds. Oral analgesics, antiemetics as needed.   Out of bed in chair, ambulation. Continue Protonix, sucralfate, soft foods as tolerated. Discharge planning-anticipate discharge to home over the weekend once goal rate reached, patient education completed.

## 2022-01-07 NOTE — PROGRESS NOTES
Clinical Pharmacy Note: IV to PO Automatic Conversion  Please note: Chloé Stevens medication (pantoprazole) has been changed from IV to PO based on the following critiera:    Patient is tolerating oral medications  Patient is tolerating a diet more advanced than clear liquids  Patient is not requiring vasopressors    This IV to PO conversion is based on the [employer] approved automatic conversion policy for eligible patients. Please call with questions.

## 2022-01-07 NOTE — PROGRESS NOTES
Events from hospitalization noted. Appreciate help from Dr. George Cano. Did not tolerate bolus feeds secondary to cramping and pain. Defer management to him regarding this but may consider continuous feeds with transition to bolus with time. I will have our schedulers move for surgery 10 March. I can see her for follow-up in 10 to 14 days after discharge.     Ileana Marcano MD  Bariatric and General Surgeon  [facility]

## 2022-01-07 NOTE — CONSULTS
Comprehensive Nutrition Assessment    Type and Reason for Visit: Initial,Consult    Nutrition Recommendations/Plan:     1. New Continuous Gtube feeding orders:   --- 2calHN at 53 ml/hr x 16 hours over night   --- 180 ml water flush q4h (or 6x/during waking hours)   --- 1 packet of Liquid Prosource via Gtube daily    2. The above provides total of: 1756 kcals, 85 gm pro, 1673 ml of free fluid    3. Continue with daily bariatric multivitamins/mineral supplements      Nutrition Assessment:  Pt's history:  42-year-old -American female with a history of morbid obesity, managed through laparoscopic gastric bypass in 2005. Her postoperative course was complicated by stricture, enteric perforation requiring laparotomy, repair versus resection (patient unsure, records unavailable). While she has done well from a weight loss standpoint, she has developed progressive dysphagia, nausea, and vomiting. Earlier this year, she was diagnosed with a marginal ulcer, and an attempt at medical management was initiated. She had some signs of healing, with some improvement in tolerance of diet, but has recently undergone recurrent worsening of dysphagia, vomiting, now with hematemesis, necessitating her recent readmission. Most recent upper gastrointestinal series revealed severe stricture at anastomosis with proximal pouch dilation. Hemoglobin has stabilized, no signs of active bleeding. She has signs of severe protein, calorie malnutrition, with a serum prealbumin of 18.5. She cannot tolerate cessation of oral intake to support her nutrition    Pt is covid + so did not enter pt's room, but did speak with pt's nurse. I also discussed tube feeding plan with Dr. Quinton Schroeder via [employer] this morning. Pt had new Gtube placed in her remnant stomach, pt has chronic marginal ulcer with stricture, tube feedings will be her main source of nutrition for an extended period of time.    I have adjusted tube feeding order to reflect pt's estimated nutritional needs w/o over-feeding her. Please see above recommendations for tube feeding order. Per MD, if pt tolerates her Gtube feeds she will d/c home later today. RD will follow up with pt if she remains an inpatient. Thank you very much for the consult. 1/7: MD consult received for Continuous tube feeding rec's as pt has not tolerated bolus. C/o cramping and abd pain. New TF rec's listed above. If pt continues to have c/o with above rec's, will need to change formula. Malnutrition Assessment:  Malnutrition Status:  Severe malnutrition    Context:  Acute illness     Findings of the 6 clinical characteristics of malnutrition:   Energy Intake:  7 - 50% or less of est energy requirements for 5 or more days  Weight Loss:  7 - Greater than 2% over 1 week     Body Fat Loss:  1 - Mild body fat loss,     Muscle Mass Loss:  1 - Mild muscle mass loss,    Fluid Accumulation:   ,     Strength:  Not performed         Estimated Daily Nutrient Needs:  Energy (kcal): ~ 9578-9003 kcals (MSJ x 1.3-1.4); Weight Used for Energy Requirements: Current  Protein (g): 1.2-1.4 gm pro/kg; Weight Used for Protein Requirements: Current  Fluid (ml/day): 1 ml/kcal; Method Used for Fluid Requirements: 1 ml/kcal      Nutrition Related Findings:         Wounds:    Surgical incision       Current Nutrition Therapies:  ADULT DIET Dysphagia - Soft & Bite Sized  ADULT ORAL NUTRITION SUPPLEMENT PM Snack, HS Snack; Low Calorie/High Protein  DIET ONE TIME MESSAGE  ADULT TUBE FEEDING Gastrostomy; 2.0 Calorie; Delivery Method: Continuous; Continuous Initial Rate (mL/hr): 30; Continuous Advance Tube Feeding: Yes; Advancement Volume (mL/hr): 10; Advancement Frequency: Q 4 hours; Continuous Goal Rate (mL/hr): 70...     Anthropometric Measures:  · Height:  5' 5\" (165.1 cm)  · Current Body Wt:  67.6 kg (149 lb 0.5 oz)   · Admission Body Wt:  149 lb 0.5 oz    · Usual Body Wt:        · Ideal Body Wt:  125 lbs:  119.2 %     Wt Readings from Last 20 Encounters:   11/15/21 67.6 kg (149 lb)   10/25/21 67.6 kg (149 lb)   10/21/21 68 kg (150 lb)   09/27/21 69.4 kg (153 lb)   06/07/21 74.4 kg (164 lb)   06/03/21 74.8 kg (164 lb 12.8 oz)   05/10/21 74.8 kg (165 lb)   04/26/21 74.8 kg (165 lb)   04/07/21 74 kg (163 lb 2.3 oz)   04/07/21 73.9 kg (163 lb)   04/03/17 77.4 kg (170 lb 9.6 oz)   09/12/14 84 kg (185 lb 3 oz)   08/21/14 85.5 kg (188 lb 9.6 oz)   04/09/14 76.7 kg (169 lb)   10/17/12 81.2 kg (179 lb)   10/12/12 79.4 kg (175 lb)        Nutrition Diagnosis:   · Inadequate oral intake related to altered GI function as evidenced by poor intake prior to admission,nutrition support-enteral nutrition      Nutrition Interventions:   Food and/or Nutrient Delivery: Continue current diet,Modify tube feeding  Nutrition Education and Counseling: No recommendations at this time  Coordination of Nutrition Care: Continue to monitor while inpatient,Coordination of community care    Goals:   Tolerance of goal Gtube feeds over the next 1-3 days       Nutrition Monitoring and Evaluation:   Behavioral-Environmental Outcomes: None identified  Food/Nutrient Intake Outcomes: Food and nutrient intake,Enteral nutrition intake/tolerance  Physical Signs/Symptoms Outcomes: GI status,Weight,Biochemical data    Discharge Planning:    Continue current diet,Enteral nutrition     Electronically signed by Irvin Vidal RD on 1/7/2022 at 10:15 AM    Contact: via [employer]

## 2022-01-08 PROCEDURE — 74011250637 HC RX REV CODE- 250/637: Performed by: SURGERY

## 2022-01-08 PROCEDURE — 74011000250 HC RX REV CODE- 250: Performed by: SURGERY

## 2022-01-08 PROCEDURE — 74011250636 HC RX REV CODE- 250/636: Performed by: SURGERY

## 2022-01-08 PROCEDURE — 96376 TX/PRO/DX INJ SAME DRUG ADON: CPT

## 2022-01-08 PROCEDURE — G0378 HOSPITAL OBSERVATION PER HR: HCPCS

## 2022-01-08 RX ADMIN — HYDROMORPHONE HYDROCHLORIDE 0.5 MG: 1 INJECTION, SOLUTION INTRAMUSCULAR; INTRAVENOUS; SUBCUTANEOUS at 17:37

## 2022-01-08 RX ADMIN — DIPHENHYDRAMINE HYDROCHLORIDE 12.5 MG: 50 INJECTION, SOLUTION INTRAMUSCULAR; INTRAVENOUS at 23:45

## 2022-01-08 RX ADMIN — HYDROMORPHONE HYDROCHLORIDE 0.5 MG: 1 INJECTION, SOLUTION INTRAMUSCULAR; INTRAVENOUS; SUBCUTANEOUS at 14:32

## 2022-01-08 RX ADMIN — SERTRALINE 50 MG: 50 TABLET, FILM COATED ORAL at 09:21

## 2022-01-08 RX ADMIN — SODIUM CHLORIDE, POTASSIUM CHLORIDE, SODIUM LACTATE AND CALCIUM CHLORIDE 100 ML/HR: 600; 310; 30; 20 INJECTION, SOLUTION INTRAVENOUS at 06:54

## 2022-01-08 RX ADMIN — HYDROMORPHONE HYDROCHLORIDE 0.5 MG: 1 INJECTION, SOLUTION INTRAMUSCULAR; INTRAVENOUS; SUBCUTANEOUS at 02:23

## 2022-01-08 RX ADMIN — HYDROMORPHONE HYDROCHLORIDE 0.5 MG: 1 INJECTION, SOLUTION INTRAMUSCULAR; INTRAVENOUS; SUBCUTANEOUS at 20:52

## 2022-01-08 RX ADMIN — SODIUM CHLORIDE, PRESERVATIVE FREE 10 ML: 5 INJECTION INTRAVENOUS at 02:23

## 2022-01-08 RX ADMIN — SODIUM CHLORIDE, PRESERVATIVE FREE 10 ML: 5 INJECTION INTRAVENOUS at 20:53

## 2022-01-08 RX ADMIN — ACETAMINOPHEN ORAL SOLUTION 650 MG: 650 SOLUTION ORAL at 09:27

## 2022-01-08 RX ADMIN — HYDROMORPHONE HYDROCHLORIDE 0.5 MG: 1 INJECTION, SOLUTION INTRAMUSCULAR; INTRAVENOUS; SUBCUTANEOUS at 11:27

## 2022-01-08 RX ADMIN — SODIUM CHLORIDE, POTASSIUM CHLORIDE, SODIUM LACTATE AND CALCIUM CHLORIDE 100 ML/HR: 600; 310; 30; 20 INJECTION, SOLUTION INTRAVENOUS at 17:49

## 2022-01-08 RX ADMIN — PANTOPRAZOLE SODIUM 40 MG: 40 TABLET, DELAYED RELEASE ORAL at 16:33

## 2022-01-08 RX ADMIN — PANTOPRAZOLE SODIUM 40 MG: 40 TABLET, DELAYED RELEASE ORAL at 06:54

## 2022-01-08 RX ADMIN — DIPHENHYDRAMINE HYDROCHLORIDE 12.5 MG: 50 INJECTION, SOLUTION INTRAMUSCULAR; INTRAVENOUS at 02:31

## 2022-01-08 RX ADMIN — HYDROMORPHONE HYDROCHLORIDE 0.5 MG: 1 INJECTION, SOLUTION INTRAMUSCULAR; INTRAVENOUS; SUBCUTANEOUS at 07:05

## 2022-01-08 RX ADMIN — DIPHENHYDRAMINE HYDROCHLORIDE 12.5 MG: 50 INJECTION, SOLUTION INTRAMUSCULAR; INTRAVENOUS at 15:25

## 2022-01-08 RX ADMIN — HYDROMORPHONE HYDROCHLORIDE 0.5 MG: 1 INJECTION, SOLUTION INTRAMUSCULAR; INTRAVENOUS; SUBCUTANEOUS at 23:45

## 2022-01-08 NOTE — PROGRESS NOTES
Progress Note    Patient: Femi Correia MRN: [id number]  SSN: [id number]    YOB: 1980  Age: 39 y.o. Sex: female      Admit Date: 2022    3 Days Post-Op    Procedure:  Procedure(s):  LAPAROSCOPIC GASTROSTOMY WITH FEEDING TUBE PLACEMENT (URGENT)    Subjective:     No acute surgical issues. Pt is tolerating tubefeeding better and able to take some PO. She is reporting abdominal pain. Objective:     Visit Vitals  BP 97/60 (BP 1 Location: Right arm, BP Patient Position: At rest)   Pulse 60   Temp 99.3 °F (37.4 °C)   Resp 18   Ht 5' 5\" (1.651 m)   SpO2 99%   BMI 24.79 kg/m²       Temp (24hrs), Av.3 °F (36.8 °C), Min:97.8 °F (36.6 °C), Max:99.3 °F (37.4 °C)        Physical Exam:    Gen:  NAD  Pulm:  Unlabored  Abd:  S/mildly distended/appropriate TTP  Wound:  C/D/I    No results found for this or any previous visit (from the past 24 hour(s)).       Assessment:     Hospital Problems  Date Reviewed: 2022          Codes Class Noted POA    Upper gastrointestinal hemorrhage ICD-10-CM: K92.2  ICD-9-CM: 578.9  2022 Yes        Severe protein-calorie malnutrition ([facility] [address] ICD-10-CM: E43  ICD-9-CM: 624  2022 Yes        * (Principal) GJU (gastrojejunal ulcer) ICD-10-CM: K28.9  ICD-9-CM: 534.90  1/3/2022 Yes              Plan/Recommendations/Medical Decision Making:     - Tubefeeding as tolerated  - Pain control  - Hopefully can DC home tomorrow when tubefeeding device is delivered to patient's home

## 2022-01-08 NOTE — PROGRESS NOTES
Pt is laying in bed with even and unlabored respirations on room air. Pt was given pain medication throughout the shift. No distress noted. Pt received one dose of benadryl for scratching around her IV site. Iv dressing was changed due to the itching. Tube feed was started at 1740. New tubing was placed and g tube is flushing well. New dressing was placed around the g tube site. Continuing tube feeds at this time and pain management. All safety precautions are in place. Bed in low position and locked. Call bell within reach. Problem: Airway Clearance - Ineffective  Goal: Achieve or maintain patent airway  Outcome: Progressing Towards Goal     Problem: Gas Exchange - Impaired  Goal: Absence of hypoxia  Outcome: Progressing Towards Goal  Goal: Promote optimal lung function  Outcome: Progressing Towards Goal     Problem: Breathing Pattern - Ineffective  Goal: Ability to achieve and maintain a regular respiratory rate  Outcome: Progressing Towards Goal     Problem:  Body Temperature -  Risk of, Imbalanced  Goal: Ability to maintain a body temperature within defined limits  Outcome: Progressing Towards Goal  Goal: Will regain or maintain usual level of consciousness  Outcome: Progressing Towards Goal  Goal: Complications related to the disease process, condition or treatment will be avoided or minimized  Outcome: Progressing Towards Goal     Problem: Isolation Precautions - Risk of Spread of Infection  Goal: Prevent transmission of infectious organism to others  Outcome: Progressing Towards Goal     Problem: Nutrition Deficits  Goal: Optimize nutrtional status  Outcome: Progressing Towards Goal     Problem: Risk for Fluid Volume Deficit  Goal: Maintain normal heart rhythm  Outcome: Progressing Towards Goal  Goal: Maintain absence of muscle cramping  Outcome: Progressing Towards Goal  Goal: Maintain normal serum potassium, sodium, calcium, phosphorus, and pH  Outcome: Progressing Towards Goal     Problem: Loneliness or Risk for Loneliness  Goal: Demonstrate positive use of time alone when socialization is not possible  Outcome: Progressing Towards Goal     Problem: Fatigue  Goal: Verbalize increase energy and improved vitality  Outcome: Progressing Towards Goal     Problem: Patient Education: Go to Patient Education Activity  Goal: Patient/Family Education  Outcome: Progressing Towards Goal     Problem: Discharge Planning  Goal: *Discharge to safe environment  Outcome: Progressing Towards Goal     Problem: Nutrition Deficit  Goal: *Optimize nutritional status  Outcome: Progressing Towards Goal     Problem: Falls - Risk of  Goal: *Absence of Falls  Description: Document Ernesto Fall Risk and appropriate interventions in the flowsheet.   Outcome: Progressing Towards Goal  Note: Fall Risk Interventions:            Medication Interventions: Evaluate medications/consider consulting pharmacy                   Problem: Patient Education: Go to Patient Education Activity  Goal: Patient/Family Education  Outcome: Progressing Towards Goal

## 2022-01-09 VITALS
SYSTOLIC BLOOD PRESSURE: 114 MMHG | BODY MASS INDEX: 24.79 KG/M2 | RESPIRATION RATE: 17 BRPM | HEART RATE: 69 BPM | TEMPERATURE: 97 F | HEIGHT: 65 IN | DIASTOLIC BLOOD PRESSURE: 69 MMHG | OXYGEN SATURATION: 100 %

## 2022-01-09 LAB
COVID-19 RAPID TEST, COVR: NOT DETECTED
SOURCE, COVRS: NORMAL

## 2022-01-09 PROCEDURE — G0378 HOSPITAL OBSERVATION PER HR: HCPCS

## 2022-01-09 PROCEDURE — 96376 TX/PRO/DX INJ SAME DRUG ADON: CPT

## 2022-01-09 PROCEDURE — 74011250636 HC RX REV CODE- 250/636: Performed by: SURGERY

## 2022-01-09 PROCEDURE — 74011250637 HC RX REV CODE- 250/637: Performed by: SURGERY

## 2022-01-09 PROCEDURE — 87635 SARS-COV-2 COVID-19 AMP PRB: CPT

## 2022-01-09 PROCEDURE — 74011000250 HC RX REV CODE- 250: Performed by: SURGERY

## 2022-01-09 RX ADMIN — HYDROMORPHONE HYDROCHLORIDE 0.5 MG: 1 INJECTION, SOLUTION INTRAMUSCULAR; INTRAVENOUS; SUBCUTANEOUS at 08:04

## 2022-01-09 RX ADMIN — PANTOPRAZOLE SODIUM 40 MG: 40 TABLET, DELAYED RELEASE ORAL at 06:45

## 2022-01-09 RX ADMIN — HYDROMORPHONE HYDROCHLORIDE 0.5 MG: 1 INJECTION, SOLUTION INTRAMUSCULAR; INTRAVENOUS; SUBCUTANEOUS at 11:41

## 2022-01-09 RX ADMIN — DIPHENHYDRAMINE HYDROCHLORIDE 12.5 MG: 50 INJECTION, SOLUTION INTRAMUSCULAR; INTRAVENOUS at 09:23

## 2022-01-09 RX ADMIN — SODIUM CHLORIDE, PRESERVATIVE FREE 10 ML: 5 INJECTION INTRAVENOUS at 05:15

## 2022-01-09 RX ADMIN — HYDROMORPHONE HYDROCHLORIDE 0.5 MG: 1 INJECTION, SOLUTION INTRAMUSCULAR; INTRAVENOUS; SUBCUTANEOUS at 04:11

## 2022-01-09 RX ADMIN — SERTRALINE 50 MG: 50 TABLET, FILM COATED ORAL at 09:23

## 2022-01-09 RX ADMIN — SODIUM CHLORIDE, POTASSIUM CHLORIDE, SODIUM LACTATE AND CALCIUM CHLORIDE 100 ML/HR: 600; 310; 30; 20 INJECTION, SOLUTION INTRAVENOUS at 05:13

## 2022-01-09 NOTE — PROGRESS NOTES
Progress Note    Patient: Nancy Lowe MRN: [id number]  SSN: [id number]    YOB: 1980  Age: 39 y.o. Sex: female      Admit Date: 2022    4 Days Post-Op    Procedure:  Procedure(s):  LAPAROSCOPIC GASTROSTOMY WITH FEEDING TUBE PLACEMENT (URGENT)    Subjective:     No acute surgical issues. Pt is tolerating tubefeeding. Pain is under control. Objective:     Visit Vitals  /69 (BP 1 Location: Right upper arm, BP Patient Position: At rest)   Pulse 69   Temp 97 °F (36.1 °C)   Resp 17   Ht 5' 5\" (1.651 m)   SpO2 100%   BMI 24.79 kg/m²       Temp (24hrs), Av °F (36.7 °C), Min:97 °F (36.1 °C), Max:99.3 °F (37.4 °C)        Physical Exam:    Gen:  NAD  Pulm:  Unlabored  Abd:  S/mildly distended/appropriate TTP  Wound:  C/D/I    No results found for this or any previous visit (from the past 24 hour(s)).       Assessment:     Hospital Problems  Date Reviewed: 2022          Codes Class Noted POA    Upper gastrointestinal hemorrhage ICD-10-CM: K92.2  ICD-9-CM: 578.9  2022 Yes        Severe protein-calorie malnutrition ([facility] [address] ICD-10-CM: E43  ICD-9-CM: 345  2022 Yes        * (Principal) GJU (gastrojejunal ulcer) ICD-10-CM: K28.9  ICD-9-CM: 534.90  1/3/2022 Yes              Plan/Recommendations/Medical Decision Making:     - Tubefeeding as tolerated  - Pain control  - Plan DC home this am  - Rapid COVID test prior to discharge

## 2022-01-09 NOTE — PROGRESS NOTES
CM reviewed chart and noted discharge orders. CM called [facility] to confirm that they were able to provide pt's tube feeding. Amber Conrad with [facility] confirmed that they can provide pt's tube feedings to her today when she gets home.     Gypsy Cheney, BSW, ACM

## 2022-01-10 ENCOUNTER — PATIENT OUTREACH (OUTPATIENT)
Dept: CASE MANAGEMENT | Age: 42
End: 2022-01-10

## 2022-01-10 NOTE — PROGRESS NOTES
Patient contacted regarding COVID-19 diagnosis. Discussed COVID-19 related testing which was available at this time. Test results were positive. Patient informed of results, if available? yes. Patient states she is fully vaccinated against covid19. Care Transition Nurse contacted the patient by telephone to perform post discharge assessment. Call within 2 business days of discharge: Yes Verified name and  with patient as identifiers. Provided introduction to self, and explanation of the CTN/ACM role, and reason for call due to risk factors for infection and/or exposure to COVID-19. Symptoms reviewed with patient who verbalized the following symptoms: Patient asymptomatic       Due to no new or worsening symptoms encounter was not routed to provider for escalation. Discussed follow-up appointments. If no appointment was previously scheduled, appointment scheduling offered:  yes. [facility] follow up appointment(s):   Future Appointments   Date Time Provider Marleny Mendieta   2022  1:40 PM Shani Pillai NP [facility] BS [facility]     Non-[facility] follow up appointment(s): none    Interventions to address risk factors: Obtained and reviewed discharge summary and/or continuity of care documents     Advance Care Planning:   Does patient have an Advance Directive: not on file. Educated patient about risk for severe COVID-19 due to risk factors according to CDC guidelines. CTN reviewed discharge instructions, medical action plan and red flag symptoms with the patient who verbalized understanding. Discussed COVID vaccination status: yes. Education provided on COVID-19 vaccination as appropriate. Discussed exposure protocols and quarantine with CDC Guidelines. Patient was given an opportunity to verbalize any questions and concerns and agrees to contact CTN or health care provider for questions related to their healthcare.     Reviewed and educated patient on any new and changed medications related to discharge diagnosis Was patient discharged with a pulse oximeter? no     CTN provided contact information. Plan for follow-up call in 5-7 days based on severity of symptoms and risk factors.

## 2022-01-10 NOTE — PROGRESS NOTES
Pt is laying in bed with even and unlabored respirations on room air. No complaints of pain at this time. No distress noted. AVS ws given to the pt and explained. All questions were answered. IV was removed with little to no bleeding noted. All belongings were collected and returned to the pt in pt belongings bags. Dressing was placed on g tube. Pt was wheeled downstairs and daughter is taking pt home. All safety precautions are in place. Problem: Airway Clearance - Ineffective  Goal: Achieve or maintain patent airway  Outcome: Resolved/Met     Problem: Gas Exchange - Impaired  Goal: Absence of hypoxia  Outcome: Resolved/Met  Goal: Promote optimal lung function  Outcome: Resolved/Met     Problem: Breathing Pattern - Ineffective  Goal: Ability to achieve and maintain a regular respiratory rate  Outcome: Resolved/Met     Problem:  Body Temperature -  Risk of, Imbalanced  Goal: Ability to maintain a body temperature within defined limits  Outcome: Resolved/Met  Goal: Will regain or maintain usual level of consciousness  Outcome: Resolved/Met  Goal: Complications related to the disease process, condition or treatment will be avoided or minimized  Outcome: Resolved/Met     Problem: Isolation Precautions - Risk of Spread of Infection  Goal: Prevent transmission of infectious organism to others  Outcome: Resolved/Met     Problem: Nutrition Deficits  Goal: Optimize nutrtional status  Outcome: Resolved/Met     Problem: Risk for Fluid Volume Deficit  Goal: Maintain normal heart rhythm  Outcome: Resolved/Met  Goal: Maintain absence of muscle cramping  Outcome: Resolved/Met  Goal: Maintain normal serum potassium, sodium, calcium, phosphorus, and pH  Outcome: Resolved/Met     Problem: Loneliness or Risk for Loneliness  Goal: Demonstrate positive use of time alone when socialization is not possible  Outcome: Resolved/Met     Problem: Fatigue  Goal: Verbalize increase energy and improved vitality  Outcome: Resolved/Met Problem: Patient Education: Go to Patient Education Activity  Goal: Patient/Family Education  Outcome: Resolved/Met     Problem: Discharge Planning  Goal: *Discharge to safe environment  Outcome: Resolved/Met     Problem: Nutrition Deficit  Goal: *Optimize nutritional status  Outcome: Resolved/Met     Problem: Falls - Risk of  Goal: *Absence of Falls  Description: Document Ernesto Fall Risk and appropriate interventions in the flowsheet.   Outcome: Resolved/Met  Note: Fall Risk Interventions:            Medication Interventions: Evaluate medications/consider consulting pharmacy                   Problem: Patient Education: Go to Patient Education Activity  Goal: Patient/Family Education  Outcome: Resolved/Met

## 2022-01-11 ENCOUNTER — TELEPHONE (OUTPATIENT)
Dept: SURGERY | Age: 42
End: 2022-01-11

## 2022-01-11 NOTE — TELEPHONE ENCOUNTER
Patient needs a note to return to work. Patient also stated she is having stomach pain and wants to know if we can call in a prescription for her. Please advise.

## 2022-01-11 NOTE — TELEPHONE ENCOUNTER
Patient identified with two patient identifiers. Patient has C/O increased abdominal pain at feeding tube site. States she wants to request alternate pain medication states Percocet prescribe does nothing for her and she can bring us the pills to discard. Patient states she didn't move her bowel the entire time she was admitted. Patient states she thinks her constipation is related to narcotics given in patient. Patient informed she needs to start over the counter stool softener such as miralax. Patient has not taken anything for constipation. She no C/O nausea, vomiting, or fever. Per patient she is eating sometimes even though she has the tube. Patient informed she is still scheduled for visit tomorrow in the office, she will keep this appointment to be evaluated and discuss pain medication and needing letter for work. Per patient she has been out since October her job was covering her but now they need a note. Patient expressed understanding. Will return call if any other questions or concerns.

## 2022-01-12 ENCOUNTER — TELEPHONE (OUTPATIENT)
Dept: SURGERY | Age: 42
End: 2022-01-12

## 2022-01-12 ENCOUNTER — OFFICE VISIT (OUTPATIENT)
Dept: SURGERY | Age: 42
End: 2022-01-12
Payer: COMMERCIAL

## 2022-01-12 VITALS
BODY MASS INDEX: 25.49 KG/M2 | WEIGHT: 153 LBS | DIASTOLIC BLOOD PRESSURE: 78 MMHG | HEIGHT: 65 IN | TEMPERATURE: 99.2 F | OXYGEN SATURATION: 98 % | RESPIRATION RATE: 18 BRPM | HEART RATE: 86 BPM | SYSTOLIC BLOOD PRESSURE: 118 MMHG

## 2022-01-12 DIAGNOSIS — G89.18 POSTOPERATIVE PAIN: Primary | ICD-10-CM

## 2022-01-12 PROCEDURE — 99024 POSTOP FOLLOW-UP VISIT: CPT | Performed by: NURSE PRACTITIONER

## 2022-01-12 RX ORDER — HYDROMORPHONE HYDROCHLORIDE 5 MG/5ML
1 SOLUTION ORAL
Qty: 25 ML | Refills: 0 | Status: SHIPPED | OUTPATIENT
Start: 2022-01-12 | End: 2022-01-19

## 2022-01-12 RX ORDER — ONDANSETRON 4 MG/1
4 TABLET, ORALLY DISINTEGRATING ORAL
Qty: 12 TABLET | Refills: 0 | Status: SHIPPED | OUTPATIENT
Start: 2022-01-12 | End: 2022-02-11

## 2022-01-12 RX ORDER — ACETAMINOPHEN 160 MG/5ML
15 LIQUID ORAL
Qty: 975 ML | Refills: 0 | Status: SHIPPED | OUTPATIENT
Start: 2022-01-12 | End: 2022-01-22

## 2022-01-12 NOTE — PROGRESS NOTES
Subjective:      Hugo Casas is a 39 y.o. female presents for postop care laparoscopic gastrostomy  Insertion. Patient history of morbid obesity managed to laparoscopic gastric bypass in 2005. Her postoperative course was complicated by stricture, enteric perforation requiring laparotomy, repair versus resection. Patient developed progressive dysphagia nausea and vomiting. Earlier this year she was diagnosed with a marginal ulcer and medical management was initiated. She had some signs of healing with some improvement and tolerance of diet but recently has had recurrent of worsening dysphagia, vomiting. The most recent upper GI series revealed severe stricture at the anastomosis with proximal pouch dilation. Patient is scheduled for laparoscopic resection and reconstruction of gastrojejunostomy with truncal vagotomy for March 23, 2022. She is currently using TwoCal HN at 53 mL an hour over 16 hours at night. She states that this is making her nauseous, she is sweating. She states that she is unable to tolerate this many hours of feeding. She complains of pain at her G-tube site. She is unable to swallow the Percocet that she was discharged home with. She is requesting a different pain medication. She is frustrated and tearful. Ms. Wilmer Carey has a reminder for a \"due or due soon\" health maintenance. I have asked that she contact her primary care provider for follow-up on this health maintenance. Objective:     Visit Vitals  /78   Pulse 86   Temp 99.2 °F (37.3 °C) (Oral)   Resp 18   Ht 5' 5\" (1.651 m)   Wt 153 lb (69.4 kg)   SpO2 98%   BMI 25.46 kg/m²       General:  alert, tearful   Abdomen: soft, tender at G-tube site. Assessment:   Pain status post G-tube placement. Plan:     Dilaudid 1 mg elixir three times a day x7 days prescribed.   Tylenol elixir three times a day x10 days prescribed  Advised patient to alternate Dilaudid and Tylenol for pain through her G-tube. Advised to push clear liquids through G-tube as well as remain hydrated. She may space out her G-tube feedings to 53 mL x 8 hours, take a break, flush with fluids then resume 53 mL x 8 hours overnight. Patient advised to use Zofran as needed before nighttime feed. She will follow-up in a week with Dr. Mary Tavarez.

## 2022-01-12 NOTE — LETTER
NOTIFICATION RETURN TO WORK / SCHOOL    1/12/2022 2:37 PM    Ms. Malloy [phone]      To Whom It May Concern:    Bettye Solo is currently under the care of George Tatum from 1/5/2022 until present. Please excuse her from work from 1/5/2022 until 1/21/2022. Her return to work will be determined at her next follow up visit. If there are questions or concerns please have the patient contact our office.         Sincerely,      Vicki Boo NP

## 2022-01-12 NOTE — TELEPHONE ENCOUNTER
Returned call to patient to clarify dates needed for note. States she does not have FMLA as an option at work and needs letter for work to include dates from 11/23/21- 1/21/22. Advised will forward request to provider.

## 2022-01-12 NOTE — TELEPHONE ENCOUNTER
Patient called stating she needs a note similar to the note typed on 11/23/21. Patient needs a note excusing her work on the following dates: 11/23/21-1/21/22    Patient would like note uploaded into My Chart. Please advise.

## 2022-01-12 NOTE — PROGRESS NOTES
1. Have you been to the ER, urgent care clinic since your last visit? Hospitalized since your last visit? yes    2. Have you seen or consulted any other health care providers outside of the [address] [place] [address] since your last visit? Include any pap smears or colon screening. States as soon as she hooks up feeding tube at night, she immediately feels nauseated & experiences sweats. She is having pain to insertion site of feeding tube. States pain medication is not effective. She has brought pain pills with her today and would like to get prescription for something else. States difficulty swallowing pills.

## 2022-01-13 NOTE — TELEPHONE ENCOUNTER
Message sent to Dr. Morales [facility]. Letter sent. Pt notified of MD response. See message:      to be from 11/23/21- 1/21/22 for her work letter. January 13, 2022    Me  to Camila Zimmerman MD    GS      8:00 AM  Pt sent pic of wound, she states she thinks it is infected? ? She is also requesting to extend date for work excuse to 1/21/22. Prev work excuse note is 10/22-12/16. Surgery scheduled 3/23/22       Camila Zimmerman MD  to Me    CS      8:12 AM  Not infected. They rarely if ever get infected. See Eneida or me for post op as scheduled. Yes, give her a note to be off until 4/15 if she would like (post op from revision). Otherwise, if she wants to work change it to whatever date she wants.      Thanks

## 2022-01-17 ENCOUNTER — OFFICE VISIT (OUTPATIENT)
Dept: SURGERY | Age: 42
End: 2022-01-17
Payer: COMMERCIAL

## 2022-01-17 ENCOUNTER — TELEPHONE (OUTPATIENT)
Dept: SURGERY | Age: 42
End: 2022-01-17

## 2022-01-17 VITALS
HEIGHT: 65 IN | DIASTOLIC BLOOD PRESSURE: 86 MMHG | WEIGHT: 152 LBS | BODY MASS INDEX: 25.33 KG/M2 | SYSTOLIC BLOOD PRESSURE: 131 MMHG

## 2022-01-17 DIAGNOSIS — K59.00 CONSTIPATION, UNSPECIFIED CONSTIPATION TYPE: ICD-10-CM

## 2022-01-17 DIAGNOSIS — G89.18 POST-OP PAIN: Primary | ICD-10-CM

## 2022-01-17 DIAGNOSIS — E44.0 MODERATE PROTEIN-CALORIE MALNUTRITION (HCC): ICD-10-CM

## 2022-01-17 PROCEDURE — 99024 POSTOP FOLLOW-UP VISIT: CPT | Performed by: SURGERY

## 2022-01-17 RX ORDER — ONDANSETRON 4 MG/1
4 TABLET, ORALLY DISINTEGRATING ORAL
Qty: 60 TABLET | Refills: 2 | Status: SHIPPED | OUTPATIENT
Start: 2022-01-17

## 2022-01-17 RX ORDER — NUT.TX.GLUC.INTOLER,LAC-FR,SOY
1 LIQUID (ML) ORAL 4 TIMES DAILY
Qty: 40 EACH | Refills: 1 | Status: SHIPPED | OUTPATIENT
Start: 2022-01-17 | End: 2022-01-27

## 2022-01-17 RX ORDER — HYDROMORPHONE HYDROCHLORIDE 5 MG/5ML
1 SOLUTION ORAL
Qty: 15 ML | Refills: 0 | Status: SHIPPED | OUTPATIENT
Start: 2022-01-17 | End: 2022-01-22

## 2022-01-17 RX ORDER — POLYETHYLENE GLYCOL 3350 17 G/17G
17 POWDER, FOR SOLUTION ORAL 2 TIMES DAILY
Qty: 40 PACKET | Refills: 1 | Status: SHIPPED | OUTPATIENT
Start: 2022-01-17 | End: 2022-02-06

## 2022-01-17 NOTE — PROGRESS NOTES
1. Have you been to the ER, urgent care clinic since your last visit? Hospitalized since your last visit? Saw NP on 1/12/22    2. Have you seen or consulted any other health care providers outside of the [address] [place] [address] since your last visit? Include any pap smears or colon screening.  Saw NP on 1/12/22

## 2022-01-17 NOTE — TELEPHONE ENCOUNTER
Patient called wanting a call back to discuss if all their medications can be sent it at once so they patient can pick them all up at once.  Please advise

## 2022-01-17 NOTE — PROGRESS NOTES
Surgery Progress Note    1/17/2022    CC: Post Operative state    Subjective:     Patient is 2 weeks out from laparoscopic gastric tube placement for recurrent bleeding marginal ulcer. She been taking very minimal in by mouth. She has tried some bolus feeds at home but experiences dumping syndrome with a severe visceral response. She been doing continuous feed. She is on high calorie formulation. She is having nausea and pain. [place] G-tube concerns. Here with her mother and brother. Constitutional: No fever or chills  Neurologic: No headache  Eyes: No scleral icterus or irritated eyes  Nose: No nasal pain or drainage  Mouth: No oral lesions or sore throat  Cardiac: No palpations or chest pain  Pulmonary: No cough or shortness of breath  Gastrointestinal: Abdominal pain, G-tube oozing, no nausea, emesis, diarrhea, or constipation  Genitourinary: No dysuria  Musculoskeletal: No muscle or joint tenderness  Skin: No rashes or lesions  Psychiatric: No anxiety or depressed mood    Objective:     Visit Vitals  /86   Ht 5' 5\" (1.651 m)   Wt 152 lb (68.9 kg)   BMI 25.29 kg/m²       General: No acute distress, conversant  Eyes: PERRLA, no scleral icterus  HENT: Normocephalic without oral lesions  Neck: Trachea midline without LAD  Cardiac: Normal pulse rate and rhythm  Pulmonary: Symmetric chest rise with normal effort  GI: Soft, G-tube site clean dry and intact, sutures in place  Skin: Warm without rash  Extremities: No edema or joint stiffness  Psych: Appropriate mood and affect    Assessment:     49-year-old female with recurrent marginal ulcer planning for surgery soon who presented for bleeding ulcer needing a feeding tube, here for further management    Plan:     Stay sutures removed from G-tube  Suspect she is having dumping syndrome from her high osmotic formulation when she tries to bolus feed.   I will try to coordinate with her home health company to maybe give her a diabetic like formulation to decrease the osmotic burden. Glucerna or Osmolite? She does not tolerate tube feeds we can always discuss TPN. Continue continuous feeds as tolerated. Glucerna trial ordered to the pharmacy. Continue PPI  I will refill her Dilaudid for 1 more time  I will order MiraLAX twice a day for constipation via her G-tube along with Zofran. We will check a UA and nutritional markers today. Discussed with the patient her plan for surgery in March for GJ revision, vagotomy, hernia repair.     Patient is to follow-up next week with either me or the NP in office          Fabien Wynn MD  Bariatric and General Surgeon  [facility]

## 2022-01-18 RX ORDER — SERTRALINE HYDROCHLORIDE 50 MG/1
TABLET, FILM COATED ORAL
Qty: 30 TABLET | Refills: 0 | Status: SHIPPED | OUTPATIENT
Start: 2022-01-18 | End: 2022-03-07 | Stop reason: SDUPTHER

## 2022-01-20 ENCOUNTER — TELEPHONE (OUTPATIENT)
Dept: SURGERY | Age: 42
End: 2022-01-20

## 2022-01-20 NOTE — TELEPHONE ENCOUNTER
Left message for pt to call office back. I was calling to check in with her, see how he is doing, and I also had a few questions for her.

## 2022-01-21 ENCOUNTER — PATIENT OUTREACH (OUTPATIENT)
Dept: CASE MANAGEMENT | Age: 42
End: 2022-01-21

## 2022-01-24 ENCOUNTER — OFFICE VISIT (OUTPATIENT)
Dept: SURGERY | Age: 42
End: 2022-01-24
Payer: COMMERCIAL

## 2022-01-24 VITALS
DIASTOLIC BLOOD PRESSURE: 73 MMHG | BODY MASS INDEX: 25.83 KG/M2 | HEART RATE: 76 BPM | HEIGHT: 65 IN | TEMPERATURE: 98.6 F | RESPIRATION RATE: 18 BRPM | OXYGEN SATURATION: 98 % | WEIGHT: 155 LBS | SYSTOLIC BLOOD PRESSURE: 114 MMHG

## 2022-01-24 DIAGNOSIS — K28.9 MARGINAL ULCER: Primary | ICD-10-CM

## 2022-01-24 PROCEDURE — 99024 POSTOP FOLLOW-UP VISIT: CPT | Performed by: SURGERY

## 2022-01-24 NOTE — PROGRESS NOTES
Surgery Progress Note    1/24/2022    CC: Postoperative pain    Subjective:     Patient was denied further pain medication last week. I told her to come to the ER if her pain persisted for imaging and TPN. The patient has been able to adapt and does not want TPN. She is tolerating nocturnal feeds at 90 cc/h. She had some large BMs and is now feeling better with her abdominal pain. She is tolerating some p.o. with soups and oral liquids. She has gained 3 pounds in the last week. Constitutional: No fever or chills  Neurologic: No headache  Eyes: No scleral icterus or irritated eyes  Nose: No nasal pain or drainage  Mouth: No oral lesions or sore throat  Cardiac: No palpations or chest pain  Pulmonary: No cough or shortness of breath  Gastrointestinal: Abdominal pain, no nausea, emesis, diarrhea, or constipation  Genitourinary: No dysuria  Musculoskeletal: No muscle or joint tenderness  Skin: No rashes or lesions  Psychiatric: No anxiety or depressed mood    Objective:     Visit Vitals  /73   Pulse 76   Temp 98.6 °F (37 °C) (Oral)   Resp 18   Ht 5' 5\" (1.651 m)   Wt 155 lb (70.3 kg)   SpO2 98%   BMI 25.79 kg/m²       General: No acute distress, conversant  Eyes: PERRLA, no scleral icterus  HENT: Normocephalic without oral lesions  Neck: Trachea midline without LAD  Cardiac: Normal pulse rate and rhythm  Pulmonary: Symmetric chest rise with normal effort  GI: Soft, G-tube site with some fibrinous exudate but no erythema or infection  Skin: Warm without rash  Extremities: No edema or joint stiffness  Psych: Appropriate mood and affect    Assessment:     41-year-old female with recurrent marginal ulcer who is now tolerating her tube feeds and has controlled G-tube pain    Plan:     I will have my nurse tomorrow contact her to get her home tube feed supplies. Continue her previously prescribed formula. I will see her back in 2 weeks for checkup. I am very encouraged with how she looks today.     Jessa Ortiz Deep Faust MD  Bariatric and General Surgeon  [facility]

## 2022-01-26 ENCOUNTER — TELEPHONE (OUTPATIENT)
Dept: SURGERY | Age: 42
End: 2022-01-26

## 2022-01-27 ENCOUNTER — TELEPHONE (OUTPATIENT)
Dept: SURGERY | Age: 42
End: 2022-01-27

## 2022-01-27 NOTE — TELEPHONE ENCOUNTER
Tried calling patient back yesterday afternoon and again today, left message, we are playing phone tag. I am awaiting a call back from [facility] with intake at [address] [place] [address]. I faxed over all pt information yesterday. We were hoping to get [place] out before the weekend but uncertain if this will happen,. There is possibly some availability on Sunday but unsure if that is ok with pt. Id weekend does not work then we can set up for one day next week.

## 2022-01-31 ENCOUNTER — TELEPHONE (OUTPATIENT)
Dept: SURGERY | Age: 42
End: 2022-01-31

## 2022-01-31 ENCOUNTER — PATIENT OUTREACH (OUTPATIENT)
Dept: CASE MANAGEMENT | Age: 42
End: 2022-01-31

## 2022-01-31 NOTE — TELEPHONE ENCOUNTER
Alis with [address] called stating the patient is declining home health care. They state she is independent with her feedings.

## 2022-01-31 NOTE — TELEPHONE ENCOUNTER
Called and spoke with Nayeli at [facility]. Advised Dr. Danish Kearney will be ordering physician and she can fax any orders.

## 2022-01-31 NOTE — TELEPHONE ENCOUNTER
Prem's basno from [address] called asking if Dr. Josee Bowen will be the provider that will sign off on the patient's home health orders

## 2022-02-02 ENCOUNTER — TELEPHONE (OUTPATIENT)
Dept: SURGERY | Age: 42
End: 2022-02-02

## 2022-02-02 NOTE — TELEPHONE ENCOUNTER
Returned call to Ms Manisha Coyle verified all PHI. Ms Manisha Coyle states the feeding is coming tomorrow. She spoke with coordinator was told she may substitute the feeding until the order arrives.

## 2022-02-11 ENCOUNTER — OFFICE VISIT (OUTPATIENT)
Dept: SURGERY | Age: 42
End: 2022-02-11
Payer: COMMERCIAL

## 2022-02-11 VITALS
TEMPERATURE: 98.6 F | HEART RATE: 79 BPM | RESPIRATION RATE: 18 BRPM | SYSTOLIC BLOOD PRESSURE: 110 MMHG | DIASTOLIC BLOOD PRESSURE: 75 MMHG | BODY MASS INDEX: 27.29 KG/M2 | WEIGHT: 163.8 LBS | OXYGEN SATURATION: 99 % | HEIGHT: 65 IN

## 2022-02-11 DIAGNOSIS — R11.0 NAUSEA: ICD-10-CM

## 2022-02-11 DIAGNOSIS — Z09 FOLLOW-UP EXAMINATION AFTER ABDOMINAL SURGERY: Primary | ICD-10-CM

## 2022-02-11 DIAGNOSIS — Z09 S/P GASTROSTOMY TUBE (G TUBE) PLACEMENT, FOLLOW-UP EXAM: ICD-10-CM

## 2022-02-11 DIAGNOSIS — K28.9 MARGINAL ULCER: ICD-10-CM

## 2022-02-11 PROCEDURE — 99024 POSTOP FOLLOW-UP VISIT: CPT

## 2022-02-11 RX ORDER — PANTOPRAZOLE SODIUM 40 MG/1
40 TABLET, DELAYED RELEASE ORAL DAILY
Qty: 30 TABLET | Refills: 1 | Status: SHIPPED | OUTPATIENT
Start: 2022-02-11 | End: 2022-03-11

## 2022-02-11 NOTE — Clinical Note
2/11/2022 11:44 AM    Ms. Pedroza  [address] [facility] [[email or web address]] [id number]              Sincerely,      Kath Bergman NP

## 2022-02-11 NOTE — PROGRESS NOTES
1. Have you been to the ER, urgent care clinic since your last visit? Hospitalized since your last visit? no    2. Have you seen or consulted any other health care providers outside of the [address] [place] [address] since your last visit? Include any pap smears or colon screening.  no

## 2022-02-11 NOTE — PROGRESS NOTES
Subjective:        Liliana Cedeno is a 39 y.o. female presents for postop care laparoscopic gastrostomy  Insertion. Patient history of morbid obesity managed to laparoscopic gastric bypass in 2005. Her postoperative course was complicated by stricture, enteric perforation requiring laparotomy, repair versus resection. Patient developed progressive dysphagia nausea and vomiting. Back in April 2021 she was diagnosed with a marginal ulcer and medical management was initiated. She had some signs of healing with some improvement and tolerance of diet, but then developed recurrent of worsening dysphagia, vomiting. She was admitted to the ER earlier this year and had a laparoscopic gastrostomy  feeing tube placement (1/5/22). The most recent upper GI series revealed severe stricture at the anastomosis and noted small gastric ulcer. Patient is scheduled for laparoscopic resection and reconstruction of gastrojejunostomy with truncal vagotomy for March 23, 2022. She presents today with c/o of severe intermittent pain to gastrostomy tube site. Hypertrophic granulation tissue noted at the point where her pain is occurring. She states the pain only comes on when she is moving and the site is irritated. She is tolerating nocturnal feeds at 90 cc/h. In addition, she is tolerating some p.o. with soups and oral liquids. Reports some occasional nausea that resolves with PRN Zofran. She has gained 8 pounds since her last appointment about 3 weeks ago. Patient expressed her frustration with attending recent bariatric seminar. She stated \"it made her anxious about her upcoming surgery and didn't feel like the information provided was helpful to her. \" She is currently working as a  and is enjoying her new job. She feel like the job is stress free and supportive of her current medical needs. Patient denies fever, chest pain, or shortness of breath.          Ms. Prabhakar Prater has a reminder for a \"due or due soon\" health maintenance. I have asked that she contact her primary care provider for follow-up on this health maintenance. Objective: There were no vitals taken for this visit. General: alert, cooperative, no distress, appears stated age, patient is extremely tearful and expresses her fears for her upcoming surgery. CV: Regular rate and rhythm  Pulmonary: Lungs clear to auscultation   Abdomen:soft, bowel sounds active, hypertrophic granulation tissue noted at 9 o'clock to gtube. , some crusting noted around gtube, no other drainage, no erythema     Assessment:     postop laparoscopic gastrostomy  Insertion    Plan:     Silver Nitrate applied by Dr. José Miguel Bhardwaj to Hypertrophic granulation site and gauze applied    Continue with nocturnal feedings 90cc/hr. PRN Zofran for nausea. Advised to push clear liquids through G-tube as well as remain hydrated. Patient stated that she is not taking Prilosec or Carafate for marginal ulcer and would prefer to take Protonix. Protonix prescription sent to pharmacy. Continue with support groups  Follow up in 2 weeks for preoperative appointment with Dr. José Miguel Bhardwaj  All questions were personally answered. Thank you for allowing us to participate in the care of your patient.     > 35 minutes were spent with patient with greater than 50% of that time spent face to face counseling    Soco Crawley NP  [facility]   2/11/2022

## 2022-02-11 NOTE — PATIENT INSTRUCTIONS
Follow up in two weeks with Dr. Darryle Bilis with Tube feedings    Call for any additional questions or concerns    Continue with Support groups           Learning About Living With a Feeding Tube  What is tube feeding? Your body needs nutrition to stay strong and help you live a healthy life. If you're unable to eat, or if you have an illness that makes it hard to swallow food, you may need a feeding tube. The tube is placed in the stomach and is used to give food, liquids, and medicines. Depending on why you need a feeding tube, you may have it for several weeks or months or longer. When you first get a feeding tube, your biggest challenge may be your new relationship with food. For many people, eating and savoring food is one of the most pleasing parts of daily life. You may grieve the loss of the daily habit of eating and the social aspects of sharing food with others. If you've struggled to get enough nutrition--if it's been hard to eat or swallow--having a feeding tube can help you regain your health and strength. And understanding how a feeding tube works is a first step toward dealing with changes that come with having the tube. It can also help you avoid common problems that can occur. What can you expect when you have a feeding tube? After surgery to insert a feeding tube, you'll have a 6- to 12-inch tube coming out of your belly. The tube is about the same width as a pen. There are different ways the tube can be used for feeding. Your doctor will help you decide which is best for you and how often feedings should occur. A feeding syringe. A syringe is connected to the tube. A nutritional mixture (formula) is put into the syringe and flows into the tube and your stomach. This is called bolus feeding. A gravity bag. Formula is placed into a special bag that is hung on a hook or a pole. The height and weight of the bag make the food flow down the tube and into your stomach.   A bag and pump.   A pump is used to push formula from a bag through the tube. This is also called continuous feeding. How do you use a feeding tube? It's important that the food you use for tube feeding has the right blend of nutrients for you. And the food needs to be the correct thickness so the tube doesn't clog. For most people, a liquid formula that you can buy in a can works best for tube feeding. Your doctor or dietitian will help you find the right formula to use. Each time you use the tube for feeding:  · Make sure that the tube-feeding formula is at room temperature. · Wash your hands before you handle the tube and formula. Wash the top of the can of formula before you open it. · Follow your doctor's instructions for how much formula to use for each feeding. ? If using a feeding syringe: Connect the syringe to the tube, and put the formula into the syringe. Hold the syringe up high so the formula flows into the tube. Use the plunger on the syringe to gently push any remaining formula into the tube. ? If using a gravity bag: Connect the bag to the tube, and add the formula to the bag. Hang the bag on a hook or pole about 18 inches above the stomach. Depending on the type of formula, the food may take a few hours to flow through the tube. Ask your doctor what you can expect and how long it should take. ? If using a bag and pump, follow the instructions that come with the pump. · Flush the tube with warm water before and after feedings or before and after giving medicines through the tube. You can use a syringe to push water through the tube. · Sit up or keep your head up during the feeding and for 30 to 60 minutes after. · If you feel sick to your stomach or have stomach cramps during the feeding, slow the rate that the formula comes through the tube. Then slowly increase the rate as you can manage it. · Keep the formula in the refrigerator after you open it.  Follow your doctor's instructions about how long formula can sit out at room temperature. Throw away any open cans of food after 24 hours, even if they have been refrigerated. · Talk with your doctor about changing your feedings or medicines if you are having problems with diarrhea, constipation, or vomiting. How do you care for a feeding tube? · Keep it clean. That's the most important thing you need to know about caring for your tube. Flush the tube with warm water before and after feedings or giving medicines. You can use a syringe to push water through the tube. Clean the end (opening) of the tube every day with an antiseptic wipe. · Always wash your hands before touching the tube. · Tape the tube to your body so the end is facing up. Look for medical tape in your local drugstore. It may irritate your skin less than other types of tape. Change the position of the tape every few days. · Clamp the tube when you're not using it. Put the clamp close to your body so that food and liquids don't run down the tube. · Keep the skin around the tube clean and dry. How do you avoid problems with a feeding tube? Blocked tube. A blocked tube can happen when the tube isn't flushed or when formula or medicines are too thick. · Prevent blockage by flushing the tube with warm water before and after using the tube. · If the tube is blocked, try to clear it by flushing the tube. Call your doctor if the tube won't clear. · Don't use a wire or anything else to try to unclog a tube. A wire can poke a hole in the tube. Tube falls out. Don't try to put the tube back in by yourself. Call your doctor right away. The tube needs to be replaced before the opening in your belly closes, which can happen within hours. Leaking tube. A tube that leaks may be blocked, or it may not fit right. After checking the tube and flushing it to make sure that the tube isn't blocked, call your doctor. Where can you learn more?   Go to http://www.gray.com/  Enter F416 in the search box to learn more about \"Learning About Living With a Feeding Tube. \"  Current as of: December 17, 2020               Content Version: 13.0  © 9930-9482 [employer], [facility]. Care instructions adapted under license by [employer] (which disclaims liability or warranty for this information). If you have questions about a medical condition or this instruction, always ask your healthcare professional. [place] [address] any warranty or liability for your use of this information.

## 2022-02-11 NOTE — Clinical Note
NOTIFICATION RETURN TO WORK / SCHOOL    2/11/2022 12:31 PM    Ms. Malloy [phone]      To Whom It May Concern:    Barbra Baron is currently under the care of [facility]. She will return to work/school on: ***    If there are questions or concerns please have the patient contact our office.         Sincerely,      Sylvia Christian NP

## 2022-02-11 NOTE — LETTER
NOTIFICATION OF RETURN TO WORK / SCHOOL    2/11/2022    Ms. Malloy [phone]      To Whom It May Concern:    All Sanchez is  under the care of [address] [facility]. she was in office today please excuse from work. If there are questions or concerns please have the patient contact our office.         Sincerely,      Robert Turpin NP

## 2022-02-21 ENCOUNTER — HOSPITAL ENCOUNTER (OUTPATIENT)
Dept: PREADMISSION TESTING | Age: 42
Discharge: HOME | End: 2022-02-21
Payer: COMMERCIAL

## 2022-02-21 ENCOUNTER — HOSPITAL ENCOUNTER (OUTPATIENT)
Dept: GENERAL RADIOLOGY | Age: 42
Discharge: HOME | End: 2022-02-21
Attending: SURGERY
Payer: COMMERCIAL

## 2022-02-21 VITALS
DIASTOLIC BLOOD PRESSURE: 53 MMHG | TEMPERATURE: 98.2 F | WEIGHT: 174.16 LBS | HEIGHT: 66 IN | HEART RATE: 62 BPM | RESPIRATION RATE: 18 BRPM | SYSTOLIC BLOOD PRESSURE: 92 MMHG | BODY MASS INDEX: 27.99 KG/M2

## 2022-02-21 LAB
ALBUMIN SERPL-MCNC: 3.2 G/DL (ref 3.5–5)
ALBUMIN/GLOB SERPL: 0.9 {RATIO} (ref 1.1–2.2)
ALP SERPL-CCNC: 75 U/L (ref 45–117)
ALT SERPL-CCNC: 84 U/L (ref 12–78)
ANION GAP SERPL CALC-SCNC: 5 MMOL/L (ref 5–15)
AST SERPL-CCNC: 42 U/L (ref 15–37)
ATRIAL RATE: 61 BPM
BILIRUB SERPL-MCNC: 0.4 MG/DL (ref 0.2–1)
BUN SERPL-MCNC: 16 MG/DL (ref 6–20)
BUN/CREAT SERPL: 23 (ref 12–20)
CALCIUM SERPL-MCNC: 8.6 MG/DL (ref 8.5–10.1)
CALCULATED P AXIS, ECG09: 73 DEGREES
CALCULATED R AXIS, ECG10: 50 DEGREES
CALCULATED T AXIS, ECG11: 49 DEGREES
CHLORIDE SERPL-SCNC: 109 MMOL/L (ref 97–108)
CO2 SERPL-SCNC: 24 MMOL/L (ref 21–32)
CREAT SERPL-MCNC: 0.7 MG/DL (ref 0.55–1.02)
DIAGNOSIS, 93000: NORMAL
ERYTHROCYTE [DISTWIDTH] IN BLOOD BY AUTOMATED COUNT: 15.5 % (ref 11.5–14.5)
EST. AVERAGE GLUCOSE BLD GHB EST-MCNC: 108 MG/DL
FOLATE SERPL-MCNC: 5.4 NG/ML (ref 5–21)
GLOBULIN SER CALC-MCNC: 3.6 G/DL (ref 2–4)
GLUCOSE SERPL-MCNC: 58 MG/DL (ref 65–100)
HBA1C MFR BLD: 5.4 % (ref 4–5.6)
HCT VFR BLD AUTO: 35 % (ref 35–47)
HGB BLD-MCNC: 10.7 G/DL (ref 11.5–16)
IRON SATN MFR SERPL: 4 % (ref 20–50)
IRON SERPL-MCNC: 16 UG/DL (ref 35–150)
MCH RBC QN AUTO: 24.8 PG (ref 26–34)
MCHC RBC AUTO-ENTMCNC: 30.6 G/DL (ref 30–36.5)
MCV RBC AUTO: 81.2 FL (ref 80–99)
NRBC # BLD: 0 K/UL (ref 0–0.01)
NRBC BLD-RTO: 0 PER 100 WBC
P-R INTERVAL, ECG05: 166 MS
PLATELET # BLD AUTO: 188 K/UL (ref 150–400)
PMV BLD AUTO: 13 FL (ref 8.9–12.9)
POTASSIUM SERPL-SCNC: 4.2 MMOL/L (ref 3.5–5.1)
PREALB SERPL-MCNC: 22.7 MG/DL (ref 20–40)
PROT SERPL-MCNC: 6.8 G/DL (ref 6.4–8.2)
Q-T INTERVAL, ECG07: 370 MS
QRS DURATION, ECG06: 82 MS
QTC CALCULATION (BEZET), ECG08: 372 MS
RBC # BLD AUTO: 4.31 M/UL (ref 3.8–5.2)
SODIUM SERPL-SCNC: 138 MMOL/L (ref 136–145)
TIBC SERPL-MCNC: 423 UG/DL (ref 250–450)
TSH SERPL DL<=0.05 MIU/L-ACNC: 0.82 UIU/ML (ref 0.36–3.74)
VENTRICULAR RATE, ECG03: 61 BPM
VIT B12 SERPL-MCNC: 831 PG/ML (ref 193–986)
WBC # BLD AUTO: 3.9 K/UL (ref 3.6–11)

## 2022-02-21 PROCEDURE — 85027 COMPLETE CBC AUTOMATED: CPT

## 2022-02-21 PROCEDURE — 84425 ASSAY OF VITAMIN B-1: CPT

## 2022-02-21 PROCEDURE — 84630 ASSAY OF ZINC: CPT

## 2022-02-21 PROCEDURE — 84443 ASSAY THYROID STIM HORMONE: CPT

## 2022-02-21 PROCEDURE — 86677 HELICOBACTER PYLORI ANTIBODY: CPT

## 2022-02-21 PROCEDURE — 82746 ASSAY OF FOLIC ACID SERUM: CPT

## 2022-02-21 PROCEDURE — 71046 X-RAY EXAM CHEST 2 VIEWS: CPT

## 2022-02-21 PROCEDURE — 82607 VITAMIN B-12: CPT

## 2022-02-21 PROCEDURE — 82525 ASSAY OF COPPER: CPT

## 2022-02-21 PROCEDURE — 93005 ELECTROCARDIOGRAM TRACING: CPT

## 2022-02-21 PROCEDURE — 80053 COMPREHEN METABOLIC PANEL: CPT

## 2022-02-21 PROCEDURE — 84590 ASSAY OF VITAMIN A: CPT

## 2022-02-21 PROCEDURE — 82652 VIT D 1 25-DIHYDROXY: CPT

## 2022-02-21 PROCEDURE — 84134 ASSAY OF PREALBUMIN: CPT

## 2022-02-21 PROCEDURE — 83036 HEMOGLOBIN GLYCOSYLATED A1C: CPT

## 2022-02-21 PROCEDURE — 83540 ASSAY OF IRON: CPT

## 2022-02-21 RX ORDER — VALACYCLOVIR HYDROCHLORIDE 500 MG/1
TABLET, FILM COATED ORAL DAILY
COMMUNITY
End: 2022-06-01 | Stop reason: SDUPTHER

## 2022-02-21 NOTE — PERIOP NOTES
[facility]'S  BARIATRIC PREOPERATIVE INSTRUCTIONS    Surgery Date:   [facility] 23, 2022    [facility] staff will call you between 4 PM- 8 PM the day before surgery with your arrival time. If your surgery is on a Monday, we will call you the preceding Friday. Please call [phone] after 8 PM if you did not receive your arrival time. 1. Please report to [facility] Patient Access/Admitting on the 1st floor. Bring your insurance card, photo identification, and any copayment ( if applicable). 2. If you are going home the same day of your surgery, you must have a responsible adult to drive you home. You need to have a responsible adult to stay with you the first 24 hours after surgery and you should not drive a car for 24 hours following your surgery. 3. Do NOT eat any solid foods after midnight the night before surgery including candy, mint or gum. You may drink clear liquids from midnight until 1 hour prior to your arrival. You may drink up to 12 ounces at one time every 4 hours. Please note special instructions, if applicable, below for medications. 4. Do NOT drink alcohol or smoke 24 hours before surgery. STOP smoking for 14 days prior as it helps with breathing and healing after surgery. 5. If you are being admitted to the hospital, please leave personal belongings/luggage in your car until you have an assigned hospital room number. 6. Please wear comfortable clothes. Wear your glasses instead of contacts. We ask that all money, jewelry and valuables be left at home. Wear no make up, particularly mascara, the day of surgery. 7.  All body piercings, rings, and jewelry need to be removed and left at home. Please remove any nail polish or artifical nails from your fingernails. Please wear your hair loose or down. Please no pony-tails, buns, or any metal hair accessories. If you shower the morning of surgery, please do not apply any lotions or powders afterwards. You may wear deodorant.   Do not shave any body area within 24 hours of your surgery. 8. Please follow all instructions to avoid any potential surgical cancellation. 9. Should your physical condition change, (i.e. fever, cold, flu, etc.) please notify your surgeon as soon as possible. 10. It is important to be on time. If a situation occurs where you may be delayed, please call:  [phone] / [phone] on the day of surgery. 11. The Preadmission Testing staff can be reached at [phone]. 12. Special instructions: NONE      Current Outpatient Medications   Medication Sig    valACYclovir (VALTREX) 500 mg tablet Take  by mouth daily.  OTHER TUBE FEEDING OF \"2 CELINA\"  3 BOTTLES DAILY; AT HS    pantoprazole (PROTONIX) 40 mg tablet Take 1 Tablet by mouth daily for 30 days. Indications: marginal ulcer    sertraline (ZOLOFT) 50 mg tablet TAKE 1 TABLET BY MOUTH EVERY DAY    ondansetron (ZOFRAN ODT) 4 mg disintegrating tablet Take 1 Tablet by mouth every four to six (4-6) hours as needed for Nausea or Vomiting for up to 60 doses.  medroxyPROGESTERone (DEPO-PROVERA) 150 mg/mL syrg 1 mL by IntraMUSCular route every three (3) months. No current facility-administered medications for this encounter. 1. YOU MUST ONLY TAKE THESE MEDICATIONS THE MORNING OF SURGERY WITH A SIP OF WATER: VALACYCLOVIR, PROTONIX, ZOLOFT  2. MEDICATIONS TO TAKE THE MORNING OF SURGERY ONLY IF NEEDED: ZOFRAN  3. HOLD these prescription medications BEFORE Surgery: NONE  4. Ask your surgeon/prescribing physician about when/if to STOP taking these medications: TUBE FEEDING THE NIGHT BEFORE SURGERY  5. Stop all vitamins, herbal medicines, Aspirin containing products and any non-steroidal anti-inflammatory drugs (i.e. Ibuprofen, Naproxen, Advil, Aleve) 7 days prior to surgery. You may take Tylenol. 6. If you are currently taking Plavix, Coumadin, or any other blood-thinning/anticoagulant medication contact your prescribing physician for instructions.     Preventing Infections Before and After - Your Surgery    IMPORTANT INSTRUCTIONS    Please read and follow these instructions carefully. If you are unable to comply with the below instructions your procedure will be cancelled. You play an important role in your health and preparation for surgery. To reduce the germs on your skin you will need to shower with CHG soap (Chorhexidine gluconate 4%) two times before surgery. CHG soap (Hibiclens, Hex-A-Clens or store brand)   CHG soap will be provided at your Preadmission Testing (PAT) appointment.  If you do not have a PAT appointment before surgery, you may arrange to  CHG soap from our office or purchase CHG soap at a pharmacy, grocery or department store.  You need to purchase TWO 4 ounce bottles to use for your 2 showers. Steps to follow:  1. Wash your hair with your normal shampoo and your body with regular soap and rinse well to remove shampoo and soap from your skin. 2. Wet a clean washcloth and turn off the shower. 3. Put CHG soap on washcloth and apply to your entire body from the neck down. Do not use on your head, face or private parts(genitals). Do not use CHG soap on open sores, wounds or areas of skin irritation. 4. Wash you body gently for 5 minutes. Do not wash your skin too hard. This soap does not create lather. Pay special attention to your underarms and from your belly button to your feet. 5. Turn the shower back on and rinse well to get CHG soap off your body. 6. Pat your skin dry with a clean, dry towel. Do not apply lotions or moisturizer. 7. Put on clean clothes and sleep on fresh bed sheets and do not allow pets to sleep with you. Shower with CHG soap 2 times before your surgery   The evening before your surgery   The morning of your surgery      Tips to help prevent infections after your surgery:  1.  Protect your surgical wound from germs:  ? Hand washing is the most important thing you and your caregivers can do to prevent infections. ? Keep your bandage clean and dry! ? Do not touch your surgical wound. 2. Use clean, freshly washed towels and washcloths every time you shower; do not share bath linens with others. 3. Until your surgical wound is healed, wear clothing and sleep on bed linens each day that are clean and freshly washed. 4. Do not allow pets to sleep in your bed with you or touch your surgical wound. 5. Do not smoke - smoking delays wound healing. This may be a good time to stop smoking. 6. If you have diabetes, it is important for you to manage your blood sugar levels properly before your surgery as well as after your surgery. Poorly managed blood sugar levels slow down wound healing and prevent you from healing completely. Eating and Drinking Before Bariatric Surgery     You may eat a regular dinner at the usual time on the day before your surgery.  Do NOT eat any solid foods after midnight.  You may drink clear liquids only from 12 midnight until 1 hours prior to your arrival time at the hospital on the day of your surgery. Do NOT drink alcohol.  Clear liquids include:  o Water  o Flavored, sugar-free water  o Crystal Light, [place] or sugar free generic  o Sugar-free Luis Antonio-Aid or generic  o Decaffeinated tea or coffee  o Vitamin Water Zero  o Powerade Zero  o Gatorade Zero  o Clear Protein drinks  o Diet V-8 Splash  o Diet Snapple  o Diet Lemonade   You may drink up to 12 ounces at one time every 4 hours between the hours of midnight and 1 hour before your arrival time at the hospital. Example- if your arrival time at the hospital is 6am, you may drink 12 ounces of clear liquids no later than 5am.   If you have any questions, please contact your surgeon's office.        [facility]   Instructions for Pre-Surgery COVID-19 Testing     Across our ministry we have established standard guidelines to ensure the health and safety of our patients, residents and associates as we resume elective services for patients. All patients presenting for surgery are required to have a COVID-19 test result within 96 hours of their scheduled surgery. Ingrid Monaco is providing this test free of charge to the patient. Instructions for COVID-19 Testing:     Patients will receive a call from Pre-Admission Testing 4-5 days prior to surgery to schedule a date and time to come to the [address] [place] [address] Drive for their COVID-19 test   Patients are advised to self-quarantine after testing until their scheduled surgery   Once on site, patients will be registered and receive COVID test in their vehicle   If a patient is scheduled for normal Pre Admission Testing 96 hours from date of surgery, the patient will still have their COVID test done at the [address] [place] [address] located at [address] [place] [address] Positive results will be shared with the surgeon and anesthesiologist and may result in cancellation of the elective procedure    Testing Hours and Location:   Address:  [address] [place] [address] Up Pre Admission [facility] in the Discharge Lot [facility] (Map Attached)  [address], [address]   Hours: Monday- Friday 7a-3p    PAT Phone Number: [phone]            Patient Information Regarding COVID Restrictions    Patients are advised to self-quarantine after COVID testing up to the day of the scheduled procedure. Day of Procedure     Please park in the parking deck or any designated visitor parking lot.  Enter the facility through the Main Entrance of the hospital.   A temperature check and appropriate symptom/exposure screening will be done prior to entry to the facility.  On the day of surgery, please provide the cell phone number of the person who will be waiting for you to the Patient Access representative at the time of registration.  Please wear a mask on the day of your procedure.  We are now allowing one designated visitor per stay. Pediatric patients may have 2 designated visitors. This one person may come in with you on the day of your procedure.  No visitors under the age of 13.  The designated visitor must also wear a mask.  Once your procedure and the immediate recovery period is completed, a nurse in the recovery area will contact your designated visitor to inform them of your room number or to otherwise review other pertinent information regarding your care.  Social distancing practices are to be adhered to in waiting areas and the cafeteria. The patient was contacted in person. She verbalized understanding of all instructions does not need reinforcement.

## 2022-02-21 NOTE — PERIOP NOTES
[address] February 21, 2022       RE: Ziggy Moreno      To Whom It May Concern,    This is to certify that Ziggy Mayer had an appointment today; her daughter Marlon Archuleta was present. Please feel free to contact my office if you have any questions or concerns. Thank you for your assistance in this matter.       Sincerely,  Nadia Green RN   [phone]

## 2022-02-22 ENCOUNTER — DOCUMENTATION ONLY (OUTPATIENT)
Dept: SURGERY | Age: 42
End: 2022-02-22

## 2022-02-22 DIAGNOSIS — K91.2 POSTSURGICAL NONABSORPTION: ICD-10-CM

## 2022-02-22 DIAGNOSIS — E61.1 IRON DEFICIENCY: ICD-10-CM

## 2022-02-22 DIAGNOSIS — Z98.84 S/P GASTRIC BYPASS: Primary | ICD-10-CM

## 2022-02-22 LAB
1,25(OH)2D3 SERPL-MCNC: 59 PG/ML (ref 19.9–79.3)
H PYLORI IGA SER-ACNC: <9 UNITS (ref 0–8.9)
H PYLORI IGG SER IA-ACNC: 0.14 INDEX VALUE (ref 0–0.79)
ZINC BLD-MCNC: 547 UG/DL (ref 440–860)

## 2022-02-22 NOTE — PERIOP NOTES
Notes sent via cc to Chiquis at Dr. Mariama Minor office as follows: Vit K sample needed to be protected from light so it is not usable per lab. Since we don't usually draw these in PAT we were not aware it needed to be protected from light. Does Dr. Gavin Diaz want pt to return to [facility] to repeat Vit K? If so, please call me at [phone] and let me know. You may leave a message on the answering machine.       Abnormal labs:    Iron 16    Prealbumin 18.5

## 2022-02-22 NOTE — PROGRESS NOTES
New patient appointment request faxed to Dr. Neftali Nascimento office confirmation received. Office notes and labs attached.

## 2022-02-23 ENCOUNTER — TELEPHONE (OUTPATIENT)
Dept: SURGERY | Age: 42
End: 2022-02-23

## 2022-02-23 NOTE — PERIOP NOTES
SPOKE CRISTIN DEY IN DR. LUEVANO'S OFFICE AND PT DOESN'T HAVE TO COME BACK TO PAT TO GET VIT. K  REDRAWN.

## 2022-02-23 NOTE — TELEPHONE ENCOUNTER
----- Message from Mauro Gordillo MD sent at 2/23/2022 11:28 AM EST -----  Regarding: RE: abnormal labs in PAT and Vit K needs to be redrawn  Does not need to come back for this. Thanks  ----- Message -----  From: Korin Reid LPN  Sent: 6/86/4126  10:53 AM EST  To: Mauro Gordillo MD  Subject: FW: abnormal labs in PAT and Vit K needs to #      ----- Message -----  From: Santi Castrejon RN  Sent: 2/22/2022  10:58 AM EST  To: Fawn Smith LPN  Subject: abnormal labs in PAT and Vit K needs to be r#    Allyn Sim,    Vit K sample needed to be protected from light so it is not usable per lab. Since we don't usually draw these in [facility] we were not aware it needed to be protected from light. Does Dr. Johanna Vance want pt to return to [facility] to repeat Vit K? If so, please call me at [phone] and let me know. You may leave a message on the answering machine.       Abnormal labs:    Iron 16    Prealbumin 18.5    Thank you,  Mitchell Yoo RN

## 2022-02-24 NOTE — PROGRESS NOTES
Spoke with patient regarding low iron levels. Patient instructed to follow up hematologist for iron transfusions.   Patient agreed and will call to make an appt asap

## 2022-02-28 ENCOUNTER — PATIENT MESSAGE (OUTPATIENT)
Dept: SURGERY | Age: 42
End: 2022-02-28

## 2022-02-28 LAB — VIT A SERPL-MCNC: 32.2 UG/DL (ref 20.1–62)

## 2022-03-01 ENCOUNTER — TELEPHONE (OUTPATIENT)
Dept: SURGERY | Age: 42
End: 2022-03-01

## 2022-03-01 LAB
Lab: NORMAL
REFERENCE LAB,REFLB: NORMAL
TEST DESCRIPTION:,ATST: NORMAL

## 2022-03-01 NOTE — TELEPHONE ENCOUNTER
Patient called in stating she wants to speak with Chiquis MICHELLE. She states her feeding tube is hurting and looks to be protruding. She states she has gained weight which she thinks could be causing this issue. Please advise.   CB: [phone]

## 2022-03-01 NOTE — TELEPHONE ENCOUNTER
Spoke with patient. She is going to send pics via [employer]. MD made aware of c/o and will be on the look out for pics.  Patient scheduled to see MD on Monday 3/7/22

## 2022-03-04 LAB — VIT B1 BLD-SCNC: 108.6 NMOL/L (ref 66.5–200)

## 2022-03-07 ENCOUNTER — OFFICE VISIT (OUTPATIENT)
Dept: SURGERY | Age: 42
End: 2022-03-07

## 2022-03-07 ENCOUNTER — TELEPHONE (OUTPATIENT)
Dept: SURGERY | Age: 42
End: 2022-03-07

## 2022-03-07 VITALS
OXYGEN SATURATION: 98 % | HEART RATE: 85 BPM | SYSTOLIC BLOOD PRESSURE: 104 MMHG | DIASTOLIC BLOOD PRESSURE: 63 MMHG | WEIGHT: 168 LBS | BODY MASS INDEX: 27 KG/M2 | HEIGHT: 66 IN

## 2022-03-07 DIAGNOSIS — K28.9 MARGINAL ULCER: Primary | ICD-10-CM

## 2022-03-07 PROCEDURE — 99024 POSTOP FOLLOW-UP VISIT: CPT | Performed by: SURGERY

## 2022-03-07 RX ORDER — SERTRALINE HYDROCHLORIDE 50 MG/1
50 TABLET, FILM COATED ORAL DAILY
Qty: 30 TABLET | Refills: 0 | Status: SHIPPED | OUTPATIENT
Start: 2022-03-07 | End: 2022-03-31 | Stop reason: SDUPTHER

## 2022-03-07 RX ORDER — SERTRALINE HYDROCHLORIDE 50 MG/1
TABLET, FILM COATED ORAL
Qty: 30 TABLET | Refills: 0 | OUTPATIENT
Start: 2022-03-07

## 2022-03-07 NOTE — PROGRESS NOTES
1. Have you been to the ER, urgent care clinic since your last visit? Hospitalized since your last visit? No    2. Have you seen or consulted any other health care providers outside of the [address] [place] [address] since your last visit? Include any pap smears or colon screening.  No

## 2022-03-07 NOTE — TELEPHONE ENCOUNTER
Pt says she needs her Zoloft refilled ASAP. Her supervisor would not let her go to the appt today. Did not reschedule. Would like a call back about refill please.

## 2022-03-07 NOTE — TELEPHONE ENCOUNTER
Patient told another PSR(Negin Wilkinson) her boss would not let her off for her appt today. Contacted patient to make her aware that she missed her appointment with Dr. Kalina Patel today. Will send no show letter.

## 2022-03-07 NOTE — PROGRESS NOTES
To whom it may concern:    Ms. Dea Garg was seen at [facility] in [place], [place] on 3/7/22 for an appointment. She has a complex abdominal surgical problem that is slowly improving. Her prognosis for full recovery is good. Please excuse her from work for the afternoon of 3/7. If there are any questions, please feel free to call our office. Sincerely,          Yoni Lyle MD    Bariatric and General Surgeon  [facility]   [address] [place] [address], [address] [place] [address].  [address] [place] [address]: [phone]   F: [phone]

## 2022-03-07 NOTE — TELEPHONE ENCOUNTER
Called patient back. Advised rx has been sent. She is still coming in today to see Dr. Daniel Zimmerman. Appt moved to pm due to her work schedule.

## 2022-03-07 NOTE — PROGRESS NOTES
Surgery Progress Note    3/7/2022    CC: Ulcer    Subjective:     Patient feels great today. She had been doing her nocturnal feeds and eating a variety of solid foods including crab cakes and steak. She thinks that her ulcer has resolved. She been taking PPI. Doing nocturnal feeds with protein. She is here to discuss possible reconsideration of surgery. Constitutional: No fever or chills  Neurologic: No headache  Eyes: No scleral icterus or irritated eyes  Nose: No nasal pain or drainage  Mouth: No oral lesions or sore throat  Cardiac: No palpations or chest pain  Pulmonary: No cough or shortness of breath  Gastrointestinal: No nausea, emesis, diarrhea, or constipation  Genitourinary: No dysuria  Musculoskeletal: No muscle or joint tenderness  Skin: No rashes or lesions  Psychiatric: No anxiety or depressed mood    Objective:     Visit Vitals  /63 (BP 1 Location: Left upper arm, BP Patient Position: Sitting, BP Cuff Size: Adult)   Pulse 85   Ht 5' 6\" (1.676 m)   Wt 168 lb (76.2 kg)   SpO2 98%   BMI 27.12 kg/m²       General: No acute distress, conversant  Eyes: PERRLA, no scleral icterus  HENT: Normocephalic without oral lesions  Neck: Trachea midline without LAD  Cardiac: Normal pulse rate and rhythm  Pulmonary: Symmetric chest rise with normal effort  GI: Soft, NT, ND, no hernia, no splenomegaly, G-tube site clean dry and intact  Skin: Warm without rash  Extremities: No edema or joint stiffness  Psych: Appropriate mood and affect    Assessment:     59-year-old female with history of gastric bypass and marginal ulcer being considered for GJ revision    Plan:     I agree, the patient is improving nicely. I have encouraged her to continue her G-tube feeds and encourage p.o. She will see her gastroenterologist and consider possible repeat EGD and possible dilation. If he does not feel okay with dilation we will have to consider one of the guys at [facility].   We will keep surgery on the books for 23rd but may have to cancel secondary to her great improvement. She will contact us with whether or not her GI doctor can perform dilation. Work note given.     Gigi Segal MD  Bariatric and General Surgeon  [facility]

## 2022-03-08 NOTE — TELEPHONE ENCOUNTER
Returned call to Ms Daryl Ndiaye verified all PHI. Ms Daryl Ndiaye is stated she needs protein for her feeding tube. She stated she will call [employer] instead and disregard.

## 2022-03-15 ENCOUNTER — DOCUMENTATION ONLY (OUTPATIENT)
Dept: SURGERY | Age: 42
End: 2022-03-15

## 2022-03-15 NOTE — PROGRESS NOTES
Prior auth request received from [facility] meds for pantoprazole 40 mg submitted and approved.   Key: [id number] - PA Case ID: [id number] - Rx #: [id number]

## 2022-03-18 PROBLEM — R13.10 DYSPHAGIA: Status: ACTIVE | Noted: 2021-04-07

## 2022-03-18 PROBLEM — D50.9 MICROCYTIC ANEMIA: Status: ACTIVE | Noted: 2021-04-07

## 2022-03-18 PROBLEM — K28.9 MARGINAL ULCER: Status: ACTIVE | Noted: 2021-04-08

## 2022-03-19 PROBLEM — L72.0 EPIDERMOID CYST OF NECK: Status: ACTIVE | Noted: 2021-05-10

## 2022-03-19 PROBLEM — K28.9 GJU (GASTROJEJUNAL ULCER): Status: ACTIVE | Noted: 2022-01-03

## 2022-03-19 PROBLEM — E55.9 VITAMIN D DEFICIENCY: Status: ACTIVE | Noted: 2021-04-07

## 2022-03-19 PROBLEM — K92.2 UPPER GASTROINTESTINAL HEMORRHAGE: Status: ACTIVE | Noted: 2022-01-04

## 2022-03-19 PROBLEM — E43 SEVERE PROTEIN-CALORIE MALNUTRITION (HCC): Status: ACTIVE | Noted: 2022-01-04

## 2022-03-20 PROBLEM — Q17.8 SPLIT EAR LOBE: Status: ACTIVE | Noted: 2021-05-10

## 2022-03-28 ENCOUNTER — TELEPHONE (OUTPATIENT)
Dept: SURGERY | Age: 42
End: 2022-03-28

## 2022-03-28 NOTE — TELEPHONE ENCOUNTER
Patient called in wanting to speak to Dr. German Meckel. She was rushed to the hospital by ambulance because she blacked out at work from the pain of her feeding tube. She is wanting Dr. German Meckel to call the hospital she is at to have them release her.    CB: [phone]

## 2022-03-28 NOTE — TELEPHONE ENCOUNTER
Patient was returning call; however, Corazon Conrad was assisting another patient.  I informed patient they will receive a call back later today

## 2022-03-30 ENCOUNTER — ANESTHESIA EVENT (OUTPATIENT)
Dept: SURGERY | Age: 42
End: 2022-03-30
Payer: COMMERCIAL

## 2022-03-30 NOTE — PERIOP NOTES
PATIENT CONFIRMS PAT PHONE INTERVIEW COMPLETED 3/28/22 AND WAS GIVEN INSTRUCTIONS. NO CHANGE IN HISTORY OR MEDICATIONS. INSTRUCTED TO REPORT TO [address]

## 2022-03-31 ENCOUNTER — HOSPITAL ENCOUNTER (OUTPATIENT)
Age: 42
Setting detail: OUTPATIENT SURGERY
Discharge: HOME | End: 2022-03-31
Attending: SURGERY | Admitting: SURGERY
Payer: COMMERCIAL

## 2022-03-31 ENCOUNTER — ANESTHESIA (OUTPATIENT)
Dept: SURGERY | Age: 42
End: 2022-03-31
Payer: COMMERCIAL

## 2022-03-31 VITALS
HEIGHT: 66 IN | SYSTOLIC BLOOD PRESSURE: 105 MMHG | WEIGHT: 163 LBS | DIASTOLIC BLOOD PRESSURE: 68 MMHG | HEART RATE: 65 BPM | BODY MASS INDEX: 26.2 KG/M2 | RESPIRATION RATE: 11 BRPM | OXYGEN SATURATION: 98 % | TEMPERATURE: 98.2 F

## 2022-03-31 LAB — HCG UR QL: NEGATIVE

## 2022-03-31 PROCEDURE — 81025 URINE PREGNANCY TEST: CPT

## 2022-03-31 PROCEDURE — C1726 CATH, BAL DIL, NON-VASCULAR: HCPCS | Performed by: SURGERY

## 2022-03-31 PROCEDURE — 74011000250 HC RX REV CODE- 250: Performed by: NURSE ANESTHETIST, CERTIFIED REGISTERED

## 2022-03-31 PROCEDURE — 76210000006 HC OR PH I REC 0.5 TO 1 HR: Performed by: SURGERY

## 2022-03-31 PROCEDURE — 77030008477 HC STYL SATN SLP COVD -A: Performed by: ANESTHESIOLOGY

## 2022-03-31 PROCEDURE — 2709999900 HC NON-CHARGEABLE SUPPLY: Performed by: SURGERY

## 2022-03-31 PROCEDURE — 76210000020 HC REC RM PH II FIRST 0.5 HR: Performed by: SURGERY

## 2022-03-31 PROCEDURE — 43245 EGD DILATE STRICTURE: CPT | Performed by: SURGERY

## 2022-03-31 PROCEDURE — 77030008684 HC TU ET CUF COVD -B: Performed by: ANESTHESIOLOGY

## 2022-03-31 PROCEDURE — 76010000138 HC OR TIME 0.5 TO 1 HR: Performed by: SURGERY

## 2022-03-31 PROCEDURE — 74011250636 HC RX REV CODE- 250/636: Performed by: NURSE ANESTHETIST, CERTIFIED REGISTERED

## 2022-03-31 PROCEDURE — 76060000032 HC ANESTHESIA 0.5 TO 1 HR: Performed by: SURGERY

## 2022-03-31 RX ORDER — LIDOCAINE HYDROCHLORIDE 10 MG/ML
0.1 INJECTION, SOLUTION EPIDURAL; INFILTRATION; INTRACAUDAL; PERINEURAL AS NEEDED
Status: DISCONTINUED | OUTPATIENT
Start: 2022-03-31 | End: 2022-03-31 | Stop reason: HOSPADM

## 2022-03-31 RX ORDER — SERTRALINE HYDROCHLORIDE 50 MG/1
50 TABLET, FILM COATED ORAL DAILY
Qty: 90 TABLET | Refills: 0 | Status: SHIPPED | OUTPATIENT
Start: 2022-03-31 | End: 2022-05-26 | Stop reason: SDUPTHER

## 2022-03-31 RX ORDER — DIPHENHYDRAMINE HYDROCHLORIDE 50 MG/ML
INJECTION, SOLUTION INTRAMUSCULAR; INTRAVENOUS AS NEEDED
Status: DISCONTINUED | OUTPATIENT
Start: 2022-03-31 | End: 2022-03-31 | Stop reason: HOSPADM

## 2022-03-31 RX ORDER — SODIUM CHLORIDE, SODIUM LACTATE, POTASSIUM CHLORIDE, CALCIUM CHLORIDE 600; 310; 30; 20 MG/100ML; MG/100ML; MG/100ML; MG/100ML
25 INJECTION, SOLUTION INTRAVENOUS CONTINUOUS
Status: DISCONTINUED | OUTPATIENT
Start: 2022-03-31 | End: 2022-03-31 | Stop reason: HOSPADM

## 2022-03-31 RX ORDER — FENTANYL CITRATE 50 UG/ML
INJECTION, SOLUTION INTRAMUSCULAR; INTRAVENOUS AS NEEDED
Status: DISCONTINUED | OUTPATIENT
Start: 2022-03-31 | End: 2022-03-31 | Stop reason: HOSPADM

## 2022-03-31 RX ORDER — SODIUM CHLORIDE 0.9 % (FLUSH) 0.9 %
5-40 SYRINGE (ML) INJECTION EVERY 8 HOURS
Status: DISCONTINUED | OUTPATIENT
Start: 2022-03-31 | End: 2022-03-31 | Stop reason: HOSPADM

## 2022-03-31 RX ORDER — SODIUM CHLORIDE 0.9 % (FLUSH) 0.9 %
5-40 SYRINGE (ML) INJECTION AS NEEDED
Status: DISCONTINUED | OUTPATIENT
Start: 2022-03-31 | End: 2022-03-31 | Stop reason: HOSPADM

## 2022-03-31 RX ORDER — DEXAMETHASONE SODIUM PHOSPHATE 4 MG/ML
INJECTION, SOLUTION INTRA-ARTICULAR; INTRALESIONAL; INTRAMUSCULAR; INTRAVENOUS; SOFT TISSUE AS NEEDED
Status: DISCONTINUED | OUTPATIENT
Start: 2022-03-31 | End: 2022-03-31 | Stop reason: HOSPADM

## 2022-03-31 RX ORDER — MIDAZOLAM HYDROCHLORIDE 1 MG/ML
INJECTION, SOLUTION INTRAMUSCULAR; INTRAVENOUS AS NEEDED
Status: DISCONTINUED | OUTPATIENT
Start: 2022-03-31 | End: 2022-03-31 | Stop reason: HOSPADM

## 2022-03-31 RX ORDER — ONDANSETRON 2 MG/ML
INJECTION INTRAMUSCULAR; INTRAVENOUS AS NEEDED
Status: DISCONTINUED | OUTPATIENT
Start: 2022-03-31 | End: 2022-03-31 | Stop reason: HOSPADM

## 2022-03-31 RX ORDER — ROCURONIUM BROMIDE 10 MG/ML
INJECTION, SOLUTION INTRAVENOUS AS NEEDED
Status: DISCONTINUED | OUTPATIENT
Start: 2022-03-31 | End: 2022-03-31 | Stop reason: HOSPADM

## 2022-03-31 RX ORDER — SUCCINYLCHOLINE CHLORIDE 20 MG/ML
INJECTION INTRAMUSCULAR; INTRAVENOUS AS NEEDED
Status: DISCONTINUED | OUTPATIENT
Start: 2022-03-31 | End: 2022-03-31 | Stop reason: HOSPADM

## 2022-03-31 RX ORDER — PROPOFOL 10 MG/ML
INJECTION, EMULSION INTRAVENOUS AS NEEDED
Status: DISCONTINUED | OUTPATIENT
Start: 2022-03-31 | End: 2022-03-31 | Stop reason: HOSPADM

## 2022-03-31 RX ORDER — FENTANYL CITRATE 50 UG/ML
50 INJECTION, SOLUTION INTRAMUSCULAR; INTRAVENOUS AS NEEDED
Status: DISCONTINUED | OUTPATIENT
Start: 2022-03-31 | End: 2022-03-31 | Stop reason: HOSPADM

## 2022-03-31 RX ORDER — ONDANSETRON 2 MG/ML
4 INJECTION INTRAMUSCULAR; INTRAVENOUS AS NEEDED
Status: DISCONTINUED | OUTPATIENT
Start: 2022-03-31 | End: 2022-03-31 | Stop reason: HOSPADM

## 2022-03-31 RX ORDER — SODIUM CHLORIDE, SODIUM LACTATE, POTASSIUM CHLORIDE, CALCIUM CHLORIDE 600; 310; 30; 20 MG/100ML; MG/100ML; MG/100ML; MG/100ML
INJECTION, SOLUTION INTRAVENOUS
Status: DISCONTINUED | OUTPATIENT
Start: 2022-03-31 | End: 2022-03-31 | Stop reason: HOSPADM

## 2022-03-31 RX ORDER — LIDOCAINE HYDROCHLORIDE 20 MG/ML
INJECTION, SOLUTION EPIDURAL; INFILTRATION; INTRACAUDAL; PERINEURAL AS NEEDED
Status: DISCONTINUED | OUTPATIENT
Start: 2022-03-31 | End: 2022-03-31 | Stop reason: HOSPADM

## 2022-03-31 RX ORDER — FENTANYL CITRATE 50 UG/ML
25 INJECTION, SOLUTION INTRAMUSCULAR; INTRAVENOUS
Status: DISCONTINUED | OUTPATIENT
Start: 2022-03-31 | End: 2022-03-31 | Stop reason: HOSPADM

## 2022-03-31 RX ADMIN — PROPOFOL 50 MG: 10 INJECTION, EMULSION INTRAVENOUS at 10:43

## 2022-03-31 RX ADMIN — SUCCINYLCHOLINE CHLORIDE 100 MG: 20 INJECTION, SOLUTION INTRAMUSCULAR; INTRAVENOUS at 10:35

## 2022-03-31 RX ADMIN — MIDAZOLAM 2 MG: 1 INJECTION INTRAMUSCULAR; INTRAVENOUS at 10:28

## 2022-03-31 RX ADMIN — SODIUM CHLORIDE, SODIUM LACTATE, POTASSIUM CHLORIDE, AND CALCIUM CHLORIDE: 600; 310; 30; 20 INJECTION, SOLUTION INTRAVENOUS at 10:32

## 2022-03-31 RX ADMIN — FENTANYL CITRATE 25 MCG: 50 INJECTION, SOLUTION INTRAMUSCULAR; INTRAVENOUS at 10:43

## 2022-03-31 RX ADMIN — ROCURONIUM BROMIDE 5 MG: 10 SOLUTION INTRAVENOUS at 10:35

## 2022-03-31 RX ADMIN — PROPOFOL 150 MG: 10 INJECTION, EMULSION INTRAVENOUS at 10:35

## 2022-03-31 RX ADMIN — PROPOFOL 50 MG: 10 INJECTION, EMULSION INTRAVENOUS at 10:39

## 2022-03-31 RX ADMIN — FENTANYL CITRATE 25 MCG: 50 INJECTION, SOLUTION INTRAMUSCULAR; INTRAVENOUS at 11:12

## 2022-03-31 RX ADMIN — LIDOCAINE HYDROCHLORIDE 80 MG: 20 INJECTION, SOLUTION EPIDURAL; INFILTRATION; INTRACAUDAL; PERINEURAL at 10:35

## 2022-03-31 RX ADMIN — DIPHENHYDRAMINE HYDROCHLORIDE 6.25 MG: 50 INJECTION, SOLUTION INTRAMUSCULAR; INTRAVENOUS at 10:51

## 2022-03-31 RX ADMIN — DEXAMETHASONE SODIUM PHOSPHATE 4 MG: 4 INJECTION, SOLUTION INTRAMUSCULAR; INTRAVENOUS at 10:44

## 2022-03-31 RX ADMIN — ONDANSETRON HYDROCHLORIDE 4 MG: 2 INJECTION, SOLUTION INTRAMUSCULAR; INTRAVENOUS at 10:46

## 2022-03-31 RX ADMIN — FENTANYL CITRATE 50 MCG: 50 INJECTION, SOLUTION INTRAMUSCULAR; INTRAVENOUS at 11:11

## 2022-03-31 NOTE — TELEPHONE ENCOUNTER
Received fax from pharmacy requesting 90 day supply of the zoloft 50 mg tab. Request sent to Mellissa Vargas NP for review.

## 2022-03-31 NOTE — DISCHARGE INSTRUCTIONS
To resume regular diet    Continue daily PPI    Try to avoid tube feeds. Use it only for backup if needed. Try to eat and maintain her weight. Remember to chew well and sip. DISCHARGE SUMMARY from Nurse    PATIENT INSTRUCTIONS:    After general anesthesia or intravenous sedation, for 24 hours or while taking prescription Narcotics:  · Limit your activities  · Do not drive and operate hazardous machinery  · Do not make important personal or business decisions  · Do  not drink alcoholic beverages  · If you have not urinated within 8 hours after discharge, please contact your surgeon on call. Report the following to your surgeon:  · Excessive pain, swelling, redness or odor of or around the surgical area  · Temperature over 100.5  · Nausea and vomiting lasting longer than 4 hours or if unable to take medications  · Any signs of decreased circulation or nerve impairment to extremity: change in color, persistent  numbness, tingling, coldness or increase pain  · Any questions    What to do at Home:  Recommended activity: Activity as tolerated and no driving for today, NO NSAIDS (MOTRIN, IBUPOROPHEN, ALEVE  If you experience any of the following symptoms tempeture, vomiting or vomiting blood, severe abdominal pain, inability to urinate by 8 pm please follow up with Dr Nelson Amaya. *  Please give a list of your current medications to your Primary Care Provider. *  Please update this list whenever your medications are discontinued, doses are      changed, or new medications (including over-the-counter products) are added. *  Please carry medication information at all times in case of emergency situations. These are general instructions for a healthy lifestyle:    No smoking/ No tobacco products/ Avoid exposure to second hand smoke  Surgeon General's Warning:  Quitting smoking now greatly reduces serious risk to your health.     Obesity, smoking, and sedentary lifestyle greatly increases your risk for illness    A healthy diet, regular physical exercise & weight monitoring are important for maintaining a healthy lifestyle    You may be retaining fluid if you have a history of heart failure or if you experience any of the following symptoms:  Weight gain of 3 pounds or more overnight or 5 pounds in a week, increased swelling in our hands or feet or shortness of breath while lying flat in bed. Please call your doctor as soon as you notice any of these symptoms; do not wait until your next office visit.        ______________________________________________________________________    Anesthesia Discharge Instructions    After general anesthesia or intervenous sedation, for 24 hours or while taking prescription Narcotics:  · Limit your activities  · Do not drive or operate hazardous machinery  · If you have not urinated within 8 hours after discharge, please contact your surgeon on call. · Do not make important personal or business decisions  · Do not drink alcoholic beverages    Report the following to your surgeon:  · Excessive pain, swelling, redness or odor of or around the surgical area  · Temperature over 100.5 degrees  · Nausea and vomiting lasting longer than 4 hours or if unable to take medication  · Any signs of decreased circulation or nerve impairment to extremity:  Change in color, persistent numbness, tingling, coldness or increased pain.   · Any questions        ___________________________________________________________________________________________________________________________________

## 2022-03-31 NOTE — ANESTHESIA PREPROCEDURE EVALUATION
Relevant Problems   GASTROINTESTINAL   (+) GJU (gastrojejunal ulcer)      HEMATOLOGY   (+) Microcytic anemia       Anesthetic History   No history of anesthetic complications            Review of Systems / Medical History  Patient summary reviewed, nursing notes reviewed and pertinent labs reviewed    Pulmonary  Within defined limits                 Neuro/Psych         Psychiatric history     Cardiovascular  Within defined limits                Exercise tolerance: >4 METS     GI/Hepatic/Renal     GERD: poorly controlled      PUD and liver disease     Endo/Other  Within defined limits           Other Findings              Physical Exam    Airway  Mallampati: III  TM Distance: 4 - 6 cm  Neck ROM: normal range of motion   Mouth opening: Normal     Cardiovascular  Regular rate and rhythm,  S1 and S2 normal,  no murmur, click, rub, or gallop  Rhythm: regular  Rate: normal         Dental  No notable dental hx       Pulmonary  Breath sounds clear to auscultation               Abdominal  GI exam deferred       Other Findings            Anesthetic Plan    ASA: 3  Anesthesia type: general    Monitoring Plan: BIS      Induction: Intravenous  Anesthetic plan and risks discussed with: Patient

## 2022-03-31 NOTE — H&P
General Surgery History and Physical    CC: Dysphagia    History of Present Illness:      Galileo Rocha is a 39 y.o. female who presented with history of gastric bypass and marginal ulcer here to eval for stricture. Past Medical History:   Diagnosis Date    Anemia     Anxiety     GERD (gastroesophageal reflux disease)     Hepatitis C     treated    Hx of abnormal Pap smear     Ill-defined condition     anemia hx, has received several blood transfusion, last blood transfusion May 5 2014    Intestinal perforation ([address]     Psychiatric disorder     OCD    PUD (peptic ulcer disease)      Past Surgical History:   Procedure Laterality Date    HX GASTRIC BYPASS      HX GI  2009    perforated intestines    HX GYN       x 4    HX OTHER SURGICAL  2022    FEEDING TUBE      Family History   Problem Relation Age of Onset    Diabetes Maternal Grandmother     Anesth Problems Neg Hx      Social History     Socioeconomic History    Marital status: SINGLE   Tobacco Use    Smoking status: Former Smoker     Quit date: 2019     Years since quitting: 3.1    Smokeless tobacco: Never Used    Tobacco comment: DID NOT SMOKE DAILY   Vaping Use    Vaping Use: Never used   Substance and Sexual Activity    Alcohol use: No    Drug use: Yes     Types: Marijuana    Sexual activity: Yes     Partners: Male     Birth control/protection: None      Prior to Admission medications    Medication Sig Start Date End Date Taking? Authorizing Provider   pantoprazole (PROTONIX) 40 mg tablet Take 1 Tablet by mouth daily for 90 days. Indications: marginal ulcer 3/11/22 6/9/22 Yes Nimco Montiel NP   sertraline (ZOLOFT) 50 mg tablet Take 1 Tablet by mouth daily. 3/7/22  Yes Sincere Vega MD   valACYclovir (VALTREX) 500 mg tablet Take  by mouth daily.    Yes Provider, Historical   OTHER TUBE FEEDING OF \"2 CELINA\"  3 BOTTLES DAILY; AT HS   Yes Provider, Historical   ondansetron (ZOFRAN ODT) 4 mg disintegrating tablet Take 1 Tablet by mouth every four to six (4-6) hours as needed for Nausea or Vomiting for up to 60 doses. 1/17/22  Yes Phan Schneider MD   medroxyPROGESTERone (DEPO-PROVERA) 150 mg/mL syrg 1 mL by IntraMUSCular route every three (3) months. 8/18/21  Yes Blossom Lozano MD     Allergies   Allergen Reactions    Latex Hives and Itching    Codeine Itching and Other (comments)     MUST TAKE BENADRYL PRIOR TO TAKING       Review of Systems:  Constitutional: No fever or chills  Neurologic: No headache  Eyes: No scleral icterus or irritated eyes  Nose: No nasal pain or drainage  Mouth: No oral lesions or sore throat  Cardiac: No palpations or chest pain  Pulmonary: No cough or shortness of breath  Gastrointestinal: No nausea, emesis, diarrhea, or constipation  Genitourinary: No dysuria  Musculoskeletal: No muscle or joint tenderness  Skin: No rashes or lesions  Psychiatric: No anxiety or depressed mood    Physical Exam:   No data recorded. There were no vitals taken for this visit.   General: No acute distress, conversant  Eyes: PERRLA, no scleral icterus  HENT: Normocephalic without oral lesions  Neck: Trachea midline without LAD  Cardiac: Normal pulse rate and rhythm  Pulmonary: Symmetric chest rise with normal effort  GI: Soft, NT, ND, no hernia, no splenomegaly, G tube in place  Skin: Warm without rash  Extremities: No edema or joint stiffness  Psych: Appropriate mood and affect    Assessment:     40 y/o F with history of marginal ulcers and dysphagia    Plan:     NPO  Consent obtained  OR this AM for EGD and possible dilation      Signed By: Heron Chavez MD  Bariatric and General Surgeon  [facility]    March 31, 2022

## 2022-03-31 NOTE — PERIOP NOTES
I have reviewed discharge instructions with the patient and parent. The patient and parent verbalized understanding. Signs, symptoms, and side effects reviewed. Opportunity for questions and clarification allowed. Patient discharging home via private vehicle. Patient given hard copy of discharge instructions.

## 2022-03-31 NOTE — ANESTHESIA POSTPROCEDURE EVALUATION
Procedure(s):  ESOPHAGOGASTROJEJUNOSCOPY WITH DILATION. general    Anesthesia Post Evaluation      Multimodal analgesia: multimodal analgesia used between 6 hours prior to anesthesia start to PACU discharge  Patient location during evaluation: PACU  Patient participation: complete - patient participated  Level of consciousness: sleepy but conscious and responsive to verbal stimuli  Pain score: 2  Pain management: adequate  Airway patency: patent  Anesthetic complications: no  Cardiovascular status: acceptable  Respiratory status: acceptable  Hydration status: acceptable  Comments: +Post-Anesthesia Evaluation and Assessment    Patient: All Sanchez MRN: [id number]  SSN: [id number]   YOB: 1980  Age: 39 y.o. Sex: female      Cardiovascular Function/Vital Signs    BP (!) 100/49   Pulse 63   Temp 36.8 °C (98.2 °F)   Resp 11   Ht 5' 6\" (1.676 m)   Wt 73.9 kg (163 lb)   SpO2 98%   BMI 26.31 kg/m²     Patient is status post Procedure(s):  ESOPHAGOGASTROJEJUNOSCOPY WITH DILATION. Nausea/Vomiting: Controlled. Postoperative hydration reviewed and adequate. Pain:  Pain Scale 1: Numeric (0 - 10) (03/31/22 1142)  Pain Intensity 1: 3 (03/31/22 1142)   Managed. Neurological Status:   Neuro (WDL): Exceptions to WDL (03/31/22 1109)   At baseline. Mental Status and Level of Consciousness: Arousable. Pulmonary Status:   O2 Device: None (Room air) (03/31/22 1142)   Adequate oxygenation and airway patent. Complications related to anesthesia: None    Post-anesthesia assessment completed. No concerns.     Signed By: Lupe Quintana MD    3/31/2022  Post anesthesia nausea and vomiting:  controlled  Final Post Anesthesia Temperature Assessment:  Normothermia (36.0-37.5 degrees C)      INITIAL Post-op Vital signs:   Vitals Value Taken Time   /49 03/31/22 1145   Temp 36.8 °C (98.2 °F) 03/31/22 1130   Pulse 61 03/31/22 1153   Resp 11 03/31/22 1153   SpO2 100 % 03/31/22 1154   Vitals shown include unvalidated device data.

## 2022-03-31 NOTE — OP NOTES
[facility]  Procedure Note        NAME:  Ivan Kim   :   1980   MRN:   [id number]     Date/Time:  3/31/2022 11:32 AM    Esophagogastroduodenoscopy (EGD) Procedure Note    :  Raquel Anderson MD    Staff: Joy Still: Vinicius Olivares, RN  Endoscopy Technician-1: Magda Henderson  Scrub RN-1: Dat Huff RN     Implants: None    Referring Provider:  Unknown, Provider, NP    Anethesia/Sedation:  General anesthesia    Procedure Details     After infom consent was obtained for the procedure, with all risks and benefits of procedure explained the patient was taken to the endoscopy suite and placed in the left lateral decubitus position. Following sequential administration of sedation as per above, the gastroscope was inserted into the mouth and advanced under direct vision to proximal jejunum. A careful inspection was made as the gastroscope was withdrawn, including a retroflexed view of the proximal stomach; findings and interventions are described below. Findings:  Esophagus:normal  Stomach: Normal-appearing 8 cm gastric bypass pouch with Z-line at 40 cm, stoma at 48 cm, no marginal ulcer, stoma approximately 10 mm in size, could accommodate the gastroscope  Duodenum/jejunum: Normal-appearing proximal jejunum without candycane      Therapies: GJ dilation serially with CRE balloon from 12 mm to 15 mm with 1 minute holds per size at which point there was mucosal cracking    Specimens: None           EBL: None    Complications:   None; patient tolerated the procedure well. Impression:    See Postoperative diagnosis above    Recommendations:  -Acid suppression with a proton pump inhibitor. , -Follow up with me., -No NSAIDS    Discharge disposition:  Home in the company of  when able to ambulate    Raquel Anderson MD

## 2022-04-15 ENCOUNTER — OFFICE VISIT (OUTPATIENT)
Dept: SURGERY | Age: 42
End: 2022-04-15
Payer: COMMERCIAL

## 2022-04-15 VITALS
BODY MASS INDEX: 27.83 KG/M2 | RESPIRATION RATE: 18 BRPM | HEIGHT: 66 IN | SYSTOLIC BLOOD PRESSURE: 104 MMHG | OXYGEN SATURATION: 98 % | DIASTOLIC BLOOD PRESSURE: 65 MMHG | HEART RATE: 79 BPM | TEMPERATURE: 98.4 F | WEIGHT: 173.2 LBS

## 2022-04-15 DIAGNOSIS — Z09 POSTOPERATIVE EXAMINATION: Primary | ICD-10-CM

## 2022-04-15 PROCEDURE — 99024 POSTOP FOLLOW-UP VISIT: CPT | Performed by: SURGERY

## 2022-04-15 NOTE — PROGRESS NOTES
1. Have you been to the ER, urgent care clinic since your last visit? Hospitalized since your last visit? no    2. Have you seen or consulted any other health care providers outside of the [address] [place] [address] since your last visit? Include any pap smears or colon screening.  no

## 2022-04-15 NOTE — PROGRESS NOTES
Surgery Progress Note    4/15/2022    Status post dilation of GJ stricture to 15 mm    CC: Doing well    Subjective:   Patient has been able to gain weight without any G-tube feeds. She still vomits when she eats too much. She tries to eat a whole 6 piece chicken nuggets at a time. Not taking her vitamins. Having some reflux intermittently. Does not appear to be compliant with the diet whatsoever. She expects to be able to eat a full portion size when she has a small pouch and a 15 mm stoma. Otherwise, very happy with the progress and progression so far and looking forward to a future abdominal plasty. She is at a healthy weight she feels like    Constitutional: No fever or chills  Neurologic: No headache  Eyes: No scleral icterus or irritated eyes  Nose: No nasal pain or drainage  Mouth: No oral lesions or sore throat  Cardiac: No palpations or chest pain  Pulmonary: No cough or shortness of breath  Gastrointestinal: Intermittent reflux and emesis no nausea, diarrhea, or constipation  Genitourinary: No dysuria  Musculoskeletal: No muscle or joint tenderness  Skin: No rashes or lesions  Psychiatric: No anxiety or depressed mood    Objective:     Visit Vitals  /65   Pulse 79   Temp 98.4 °F (36.9 °C) (Oral)   Resp 18   Ht 5' 6\" (1.676 m)   Wt 173 lb 3.2 oz (78.6 kg)   SpO2 98%   BMI 27.96 kg/m²       General: No acute distress, conversant  Eyes: PERRLA, no scleral icterus  HENT: Normocephalic without oral lesions  Neck: Trachea midline without LAD  Cardiac: Normal pulse rate and rhythm  Pulmonary: Symmetric chest rise with normal effort  GI: Soft, G-tube in place  Skin: Warm without rash  Extremities: No edema or joint stiffness  Psych: Appropriate mood and affect    Assessment:     44-year-old female doing well with enteral nutrition after dilation of GJ stoma after healing of marginal ulcer with nutrition and PPI    Plan:     G-tube removed. Apply gauze dressing daily until closed up.   Preoperative labs showed significant anemia. Patient is noncompliant with all her vitamins. I will have her see Tarik Blount or [facility] [employer] to aggressively correct this and monitor labs going forward. Compliance is definitely an issue with her. I educated her on her small, exercise pouch and how this is a good thing going forward to help keep her weight down. She is a history of maladaptive eating given her history of marginal ulcer and stricture. We will have to work with her going forward to reeducate her on proper eating patterns, goal protein, and how to appropriately eat and adjust her meals. Recommend daily PPI indefinitely for her. We will keep her closely followed up with our practice with her NP's going forward. No plans for abdominoplasty as the patient wishes at this time.     Christian Edwards MD  Bariatric and General Surgeon  [facility]

## 2022-04-29 ENCOUNTER — DOCUMENTATION ONLY (OUTPATIENT)
Dept: SURGERY | Age: 42
End: 2022-04-29

## 2022-04-29 ENCOUNTER — OFFICE VISIT (OUTPATIENT)
Dept: SURGERY | Age: 42
End: 2022-04-29
Payer: COMMERCIAL

## 2022-04-29 ENCOUNTER — TELEPHONE (OUTPATIENT)
Dept: ONCOLOGY | Age: 42
End: 2022-04-29

## 2022-04-29 VITALS
WEIGHT: 168.5 LBS | RESPIRATION RATE: 20 BRPM | BODY MASS INDEX: 27.08 KG/M2 | HEIGHT: 66 IN | SYSTOLIC BLOOD PRESSURE: 92 MMHG | HEART RATE: 75 BPM | OXYGEN SATURATION: 99 % | DIASTOLIC BLOOD PRESSURE: 54 MMHG | TEMPERATURE: 98.4 F

## 2022-04-29 DIAGNOSIS — R11.0 NAUSEA: ICD-10-CM

## 2022-04-29 DIAGNOSIS — K28.9 MARGINAL ULCER: ICD-10-CM

## 2022-04-29 DIAGNOSIS — Z09 S/P GASTROSTOMY TUBE (G TUBE) PLACEMENT, FOLLOW-UP EXAM: ICD-10-CM

## 2022-04-29 DIAGNOSIS — E44.0 MODERATE PROTEIN-CALORIE MALNUTRITION (HCC): Primary | ICD-10-CM

## 2022-04-29 DIAGNOSIS — D50.9 IRON DEFICIENCY ANEMIA, UNSPECIFIED IRON DEFICIENCY ANEMIA TYPE: ICD-10-CM

## 2022-04-29 DIAGNOSIS — Z98.84 S/P GASTRIC BYPASS: ICD-10-CM

## 2022-04-29 PROCEDURE — 99024 POSTOP FOLLOW-UP VISIT: CPT | Performed by: NURSE PRACTITIONER

## 2022-04-29 RX ORDER — PANTOPRAZOLE SODIUM 40 MG/1
40 TABLET, DELAYED RELEASE ORAL DAILY
Qty: 90 TABLET | Refills: 1 | Status: SHIPPED | OUTPATIENT
Start: 2022-04-29 | End: 2022-07-28

## 2022-04-29 NOTE — TELEPHONE ENCOUNTER
Called patient- No answer, left a vm.  to let her know that the [facility] office would not have any hems appt. Until about September but our other office, could get them in sooner. Left phone number to our [address] [place] [address] office [phone] and [facility] office [phone] to call either one and should be able to get in sooner. She could call me back if she had additional question or her PCP office. No answer, left a vm. Also sent a team message to let PCP office know that we were going to defer patient to our other office due to appointment shortage at this time. Brigid Martinez [facility]

## 2022-04-29 NOTE — PROGRESS NOTES
HISTORY OF PRESENT ILLNESS  Amena Song is a 39 y.o. 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 She has lost 5 lbs since her ov 2 weeks ago with Dr. Aayush Longoria  Body mass index is 27.2 kg/m². . She presents today to discuss dietary and vitamin compliance. She does not take Vitamins. She has a hx of iron deficiency anemia and was followed by the [facility] but does not see them anymore. She is trying to eat more solid foods  She is eating eggs, covered to peanut butter and deanna crackers with peanut butter for breakfast.  She is eating eggs again for lunch. She ate [place] food with rice last night for dinner as well. She is working with her family to try different protein sources each week. Next week she is trying salmon. Vitamins:  MVI : no  Calcium : no  B-Vit 12: no  Vit D: No          Ms. Lilia Barnes has a reminder for a \"due or due soon\" health maintenance. I have asked that she contact her primary care provider for follow-up on this health maintenance. Current Outpatient Medications:     sertraline (ZOLOFT) 50 mg tablet, Take 1 Tablet by mouth daily for 90 days. , Disp: 90 Tablet, Rfl: 0    pantoprazole (PROTONIX) 40 mg tablet, Take 1 Tablet by mouth daily for 90 days. Indications: marginal ulcer, Disp: 90 Tablet, Rfl: 0    valACYclovir (VALTREX) 500 mg tablet, Take  by mouth daily. , Disp: , Rfl:     ondansetron (ZOFRAN ODT) 4 mg disintegrating tablet, Take 1 Tablet by mouth every four to six (4-6) hours as needed for Nausea or Vomiting for up to 60 doses. , Disp: 60 Tablet, Rfl: 2    medroxyPROGESTERone (DEPO-PROVERA) 150 mg/mL syrg, 1 mL by IntraMUSCular route every three (3) months., Disp: 1 Syringe, Rfl: 3    OTHER, TUBE FEEDING OF \"2 CELINA\"  3 BOTTLES DAILY; AT  (Patient not taking: Reported on 4/15/2022), Disp: , Rfl:       Visit Vitals  BP (!) 92/54   Pulse 75   Temp 98.4 °F (36.9 °C)   Resp 20   Ht 5' 6\" (1.676 m)   Wt 168 lb 8 oz (76.4 kg)   SpO2 99%   BMI 27.20 kg/m²     HPI    Review of Systems   Respiratory: Negative for shortness of breath. Cardiovascular: Negative for chest pain. Gastrointestinal: Negative for abdominal pain, heartburn, nausea and vomiting. Neurological: Negative for dizziness and headaches. Physical Exam  Constitutional:       Appearance: She is well-developed. HENT:      Mouth/Throat:      Mouth: Mucous membranes are moist.   Cardiovascular:      Rate and Rhythm: Normal rate and regular rhythm. Heart sounds: No murmur heard. No friction rub. No gallop. Pulmonary:      Effort: Pulmonary effort is normal.      Breath sounds: Normal breath sounds. Abdominal:      General: Bowel sounds are normal. There is no distension. Palpations: Abdomen is soft. Tenderness: There is no abdominal tenderness. Comments: Incisions healed well. Musculoskeletal:      Cervical back: Normal range of motion. Skin:     General: Skin is warm and dry. Neurological:      Mental Status: She is alert and oriented to person, place, and time. ASSESSMENT and PLAN  Jono Mayer is a 39 y.o. female with previous Malabsorpative Gastric bypass 17 year ago. She has lost 5 lbs since her ov 2 weeks ago with Dr. June Burrows  Body mass index is 27.2 kg/m². . She presents today to discuss dietary and vitamin compliance. She does not take Vitamins. She has a hx of iron deficiency anemia and was followed by the [facility] but does not see them anymore. She is trying to eat more solid foods  She is eating eggs, covered to peanut butter and deanna crackers with peanut butter for breakfast.  She is eating eggs again for lunch. She ate [place] food with rice last night for dinner as well. She is working with her family to try different protein sources each week. Next week she is trying salmon. We discussed trying protein shake in her coffee. She does not like cold shakes.   Advised patient to continue trying different protein sources. Discouraged her from eating just 1 protein source a day over a week. She should gradually continue to add different protein sources to have variety. Prescription for Aleth 1 daily multi bariatric vitamin 45 mg of iron daily. Also UpCal D. Will have patient continue to follow-up with RD monthly. We will refer her to hematology oncology due to history of iron deficiency anemia. She will need iron infusions. Advised patient regard to diet that is high-protein, low-fat, low-sugar, limited carbohydrates. Strive for 50-60 grams of protein daily. If having a snack, foods that are protein or fiber rich. . No eating/drinking together, chew foods well, and portion control. Measure meals. Discussed snacking behavior and to [facility] pay attention to behavioral factor and habits. Drink at least 40-64 ounces of water or non-calorie/non-carbonated beverages daily. Continue vitamin regiment daily. Exercise at least 3 days a week with cardiovascular and strength training. Patient to follow up in 4 months. Advised to call office if any questions/concerns. 30 Minutes spent face to face with patient, >50 % of time spent counseling.

## 2022-04-29 NOTE — PROGRESS NOTES
1. Have you been to the ER, urgent care clinic since your last visit? Hospitalized since your last visit? No    2. Have you seen or consulted any other health care providers outside of the [address] [place] [address] since your last visit? Include any pap smears or colon screening.  No

## 2022-05-26 ENCOUNTER — TELEPHONE (OUTPATIENT)
Dept: OBGYN CLINIC | Age: 42
End: 2022-05-26

## 2022-05-26 RX ORDER — SERTRALINE HYDROCHLORIDE 50 MG/1
50 TABLET, FILM COATED ORAL DAILY
Qty: 90 TABLET | Refills: 0 | Status: SHIPPED | OUTPATIENT
Start: 2022-05-26 | End: 2022-08-04 | Stop reason: SDUPTHER

## 2022-05-27 RX ORDER — MEDROXYPROGESTERONE ACETATE 150 MG/ML
150 INJECTION, SUSPENSION INTRAMUSCULAR
Qty: 1 ML | Refills: 0
Start: 2022-05-27 | End: 2022-07-13 | Stop reason: SDUPTHER

## 2022-05-27 NOTE — TELEPHONE ENCOUNTER
This nurse attempted to reach the patient and left a message that a refill has been sent but that she needs to contact the office to set up appointment for ae    My chart message sent as well.

## 2022-06-01 RX ORDER — VALACYCLOVIR HYDROCHLORIDE 500 MG/1
500 TABLET, FILM COATED ORAL DAILY
Qty: 30 TABLET | Refills: 0 | Status: SHIPPED | OUTPATIENT
Start: 2022-06-01 | End: 2022-07-13 | Stop reason: SDUPTHER

## 2022-06-01 NOTE — TELEPHONE ENCOUNTER
This nurse attempted to reach the patient and left a message of need for ae to be scheduled as per MD order      Patient now sent message asking for refill of her          valACYclovir (VALTREX) 500 mg tablet  ?  Send refill    Thank you

## 2022-06-15 ENCOUNTER — OFFICE VISIT (OUTPATIENT)
Dept: ONCOLOGY | Age: 42
End: 2022-06-15
Payer: COMMERCIAL

## 2022-06-15 VITALS
OXYGEN SATURATION: 98 % | RESPIRATION RATE: 16 BRPM | SYSTOLIC BLOOD PRESSURE: 106 MMHG | BODY MASS INDEX: 26.9 KG/M2 | HEART RATE: 63 BPM | HEIGHT: 66 IN | TEMPERATURE: 98.3 F | WEIGHT: 167.4 LBS | DIASTOLIC BLOOD PRESSURE: 65 MMHG

## 2022-06-15 DIAGNOSIS — D50.8 OTHER IRON DEFICIENCY ANEMIA: ICD-10-CM

## 2022-06-15 DIAGNOSIS — Z98.84 HISTORY OF ROUX-EN-Y GASTRIC BYPASS: Primary | ICD-10-CM

## 2022-06-15 DIAGNOSIS — L65.9 HAIR LOSS: ICD-10-CM

## 2022-06-15 DIAGNOSIS — Z98.84 HISTORY OF ROUX-EN-Y GASTRIC BYPASS: ICD-10-CM

## 2022-06-15 PROCEDURE — 99204 OFFICE O/P NEW MOD 45 MIN: CPT | Performed by: INTERNAL MEDICINE

## 2022-06-15 NOTE — LETTER
6/20/2022    Patient: Galileo Rocha   YOB: 1980   Date of Visit: 6/15/2022     Sammy Connolly NP  [address] [address]  [address] [facility] [address]    Dear Sammy Connolly NP,      Thank you for referring Ms. Noemi Caballero to [facility] for evaluation. My notes for this consultation are attached. If you have questions, please do not hesitate to call me. I look forward to following your patient along with you.       Sincerely,    Ean Rodriges MD

## 2022-06-15 NOTE — PROGRESS NOTES
[facility] [place] at [address], [address] [facility] 1007 [facility] Stage: [phone]  F: [phone] Patient ID  Name: Alessandro Castaneda  YOB: 1980  MRN: [id number]  Referring Provider:   Dayron Urbano NP  [address],  [address] [place] [address]  Primary Care Provider:   Unknown, Provider, PA       HEMATOLOGY/MEDICAL ONCOLOGY  NOTE   Date of Visit: 06/15/22  Reason for Evaluation:     Chief Complaint   Patient presents with    New Patient     New patient here for iron deficiency. She denies pain. Subjective:     History of Present Illness:     Alessandro Castaneda is a 43 y.o. F who presents for an initial evaluation for Iron defiency anemia. This is a new problem. Problem has occurred for years. She reports that she has been on Depot to control heavy menses in the past. She reports that things are controlled since being on Depot. Problem resolves for periods. She has received IV iron in the past(at [facility]). Patient reports dealing with anorexia and fatigue. She had Gastric Bypass in 2005. She reportedly had an esophageal ulcer and required a feeding tube for about 5 months over 2 years. She reports having had the feeding tube recently removed. Patient reports decreased oral intake. Patient denies any bowel or bladder problems other than constipation. Patient denies any pain. Patient denies any shortness of breath. and Patient denies any cough.   -  -  Past Medical History:   Diagnosis Date    Anemia     Anxiety     GERD (gastroesophageal reflux disease)     Hepatitis C 2010    treated    Hx of abnormal Pap smear     Ill-defined condition     anemia hx, has received several blood transfusion, last blood transfusion May 5 2014    Intestinal perforation [facility])     Psychiatric disorder     OCD    PUD (peptic ulcer disease)       Past Surgical History:   Procedure Laterality Date    HX GASTRIC BYPASS  2005    HX GI  2009 perforated intestines    HX GYN       x 4    HX OTHER SURGICAL  2022    FEEDING TUBE    Vaginally Delivery: 4 times  Social History     Tobacco Use    Smoking status: Former Smoker     Quit date: 2019     Years since quitting: 3.3    Smokeless tobacco: Never Used    Tobacco comment: DID NOT SMOKE DAILY   Substance Use Topics    Alcohol use: No      Family History   Problem Relation Age of Onset    Diabetes Maternal Grandmother     Anesth Problems Neg Hx      Current Outpatient Medications   Medication Sig    valACYclovir (VALTREX) 500 mg tablet Take 1 Tablet by mouth daily.  medroxyPROGESTERone (DEPO-PROVERA) 150 mg/mL syrg 1 mL by IntraMUSCular route every three (3) months.  sertraline (ZOLOFT) 50 mg tablet Take 1 Tablet by mouth daily for 90 days.  pantoprazole (PROTONIX) 40 mg tablet Take 1 Tablet by mouth daily for 90 days. Indications: marginal ulcer    OTHER TUBE FEEDING OF \"2 CELINA\"  3 BOTTLES DAILY; AT HS (Patient not taking: Reported on 4/15/2022)    ondansetron (ZOFRAN ODT) 4 mg disintegrating tablet Take 1 Tablet by mouth every four to six (4-6) hours as needed for Nausea or Vomiting for up to 60 doses. (Patient not taking: Reported on 6/15/2022)     No current facility-administered medications for this visit. Allergies   Allergen Reactions    Latex Hives and Itching    Codeine Itching and Other (comments)     MUST TAKE BENADRYL PRIOR TO TAKING      -  Review of Systems provided by:patient  General: denies fever and denies fatigue  Eyes: denies any acute vision loss and denies any eye pain  HEENT: denies epistaxis and denies trouble swallowing  Cardio: denies any chest pain and denies any leg swelling  Resp: denies any shortness of breath. denies any hemoptysis. Abdomen: denies any abdominal pain, denies any vomiting, denies any diarrhea and reports nausea  MSK: denies any myalgias, reports arthralgias and has a history of dislocated knee cap.    Skin: denies any rash and denies any itching  Lymph: denies any lymph node enlargement and denies any lymph node tenderness  Neuro: denies any headache and denies any seizure history  : Denies any dysuria. Denies any hematuria. Psych: denies depression and denies anxiety    Objective:     Visit Vitals  /65 (BP 1 Location: Right upper arm, BP Patient Position: Sitting, BP Cuff Size: Large adult)   Pulse 63   Temp 98.3 °F (36.8 °C) (Oral)   Resp 16   Ht 5' 6\" (1.676 m)   Wt 167 lb 6.4 oz (75.9 kg)   SpO2 98%   BMI 27.02 kg/m²     ECOG PS: 1- Restricted in physically strenuous activity but ambulatory and able to carry out work of a light or sedentary nature, e.g., light house work, office work. Physical Exam  Constitutional: No acute distress. , Non-toxic appearance. and Non-diaphoretic. HENT: Normocephalic and atraumatic head. Eyes: Normal Conjunctivae. Anicteric sclerae. Cardiovascular: S1,S2 auscultated. No pitting edema. Pulmonary: Normal Respiratory Effort. No wheezing. No rhonchi. No rales. Abdominal: Normal bowel sounds. Soft Abdomen to palpation. No abdominal tenderness. Musculoskeletal: No muscle pain on palpation. No temporal muscle wasting on inspection. Skin: No rash. Neurological: Alert and oriented. No tremor on inspection. Normal Gait. Psychiatric: mood normal. normal speech rate normal affect    Results:     I personally reviewed Epic EHR labs/results below:  Lab Results   Component Value Date/Time    WBC 3.9 02/21/2022 10:29 AM    HGB 10.7 (L) 02/21/2022 10:29 AM    HCT 35.0 02/21/2022 10:29 AM    PLATELET 755 03/23/0633 10:29 AM    MCV 81.2 02/21/2022 10:29 AM    ABS.  NEUTROPHILS 1.9 01/04/2022 02:53 AM    Hemoglobin (POC) 9.5 (L) 10/12/2012 11:12 AM    Hematocrit (POC) 28 (L) 10/12/2012 11:12 AM     Lab Results   Component Value Date/Time    Sodium 138 02/21/2022 10:29 AM    Potassium 4.2 02/21/2022 10:29 AM    Chloride 109 (H) 02/21/2022 10:29 AM    CO2 24 02/21/2022 10:29 AM    Glucose 58 (L) 02/21/2022 10:29 AM    BUN 16 02/21/2022 10:29 AM    Creatinine 0.70 02/21/2022 10:29 AM    GFR est AA >60 02/21/2022 10:29 AM    GFR est non-AA >60 02/21/2022 10:29 AM    Calcium 8.6 02/21/2022 10:29 AM    Sodium (POC) 139 10/12/2012 11:12 AM    Potassium (POC) 3.6 10/12/2012 11:12 AM    Chloride (POC) 105 10/12/2012 11:12 AM    Glucose (POC) 83 10/12/2012 11:12 AM    BUN (POC) 10 10/12/2012 11:12 AM    Creatinine (POC) 0.8 10/12/2012 11:12 AM    Calcium, ionized (POC) 1.12 10/12/2012 11:12 AM     Lab Results   Component Value Date/Time    Bilirubin, total 0.4 02/21/2022 10:29 AM    ALT (SGPT) 84 (H) 02/21/2022 10:29 AM    Alk. phosphatase 75 02/21/2022 10:29 AM    Protein, total 6.8 02/21/2022 10:29 AM    Albumin 3.2 (L) 02/21/2022 10:29 AM    Globulin 3.6 02/21/2022 10:29 AM     Lab Results   Component Value Date/Time    Iron 16 (L) 02/21/2022 10:29 AM    TIBC 423 02/21/2022 10:29 AM    Iron % saturation 4 (L) 02/21/2022 10:29 AM       Assessment and Recommendations:     1. History of Dinah-en-Y gastric bypass  Will likely have an ongoing need for IV iron repletion. She will also periodically need her B12 and copper levels checked. - COPPER; Future  - VITAMIN B12; Future  - FERRITIN; Future  - IRON PROFILE; Future    2. Other iron deficiency anemia  Today, we discussed that iron deficiency is not a disease in itself but rather a symptom of another disease and it remains important that the cause of iron deficiency always be determined at best effort. We discussed that this could be an issue with the G.I. tract and iron absorption and due to sources of gross or occult bleeding. We discussed that iron deficiency could be simply related to excessive bleeding with iron loss. We discussed that iron loss can sometimes be a sign of occult bleeding in the G.I. tract. We discussed that attempts to replete iron through the gut is first attempted if tolerable.  However, we discussed that Iron deficiency may need IV iron repletion. We discussed that this could be with iron sucrose or injectafer.   -we discussed that taking IV Iron is indicated for iron deficiency related to absorption issues with in her case her Dinah -en-Y bypass surgical history. We discussed the IV iron repletion is typically well tolerated with a small risk of G. I. toxicity such as gastritis or constipation. We discussed it rarely can cause anaphylactic shock and resultant death (could be a rare event). We discussed the risk of short-term pain related to iv iron infusion. However, we discussed that this usually subsides spontaneously. We discussed that iron sucrose is typically used for patients in pregnancy due to more studies. Also, IV Iron repletion is typically only pursued in the second and third trimesters and if there is any possibility that she may be pregnant then she will need testing to evaluate. - CBC WITH AUTOMATED DIFF; Future  - VITAMIN B12; Future  - FERRITIN; Future  - IRON PROFILE; Future    3. Hair loss  Will check the following:  - TSH 3RD GENERATION; Future  - COPPER; Future    Follow-up and Dispositions    · Return in about 8 weeks (around 8/10/2022). we discussed that it is important for her to determine if she was treated for hepatitis C as it states in Epic. We discussed that she still should be monitored periodically for the development of liver cirrhosis.       Rosy Giron MD  Hematology/Medical Oncology Provider  Koff[facility] Sheppard  P: [phone]    Signed By:   Yolis Ortega MD

## 2022-06-15 NOTE — PROGRESS NOTES
1. Have you been to the ER, urgent care clinic since your last visit? Hospitalized since your last visit? new patient    2. Have you seen or consulted any other health care providers outside of the [address] [place] [address] since your last visit? Include any pap smears or colon screening. new patient        Vitals:    06/15/22 1402   BP: 106/65   Pulse: 63   Resp: 16   Temp: 98.3 °F (36.8 °C)   TempSrc: Oral   SpO2: 98%   Weight: 167 lb 6.4 oz (75.9 kg)   Height: 5' 6\" (1.676 m)           Chief Complaint   Patient presents with    New Patient     New patient here for iron deficiency. She denies pain.

## 2022-06-16 ENCOUNTER — TELEPHONE (OUTPATIENT)
Dept: ONCOLOGY | Age: 42
End: 2022-06-16

## 2022-06-16 PROBLEM — D50.8 IRON DEFICIENCY ANEMIA SECONDARY TO INADEQUATE DIETARY IRON INTAKE: Status: ACTIVE | Noted: 2022-06-16

## 2022-06-16 LAB
BASOPHILS # BLD: 0 K/UL (ref 0–0.1)
BASOPHILS NFR BLD: 0 % (ref 0–1)
DIFFERENTIAL METHOD BLD: ABNORMAL
EOSINOPHIL # BLD: 0.2 K/UL (ref 0–0.4)
EOSINOPHIL NFR BLD: 3 % (ref 0–7)
ERYTHROCYTE [DISTWIDTH] IN BLOOD BY AUTOMATED COUNT: 21.9 % (ref 11.5–14.5)
FERRITIN SERPL-MCNC: 4 NG/ML (ref 8–252)
HCT VFR BLD AUTO: 38.4 % (ref 35–47)
HGB BLD-MCNC: 11.6 G/DL (ref 11.5–16)
IMM GRANULOCYTES # BLD AUTO: 0 K/UL (ref 0–0.04)
IMM GRANULOCYTES NFR BLD AUTO: 0 % (ref 0–0.5)
IRON SATN MFR SERPL: 6 % (ref 20–50)
IRON SERPL-MCNC: 28 UG/DL (ref 35–150)
LYMPHOCYTES # BLD: 1.8 K/UL (ref 0.8–3.5)
LYMPHOCYTES NFR BLD: 33 % (ref 12–49)
MCH RBC QN AUTO: 23.7 PG (ref 26–34)
MCHC RBC AUTO-ENTMCNC: 30.2 G/DL (ref 30–36.5)
MCV RBC AUTO: 78.4 FL (ref 80–99)
MONOCYTES # BLD: 0.4 K/UL (ref 0–1)
MONOCYTES NFR BLD: 7 % (ref 5–13)
NEUTS SEG # BLD: 2.9 K/UL (ref 1.8–8)
NEUTS SEG NFR BLD: 57 % (ref 32–75)
NRBC # BLD: 0 K/UL (ref 0–0.01)
NRBC BLD-RTO: 0 PER 100 WBC
PLATELET # BLD AUTO: 167 K/UL (ref 150–400)
PMV BLD AUTO: ABNORMAL FL (ref 8.9–12.9)
RBC # BLD AUTO: 4.9 M/UL (ref 3.8–5.2)
RBC MORPH BLD: ABNORMAL
TIBC SERPL-MCNC: 442 UG/DL (ref 250–450)
TSH SERPL DL<=0.05 MIU/L-ACNC: 1.98 UIU/ML (ref 0.36–3.74)
VIT B12 SERPL-MCNC: 565 PG/ML (ref 193–986)
WBC # BLD AUTO: 5.3 K/UL (ref 3.6–11)

## 2022-06-16 RX ORDER — DIPHENHYDRAMINE HYDROCHLORIDE 50 MG/ML
50 INJECTION, SOLUTION INTRAMUSCULAR; INTRAVENOUS AS NEEDED
Status: CANCELLED
Start: 2022-06-23

## 2022-06-16 RX ORDER — DIPHENHYDRAMINE HYDROCHLORIDE 50 MG/ML
25 INJECTION, SOLUTION INTRAMUSCULAR; INTRAVENOUS AS NEEDED
Status: CANCELLED
Start: 2022-06-30

## 2022-06-16 RX ORDER — ACETAMINOPHEN 325 MG/1
650 TABLET ORAL AS NEEDED
Status: CANCELLED
Start: 2022-06-23

## 2022-06-16 RX ORDER — SODIUM CHLORIDE 0.9 % (FLUSH) 0.9 %
5-40 SYRINGE (ML) INJECTION AS NEEDED
Status: CANCELLED | OUTPATIENT
Start: 2022-06-30

## 2022-06-16 RX ORDER — ALBUTEROL SULFATE 0.83 MG/ML
2.5 SOLUTION RESPIRATORY (INHALATION) AS NEEDED
Status: CANCELLED
Start: 2022-06-23

## 2022-06-16 RX ORDER — EPINEPHRINE 1 MG/ML
0.3 INJECTION, SOLUTION, CONCENTRATE INTRAVENOUS AS NEEDED
Status: CANCELLED | OUTPATIENT
Start: 2022-06-30

## 2022-06-16 RX ORDER — SODIUM CHLORIDE 9 MG/ML
5-250 INJECTION, SOLUTION INTRAVENOUS AS NEEDED
Status: CANCELLED | OUTPATIENT
Start: 2022-06-30

## 2022-06-16 RX ORDER — SODIUM CHLORIDE 0.9 % (FLUSH) 0.9 %
5-40 SYRINGE (ML) INJECTION AS NEEDED
Status: CANCELLED | OUTPATIENT
Start: 2022-06-23

## 2022-06-16 RX ORDER — EPINEPHRINE 1 MG/ML
0.3 INJECTION, SOLUTION, CONCENTRATE INTRAVENOUS AS NEEDED
Status: CANCELLED | OUTPATIENT
Start: 2022-06-23

## 2022-06-16 RX ORDER — HYDROCORTISONE SODIUM SUCCINATE 100 MG/2ML
100 INJECTION, POWDER, FOR SOLUTION INTRAMUSCULAR; INTRAVENOUS AS NEEDED
Status: CANCELLED | OUTPATIENT
Start: 2022-06-23

## 2022-06-16 RX ORDER — ONDANSETRON 2 MG/ML
8 INJECTION INTRAMUSCULAR; INTRAVENOUS AS NEEDED
Status: CANCELLED | OUTPATIENT
Start: 2022-06-23

## 2022-06-16 RX ORDER — SODIUM CHLORIDE 9 MG/ML
5-40 INJECTION INTRAMUSCULAR; INTRAVENOUS; SUBCUTANEOUS AS NEEDED
Status: CANCELLED | OUTPATIENT
Start: 2022-06-23

## 2022-06-16 RX ORDER — SODIUM CHLORIDE 9 MG/ML
5-250 INJECTION, SOLUTION INTRAVENOUS AS NEEDED
Status: CANCELLED | OUTPATIENT
Start: 2022-06-23

## 2022-06-16 RX ORDER — ACETAMINOPHEN 325 MG/1
650 TABLET ORAL AS NEEDED
Status: CANCELLED
Start: 2022-06-30

## 2022-06-16 RX ORDER — DIPHENHYDRAMINE HYDROCHLORIDE 50 MG/ML
25 INJECTION, SOLUTION INTRAMUSCULAR; INTRAVENOUS AS NEEDED
Status: CANCELLED
Start: 2022-06-23

## 2022-06-16 RX ORDER — ALBUTEROL SULFATE 0.83 MG/ML
2.5 SOLUTION RESPIRATORY (INHALATION) AS NEEDED
Status: CANCELLED
Start: 2022-06-30

## 2022-06-16 RX ORDER — HEPARIN 100 UNIT/ML
500 SYRINGE INTRAVENOUS AS NEEDED
Status: CANCELLED
Start: 2022-06-30

## 2022-06-16 RX ORDER — HYDROCORTISONE SODIUM SUCCINATE 100 MG/2ML
100 INJECTION, POWDER, FOR SOLUTION INTRAMUSCULAR; INTRAVENOUS AS NEEDED
Status: CANCELLED | OUTPATIENT
Start: 2022-06-30

## 2022-06-16 RX ORDER — ONDANSETRON 2 MG/ML
8 INJECTION INTRAMUSCULAR; INTRAVENOUS AS NEEDED
Status: CANCELLED | OUTPATIENT
Start: 2022-06-30

## 2022-06-16 RX ORDER — DIPHENHYDRAMINE HYDROCHLORIDE 50 MG/ML
50 INJECTION, SOLUTION INTRAMUSCULAR; INTRAVENOUS AS NEEDED
Status: CANCELLED
Start: 2022-06-30

## 2022-06-16 RX ORDER — HEPARIN 100 UNIT/ML
500 SYRINGE INTRAVENOUS AS NEEDED
Status: CANCELLED
Start: 2022-06-23

## 2022-06-16 RX ORDER — SODIUM CHLORIDE 9 MG/ML
5-40 INJECTION INTRAMUSCULAR; INTRAVENOUS; SUBCUTANEOUS AS NEEDED
Status: CANCELLED | OUTPATIENT
Start: 2022-06-30

## 2022-06-16 NOTE — PROGRESS NOTES
Hi, you definitely have iron deficiency. Your TSH level was normal. You do not have anemia as your hemoglobin is 11.6 but likely will develop if we don't give you IV iron. We are requesting that you receive the two treatment Injectafer especially with your travel.   Thanks,  Dr. Gerson Blackman

## 2022-06-16 NOTE — TELEPHONE ENCOUNTER
LVM for patient to let her know the days and times for her iron told her to call back if she needed to move them

## 2022-06-16 NOTE — PROGRESS NOTES
[facility] at [facility]  [phone]    06/16/22 8:57 AM - Called and spoke with the patient. Patient's ID verified x 2. Advised patient of Dr. Sylvia Madrid result note. Advised patient if Madie Bean is denied, we may need to try Venofer which is typically 4 infusions. Advised patient our , Sandy Dsouza, would be calling her to set up her appointments. The patient voiced understanding and gratitude for the call. No further questions or concerns.

## 2022-06-17 LAB — COPPER SERPL-MCNC: 117 UG/DL (ref 80–158)

## 2022-06-20 ENCOUNTER — TELEPHONE (OUTPATIENT)
Dept: ONCOLOGY | Age: 42
End: 2022-06-20

## 2022-06-20 NOTE — TELEPHONE ENCOUNTER
[address] at [place]  [phone]    06/20/22 4:32 PM - Called and spoke with the patient. Patient's ID verified x 2. Advised we had to move her Injectafer appointment out due to the [place] still pending. Advised she is now scheduled for Thursday, 6/23 at  1:30 PM.  That is fine with the patient. Advised her other appointment was also moved to Thursday, 6/30 at 1:00 PM.  The patient states that one will need rescheduled, as she has a meeting at work. Augustus Emerson will call her to reschedule the appointment for 6/30. The patient voiced understanding and gratitude for the call. No further questions or concerns.

## 2022-06-21 ENCOUNTER — HOSPITAL ENCOUNTER (OUTPATIENT)
Dept: INFUSION THERAPY | Age: 42
End: 2022-06-21

## 2022-06-22 ENCOUNTER — TELEPHONE (OUTPATIENT)
Dept: ONCOLOGY | Age: 42
End: 2022-06-22

## 2022-06-22 ENCOUNTER — PATIENT MESSAGE (OUTPATIENT)
Dept: ONCOLOGY | Age: 42
End: 2022-06-22

## 2022-06-22 NOTE — TELEPHONE ENCOUNTER
[address] at [facility]  [phone]    06/22/22 4:15 PM - Called and spoke with the patient. She did hear Jemma's voicemail. The patient will not be able to have her first infusion next Thursday, 6/30 due to work. Christ Annette will need to call her tomorrow to get her appointments scheduled. The patient states she will have open availability. [facility] will call tomorrow. The patient voiced understanding and gratitude for the call. No further questions or concerns.

## 2022-06-22 NOTE — TELEPHONE ENCOUNTER
Called patient and LVM to let her know that we had to change her iron to a different one and we have her down for next week to start.  Told patient to call if she needed to change the days or times

## 2022-06-23 ENCOUNTER — HOSPITAL ENCOUNTER (OUTPATIENT)
Dept: INFUSION THERAPY | Age: 42
End: 2022-06-23

## 2022-06-24 RX ORDER — EPINEPHRINE 1 MG/ML
0.3 INJECTION, SOLUTION, CONCENTRATE INTRAVENOUS AS NEEDED
OUTPATIENT
Start: 2022-08-03

## 2022-06-24 RX ORDER — SODIUM CHLORIDE 9 MG/ML
5-250 INJECTION, SOLUTION INTRAVENOUS AS NEEDED
OUTPATIENT
Start: 2022-08-03

## 2022-06-24 RX ORDER — SODIUM CHLORIDE 9 MG/ML
5-40 INJECTION INTRAMUSCULAR; INTRAVENOUS; SUBCUTANEOUS AS NEEDED
Status: CANCELLED | OUTPATIENT
Start: 2022-07-06

## 2022-06-24 RX ORDER — HEPARIN 100 UNIT/ML
500 SYRINGE INTRAVENOUS AS NEEDED
Status: CANCELLED
Start: 2022-07-06

## 2022-06-24 RX ORDER — HEPARIN 100 UNIT/ML
500 SYRINGE INTRAVENOUS AS NEEDED
Start: 2022-08-03

## 2022-06-24 RX ORDER — DIPHENHYDRAMINE HYDROCHLORIDE 50 MG/ML
50 INJECTION, SOLUTION INTRAMUSCULAR; INTRAVENOUS AS NEEDED
Start: 2022-08-03

## 2022-06-24 RX ORDER — HEPARIN 100 UNIT/ML
500 SYRINGE INTRAVENOUS AS NEEDED
Status: CANCELLED
Start: 2022-07-27

## 2022-06-24 RX ORDER — ALBUTEROL SULFATE 0.83 MG/ML
2.5 SOLUTION RESPIRATORY (INHALATION) AS NEEDED
Status: CANCELLED
Start: 2022-07-13

## 2022-06-24 RX ORDER — ALBUTEROL SULFATE 0.83 MG/ML
2.5 SOLUTION RESPIRATORY (INHALATION) AS NEEDED
Status: CANCELLED
Start: 2022-07-06

## 2022-06-24 RX ORDER — ALBUTEROL SULFATE 0.83 MG/ML
2.5 SOLUTION RESPIRATORY (INHALATION) AS NEEDED
Status: CANCELLED
Start: 2022-07-27

## 2022-06-24 RX ORDER — ACETAMINOPHEN 325 MG/1
650 TABLET ORAL AS NEEDED
Start: 2022-08-03

## 2022-06-24 RX ORDER — DIPHENHYDRAMINE HYDROCHLORIDE 50 MG/ML
50 INJECTION, SOLUTION INTRAMUSCULAR; INTRAVENOUS AS NEEDED
Status: CANCELLED
Start: 2022-06-29

## 2022-06-24 RX ORDER — DIPHENHYDRAMINE HYDROCHLORIDE 50 MG/ML
25 INJECTION, SOLUTION INTRAMUSCULAR; INTRAVENOUS AS NEEDED
Status: CANCELLED
Start: 2022-07-06

## 2022-06-24 RX ORDER — EPINEPHRINE 1 MG/ML
0.3 INJECTION, SOLUTION, CONCENTRATE INTRAVENOUS AS NEEDED
Status: CANCELLED | OUTPATIENT
Start: 2022-07-13

## 2022-06-24 RX ORDER — SODIUM CHLORIDE 9 MG/ML
5-40 INJECTION INTRAMUSCULAR; INTRAVENOUS; SUBCUTANEOUS AS NEEDED
Status: CANCELLED | OUTPATIENT
Start: 2022-07-27

## 2022-06-24 RX ORDER — HYDROCORTISONE SODIUM SUCCINATE 100 MG/2ML
100 INJECTION, POWDER, FOR SOLUTION INTRAMUSCULAR; INTRAVENOUS AS NEEDED
Status: CANCELLED | OUTPATIENT
Start: 2022-07-27

## 2022-06-24 RX ORDER — DIPHENHYDRAMINE HYDROCHLORIDE 50 MG/ML
25 INJECTION, SOLUTION INTRAMUSCULAR; INTRAVENOUS AS NEEDED
Status: CANCELLED
Start: 2022-06-29

## 2022-06-24 RX ORDER — ACETAMINOPHEN 325 MG/1
650 TABLET ORAL AS NEEDED
Status: CANCELLED
Start: 2022-07-06

## 2022-06-24 RX ORDER — ONDANSETRON 2 MG/ML
8 INJECTION INTRAMUSCULAR; INTRAVENOUS AS NEEDED
Status: CANCELLED | OUTPATIENT
Start: 2022-07-13

## 2022-06-24 RX ORDER — SODIUM CHLORIDE 0.9 % (FLUSH) 0.9 %
5-40 SYRINGE (ML) INJECTION AS NEEDED
Status: CANCELLED | OUTPATIENT
Start: 2022-06-29

## 2022-06-24 RX ORDER — SODIUM CHLORIDE 0.9 % (FLUSH) 0.9 %
5-40 SYRINGE (ML) INJECTION AS NEEDED
Status: CANCELLED | OUTPATIENT
Start: 2022-07-13

## 2022-06-24 RX ORDER — ONDANSETRON 2 MG/ML
8 INJECTION INTRAMUSCULAR; INTRAVENOUS AS NEEDED
Status: CANCELLED | OUTPATIENT
Start: 2022-07-27

## 2022-06-24 RX ORDER — DIPHENHYDRAMINE HYDROCHLORIDE 50 MG/ML
50 INJECTION, SOLUTION INTRAMUSCULAR; INTRAVENOUS AS NEEDED
Status: CANCELLED
Start: 2022-07-06

## 2022-06-24 RX ORDER — ONDANSETRON 2 MG/ML
8 INJECTION INTRAMUSCULAR; INTRAVENOUS AS NEEDED
Status: CANCELLED | OUTPATIENT
Start: 2022-06-29

## 2022-06-24 RX ORDER — ALBUTEROL SULFATE 0.83 MG/ML
2.5 SOLUTION RESPIRATORY (INHALATION) AS NEEDED
Start: 2022-08-03

## 2022-06-24 RX ORDER — HYDROCORTISONE SODIUM SUCCINATE 100 MG/2ML
100 INJECTION, POWDER, FOR SOLUTION INTRAMUSCULAR; INTRAVENOUS AS NEEDED
Status: CANCELLED | OUTPATIENT
Start: 2022-06-29

## 2022-06-24 RX ORDER — SODIUM CHLORIDE 9 MG/ML
5-40 INJECTION INTRAMUSCULAR; INTRAVENOUS; SUBCUTANEOUS AS NEEDED
Status: CANCELLED | OUTPATIENT
Start: 2022-07-13

## 2022-06-24 RX ORDER — ACETAMINOPHEN 325 MG/1
650 TABLET ORAL AS NEEDED
Status: CANCELLED
Start: 2022-06-29

## 2022-06-24 RX ORDER — ACETAMINOPHEN 325 MG/1
650 TABLET ORAL AS NEEDED
Status: CANCELLED
Start: 2022-07-27

## 2022-06-24 RX ORDER — SODIUM CHLORIDE 9 MG/ML
5-40 INJECTION INTRAMUSCULAR; INTRAVENOUS; SUBCUTANEOUS AS NEEDED
OUTPATIENT
Start: 2022-08-03

## 2022-06-24 RX ORDER — SODIUM CHLORIDE 9 MG/ML
5-40 INJECTION INTRAMUSCULAR; INTRAVENOUS; SUBCUTANEOUS AS NEEDED
Status: CANCELLED | OUTPATIENT
Start: 2022-06-29

## 2022-06-24 RX ORDER — ALBUTEROL SULFATE 0.83 MG/ML
2.5 SOLUTION RESPIRATORY (INHALATION) AS NEEDED
Status: CANCELLED
Start: 2022-06-29

## 2022-06-24 RX ORDER — DIPHENHYDRAMINE HYDROCHLORIDE 50 MG/ML
50 INJECTION, SOLUTION INTRAMUSCULAR; INTRAVENOUS AS NEEDED
Status: CANCELLED
Start: 2022-07-13

## 2022-06-24 RX ORDER — SODIUM CHLORIDE 9 MG/ML
5-250 INJECTION, SOLUTION INTRAVENOUS AS NEEDED
Status: CANCELLED | OUTPATIENT
Start: 2022-06-29

## 2022-06-24 RX ORDER — ACETAMINOPHEN 325 MG/1
650 TABLET ORAL AS NEEDED
Status: CANCELLED
Start: 2022-07-13

## 2022-06-24 RX ORDER — ONDANSETRON 2 MG/ML
8 INJECTION INTRAMUSCULAR; INTRAVENOUS AS NEEDED
Status: CANCELLED | OUTPATIENT
Start: 2022-07-06

## 2022-06-24 RX ORDER — ONDANSETRON 2 MG/ML
8 INJECTION INTRAMUSCULAR; INTRAVENOUS AS NEEDED
OUTPATIENT
Start: 2022-08-03

## 2022-06-24 RX ORDER — DIPHENHYDRAMINE HYDROCHLORIDE 50 MG/ML
25 INJECTION, SOLUTION INTRAMUSCULAR; INTRAVENOUS AS NEEDED
Status: CANCELLED
Start: 2022-07-13

## 2022-06-24 RX ORDER — HYDROCORTISONE SODIUM SUCCINATE 100 MG/2ML
100 INJECTION, POWDER, FOR SOLUTION INTRAMUSCULAR; INTRAVENOUS AS NEEDED
Status: CANCELLED | OUTPATIENT
Start: 2022-07-06

## 2022-06-24 RX ORDER — EPINEPHRINE 1 MG/ML
0.3 INJECTION, SOLUTION, CONCENTRATE INTRAVENOUS AS NEEDED
Status: CANCELLED | OUTPATIENT
Start: 2022-07-06

## 2022-06-24 RX ORDER — DIPHENHYDRAMINE HYDROCHLORIDE 50 MG/ML
25 INJECTION, SOLUTION INTRAMUSCULAR; INTRAVENOUS AS NEEDED
Start: 2022-08-03

## 2022-06-24 RX ORDER — SODIUM CHLORIDE 9 MG/ML
5-250 INJECTION, SOLUTION INTRAVENOUS AS NEEDED
Status: CANCELLED | OUTPATIENT
Start: 2022-07-06

## 2022-06-24 RX ORDER — HEPARIN 100 UNIT/ML
500 SYRINGE INTRAVENOUS AS NEEDED
Status: CANCELLED
Start: 2022-07-13

## 2022-06-24 RX ORDER — SODIUM CHLORIDE 0.9 % (FLUSH) 0.9 %
5-40 SYRINGE (ML) INJECTION AS NEEDED
Status: CANCELLED | OUTPATIENT
Start: 2022-07-06

## 2022-06-24 RX ORDER — EPINEPHRINE 1 MG/ML
0.3 INJECTION, SOLUTION, CONCENTRATE INTRAVENOUS AS NEEDED
Status: CANCELLED | OUTPATIENT
Start: 2022-07-27

## 2022-06-24 RX ORDER — DIPHENHYDRAMINE HYDROCHLORIDE 50 MG/ML
25 INJECTION, SOLUTION INTRAMUSCULAR; INTRAVENOUS AS NEEDED
Status: CANCELLED
Start: 2022-07-27

## 2022-06-24 RX ORDER — SODIUM CHLORIDE 0.9 % (FLUSH) 0.9 %
5-40 SYRINGE (ML) INJECTION AS NEEDED
OUTPATIENT
Start: 2022-08-03

## 2022-06-24 RX ORDER — HYDROCORTISONE SODIUM SUCCINATE 100 MG/2ML
100 INJECTION, POWDER, FOR SOLUTION INTRAMUSCULAR; INTRAVENOUS AS NEEDED
OUTPATIENT
Start: 2022-08-03

## 2022-06-24 RX ORDER — HEPARIN 100 UNIT/ML
500 SYRINGE INTRAVENOUS AS NEEDED
Status: CANCELLED
Start: 2022-06-29

## 2022-06-24 RX ORDER — SODIUM CHLORIDE 9 MG/ML
5-250 INJECTION, SOLUTION INTRAVENOUS AS NEEDED
Status: CANCELLED | OUTPATIENT
Start: 2022-07-27

## 2022-06-24 RX ORDER — DIPHENHYDRAMINE HYDROCHLORIDE 50 MG/ML
50 INJECTION, SOLUTION INTRAMUSCULAR; INTRAVENOUS AS NEEDED
Status: CANCELLED
Start: 2022-07-27

## 2022-06-24 RX ORDER — EPINEPHRINE 1 MG/ML
0.3 INJECTION, SOLUTION, CONCENTRATE INTRAVENOUS AS NEEDED
Status: CANCELLED | OUTPATIENT
Start: 2022-06-29

## 2022-06-24 RX ORDER — SODIUM CHLORIDE 9 MG/ML
5-250 INJECTION, SOLUTION INTRAVENOUS AS NEEDED
Status: CANCELLED | OUTPATIENT
Start: 2022-07-13

## 2022-06-24 RX ORDER — HYDROCORTISONE SODIUM SUCCINATE 100 MG/2ML
100 INJECTION, POWDER, FOR SOLUTION INTRAMUSCULAR; INTRAVENOUS AS NEEDED
Status: CANCELLED | OUTPATIENT
Start: 2022-07-13

## 2022-06-24 RX ORDER — SODIUM CHLORIDE 0.9 % (FLUSH) 0.9 %
5-40 SYRINGE (ML) INJECTION AS NEEDED
Status: CANCELLED | OUTPATIENT
Start: 2022-07-27

## 2022-06-28 ENCOUNTER — APPOINTMENT (OUTPATIENT)
Dept: INFUSION THERAPY | Age: 42
End: 2022-06-28

## 2022-06-29 ENCOUNTER — HOSPITAL ENCOUNTER (OUTPATIENT)
Dept: INFUSION THERAPY | Age: 42
Discharge: HOME | End: 2022-06-29
Payer: COMMERCIAL

## 2022-06-29 VITALS
OXYGEN SATURATION: 100 % | BODY MASS INDEX: 27.05 KG/M2 | WEIGHT: 168.3 LBS | HEIGHT: 66 IN | TEMPERATURE: 97 F | HEART RATE: 72 BPM | SYSTOLIC BLOOD PRESSURE: 103 MMHG | DIASTOLIC BLOOD PRESSURE: 58 MMHG | RESPIRATION RATE: 18 BRPM

## 2022-06-29 DIAGNOSIS — D50.8 IRON DEFICIENCY ANEMIA SECONDARY TO INADEQUATE DIETARY IRON INTAKE: Primary | ICD-10-CM

## 2022-06-29 PROCEDURE — 96365 THER/PROPH/DIAG IV INF INIT: CPT

## 2022-06-29 PROCEDURE — 74011250636 HC RX REV CODE- 250/636: Performed by: NURSE PRACTITIONER

## 2022-06-29 PROCEDURE — 74011000258 HC RX REV CODE- 258: Performed by: NURSE PRACTITIONER

## 2022-06-29 RX ADMIN — IRON SUCROSE 200 MG: 20 INJECTION, SOLUTION INTRAVENOUS at 15:48

## 2022-06-29 NOTE — PROGRESS NOTES
[facility] Progress Note    Date: 2022    Name: Tessie Burgos    MRN: [id number]         : 1980    Ms. Caballero Arrived ambulatory and in no distress for Dose 1 of 5  for Injectafer. Assessment was completed, no acute issues at this time, no new complaints voiced. 24 G PIV established to left arm without difficulty. Ms. Eduar Trejo vitals were reviewed. Visit Vitals  BP (!) 104/45   Pulse 68   Temp 97 °F (36.1 °C)   Resp 18   Ht 5' 6\" (1.676 m)   Wt 76.3 kg (168 lb 4.8 oz)   SpO2 100%   Breastfeeding No   BMI 27.16 kg/m²       Medications:  Medications Administered     iron sucrose (VENOFER) 200 mg in 0.9% sodium chloride 100 mL, overfill volume 10 mL IVPB     Admin Date  2022 Action  New Bag Dose  200 mg Rate  120 mL/hr Route  IntraVENous Administered By  [place], [address] tolerated treatment well and was discharged from [place] [address] in stable condition . PIV flushed & removed. Discharge instructions reviewed with patient, verbalized understanding. Patient politely declined to stay 30 minutes post infusion for monitoring. She is to return on  at  for her next appointment.     [facility]  2022

## 2022-06-30 ENCOUNTER — HOSPITAL ENCOUNTER (OUTPATIENT)
Dept: INFUSION THERAPY | Age: 42
End: 2022-06-30

## 2022-07-01 ENCOUNTER — TELEPHONE (OUTPATIENT)
Dept: SURGERY | Age: 42
End: 2022-07-01

## 2022-07-01 NOTE — TELEPHONE ENCOUNTER
Returned call to [facility] with [facility]. [facility] states she has not been able to reach Ms Tirso Rajput for 4 months regarding her nutritional supplies. She is requesting a order to D/C from services. I informed her as per Dr Guo Nail note from 4/12/22 the G Tube was removed. She will D/C from services.

## 2022-07-01 NOTE — TELEPHONE ENCOUNTER
[facility] wanting to know if this patient still needed to be in there care for supplies. They have been unable to reach the patient for three months now. Please advise.

## 2022-07-06 ENCOUNTER — HOSPITAL ENCOUNTER (OUTPATIENT)
Dept: INFUSION THERAPY | Age: 42
Discharge: HOME | End: 2022-07-06
Payer: COMMERCIAL

## 2022-07-06 VITALS
HEART RATE: 56 BPM | TEMPERATURE: 97.4 F | SYSTOLIC BLOOD PRESSURE: 119 MMHG | DIASTOLIC BLOOD PRESSURE: 71 MMHG | RESPIRATION RATE: 18 BRPM

## 2022-07-06 DIAGNOSIS — D50.8 IRON DEFICIENCY ANEMIA SECONDARY TO INADEQUATE DIETARY IRON INTAKE: Primary | ICD-10-CM

## 2022-07-06 PROCEDURE — 96365 THER/PROPH/DIAG IV INF INIT: CPT

## 2022-07-06 PROCEDURE — 74011000258 HC RX REV CODE- 258: Performed by: NURSE PRACTITIONER

## 2022-07-06 PROCEDURE — 74011250636 HC RX REV CODE- 250/636: Performed by: NURSE PRACTITIONER

## 2022-07-06 RX ADMIN — IRON SUCROSE 200 MG: 20 INJECTION, SOLUTION INTRAVENOUS at 16:08

## 2022-07-06 NOTE — PROGRESS NOTES
[facility] Progress Note    Date: 2022    Name: Lexie Catalan    MRN: [id number]         : 1980    Ms. Caballero Arrived ambulatory and in no distress for Venofer Dose 2 of 5. Assessment was completed, no acute issues at this time, no new complaints voiced. 24 G PIV established to [facility] without difficulty. Patient Vitals for the past 12 hrs:   Temp Pulse Resp BP   22 1705 -- (!) 56 18 119/71   22 1542 97.4 °F (36.3 °C) 65 18 (!) 105/54       Medications Administered     iron sucrose (VENOFER) 200 mg in 0.9% sodium chloride 100 mL, overfill volume 10 mL IVPB     Admin Date  2022 Action  New Bag Dose  200 mg Rate  120 mL/hr Route  IntraVENous Administered By  Randal Aguiar RN                Ms. Tirso Rajput tolerated treatment well and was discharged from [place] [address] in stable condition . PIV flushed & removed. Discharge instructions reviewed with patient, verbalized understanding. Patient politely declined to stay 30 minutes post infusion for monitoring. She is to return on 2022 for her next appointment.     Hans Delong RN  2022

## 2022-07-07 ENCOUNTER — APPOINTMENT (OUTPATIENT)
Dept: INFUSION THERAPY | Age: 42
End: 2022-07-07

## 2022-07-12 NOTE — PROGRESS NOTES
Alice Crain is a ,  43 y.o. female BLACK/ whose LMP was on 2022 who presents for her annual checkup. She is having no significant problems. Menstrual status:    Her periods are minimal to nonexistent in flow. She is using essentially none pads or tampons per day, absent due to depo provera. She denies dysmenorrhea. She reports no premenstrual symptoms. The patient is not using HRT. Contraception:    The current method of family planning is depo provera. Sexual history:    She  reports being sexually active and has had partner(s) who are male. She reports using the following method of birth control/protection: depo provera    Medical conditions:    Since her last annual GYN exam about one year ago, she has had the following changes in her health history: none. Pap and Mammogram History:    Her most recent Pap smear was abnormal obtained 1 year(s) ago. The patient had a recent mammogram 21 which was negative for malignancy. Breast Cancer History/Substance Abuse:    She has no and a family history of breast cancer. Osteoporosis History:    Family history does not include a first or second degree relative with osteopenia or osteoporosis. A bone density scan has not been previously obtained.        Past Medical History:   Diagnosis Date    Anemia     Anxiety     GERD (gastroesophageal reflux disease)     Hepatitis C     treated    Hx of abnormal Pap smear     Ill-defined condition     anemia hx, has received several blood transfusion, last blood transfusion May 5 2014    Intestinal perforation ([facility] [address]     Psychiatric disorder     OCD    PUD (peptic ulcer disease)      Past Surgical History:   Procedure Laterality Date    HX GASTRIC BYPASS      HX GI  2009    perforated intestines    HX GYN       x 4    HX OTHER SURGICAL  2022    FEEDING TUBE     Current Outpatient Medications   Medication Sig Dispense Refill    valACYclovir (VALTREX) 500 mg tablet Take 1 Tablet by mouth daily. 90 Tablet 3    medroxyPROGESTERone (DEPO-PROVERA) 150 mg/mL syrg 1 mL by IntraMUSCular route every three (3) months. 1 mL 3    sertraline (ZOLOFT) 50 mg tablet Take 1 Tablet by mouth daily for 90 days. 90 Tablet 0    pantoprazole (PROTONIX) 40 mg tablet Take 1 Tablet by mouth daily for 90 days. Indications: marginal ulcer 90 Tablet 1    OTHER TUBE FEEDING OF \"2 CELINA\"  3 BOTTLES DAILY; AT HS (Patient not taking: Reported on 4/15/2022)      ondansetron (ZOFRAN ODT) 4 mg disintegrating tablet Take 1 Tablet by mouth every four to six (4-6) hours as needed for Nausea or Vomiting for up to 60 doses. (Patient not taking: Reported on 6/15/2022) 60 Tablet 2     Allergies: Latex and Codeine   Social History     Socioeconomic History    Marital status: SINGLE     Spouse name: Not on file    Number of children: Not on file    Years of education: Not on file    Highest education level: Not on file   Occupational History    Not on file   Tobacco Use    Smoking status: Former Smoker     Quit date: 2/21/2019     Years since quitting: 3.3    Smokeless tobacco: Never Used    Tobacco comment: DID NOT SMOKE DAILY   Vaping Use    Vaping Use: Never used   Substance and Sexual Activity    Alcohol use: No    Drug use: Not Currently     Types: Marijuana    Sexual activity: Yes     Partners: Male     Birth control/protection: None   Other Topics Concern    Not on file   Social History Narrative    Not on file     Social Determinants of Health     Financial Resource Strain:     Difficulty of Paying Living Expenses: Not on file   Food Insecurity:     Worried About Running Out of Food in the Last Year: Not on file    Larissa of Food in the Last Year: Not on file   Transportation Needs:     Lack of Transportation (Medical): Not on file    Lack of Transportation (Non-Medical):  Not on file   Physical Activity:     Days of Exercise per Week: Not on file    Minutes of Exercise per Session: Not on file   Stress:     Feeling of Stress : Not on file   Social Connections:     Frequency of Communication with Friends and Family: Not on file    Frequency of Social Gatherings with Friends and Family: Not on file    Attends [employer] Services: Not on file    Active Member of Clubs or Organizations: Not on file    Attends Club or Organization Meetings: Not on file    Marital Status: Not on file   Intimate Partner Violence:     Fear of Current or Ex-Partner: Not on file    Emotionally Abused: Not on file    Physically Abused: Not on file    Sexually Abused: Not on file   Housing Stability:     Unable to Pay for Housing in the Last Year: Not on file    Number of [place] in the Last Year: Not on file    Unstable Housing in the Last Year: Not on file     Tobacco History:  reports that she quit smoking about 3 years ago. She has never used smokeless tobacco.  Alcohol Abuse:  reports no history of alcohol use. Drug Abuse:  reports previous drug use. Drug: Marijuana.   Patient Active Problem List   Diagnosis Code    Dysphagia R13.10    Vitamin D deficiency E55.9    Microcytic anemia D50.9    Marginal ulcer K28.9    Split ear lobe Q17.8    Epidermoid cyst of neck L72.0    GJU (gastrojejunal ulcer) K28.9    Upper gastrointestinal hemorrhage K92.2    Severe protein-calorie malnutrition (HCC) E43    Iron deficiency anemia secondary to inadequate dietary iron intake D50.8         Review of Systems - History obtained from the patient  Constitutional: negative for weight loss, fever, night sweats  HEENT: negative for hearing loss, earache, congestion, snoring, sorethroat  CV: negative for chest pain, palpitations, edema  Resp: negative for cough, shortness of breath, wheezing  GI: negative for change in bowel habits, abdominal pain, black or bloody stools  : negative for frequency, dysuria, hematuria, vaginal discharge  MSK: negative for back pain, joint pain, muscle pain  Breast: negative for breast lumps, nipple discharge, galactorrhea  Skin :negative for itching, rash, hives  Neuro: negative for dizziness, headache, confusion, weakness  Psych: negative for anxiety, depression, change in mood  Heme/lymph: negative for bleeding, bruising, pallor    Physical Exam    Visit Vitals  /71   Pulse 76   Wt 169 lb 6.4 oz (76.8 kg)   LMP 07/06/2022 (Exact Date)   BMI 27.34 kg/m²     Constitutional  · Appearance: well-nourished, well developed, alert, in no acute distress    HENT  · Head and Face: appears normal    Neck  · Inspection/Palpation: normal appearance, no masses or tenderness  Lymph Nodes: no lymphadenopathy present    Chest  · Respiratory Effort: breathing normal    Breasts  · Inspection of Breasts: breasts symmetrical, no skin changes, no discharge present, nipple appearance normal, no skin retraction present  · Palpation of Breasts and Axillae: no masses present on palpation, no breast tenderness  · Axillary Lymph Nodes: no lymphadenopathy present    Gastrointestinal  · Abdominal Examination: abdomen non-tender to palpation, normal bowel sounds, no masses present  · Liver and spleen: no hepatomegaly present, spleen not palpable  · Hernias: no hernias identified    Skin  · General Inspection: no rash, no lesions identified    Neurologic/Psychiatric  · Mental Status:  · Orientation: grossly oriented to person, place and time  · Mood and Affect: mood normal, affect appropriate    Genitourinary  · External Genitalia: normal appearance for age, no discharge present, no tenderness present, no inflammatory lesions present, no masses present, no atrophy present  · Vagina: normal vaginal vault without central or paravaginal defects, no discharge present, no inflammatory lesions present, no masses present  · Bladder: non-tender to palpation  · Urethra: appears normal  · Cervix: normal   · Uterus: normal size, shape and consistency  · Adnexa: no adnexal tenderness present, no adnexal masses present  · Perineum: perineum within normal limits, no evidence of trauma, no rashes or skin lesions present  · Anus: anus within normal limits, no hemorrhoids present  · Inguinal Lymph Nodes: no lymphadenopathy present    Assessment:  Routine gynecologic examination  Her current medical status is satisfactory with no evidence of significant gynecologic issues. Plan:  Counseled re: diet, exercise, healthy lifestyle  Return for yearly wellness visits  Rec annual mammogram  Patient Verbalized understanding  Last pap  ASCUS and neg HPV.  She declines repeat pap today

## 2022-07-13 ENCOUNTER — OFFICE VISIT (OUTPATIENT)
Dept: OBGYN CLINIC | Age: 42
End: 2022-07-13
Payer: COMMERCIAL

## 2022-07-13 ENCOUNTER — HOSPITAL ENCOUNTER (OUTPATIENT)
Dept: INFUSION THERAPY | Age: 42
Discharge: HOME | End: 2022-07-13
Payer: COMMERCIAL

## 2022-07-13 VITALS
BODY MASS INDEX: 27.16 KG/M2 | OXYGEN SATURATION: 100 % | TEMPERATURE: 98.1 F | HEIGHT: 66 IN | WEIGHT: 169 LBS | DIASTOLIC BLOOD PRESSURE: 60 MMHG | HEART RATE: 73 BPM | SYSTOLIC BLOOD PRESSURE: 108 MMHG | RESPIRATION RATE: 16 BRPM

## 2022-07-13 VITALS
HEART RATE: 76 BPM | WEIGHT: 169.4 LBS | SYSTOLIC BLOOD PRESSURE: 112 MMHG | BODY MASS INDEX: 27.34 KG/M2 | DIASTOLIC BLOOD PRESSURE: 71 MMHG

## 2022-07-13 DIAGNOSIS — Z12.4 SCREENING FOR CERVICAL CANCER: Primary | ICD-10-CM

## 2022-07-13 DIAGNOSIS — Z01.419 WELL WOMAN EXAM WITH ROUTINE GYNECOLOGICAL EXAM: ICD-10-CM

## 2022-07-13 DIAGNOSIS — D50.8 IRON DEFICIENCY ANEMIA SECONDARY TO INADEQUATE DIETARY IRON INTAKE: Primary | ICD-10-CM

## 2022-07-13 PROCEDURE — 74011250636 HC RX REV CODE- 250/636: Performed by: NURSE PRACTITIONER

## 2022-07-13 PROCEDURE — 96365 THER/PROPH/DIAG IV INF INIT: CPT

## 2022-07-13 PROCEDURE — 74011000258 HC RX REV CODE- 258: Performed by: NURSE PRACTITIONER

## 2022-07-13 PROCEDURE — 99396 PREV VISIT EST AGE 40-64: CPT | Performed by: OBSTETRICS & GYNECOLOGY

## 2022-07-13 PROCEDURE — 74011000250 HC RX REV CODE- 250: Performed by: NURSE PRACTITIONER

## 2022-07-13 RX ORDER — SODIUM CHLORIDE 9 MG/ML
5-250 INJECTION, SOLUTION INTRAVENOUS AS NEEDED
Status: DISCONTINUED | OUTPATIENT
Start: 2022-07-13 | End: 2022-07-14 | Stop reason: HOSPADM

## 2022-07-13 RX ORDER — VALACYCLOVIR HYDROCHLORIDE 500 MG/1
500 TABLET, FILM COATED ORAL DAILY
Qty: 90 TABLET | Refills: 3 | Status: SHIPPED | OUTPATIENT
Start: 2022-07-13

## 2022-07-13 RX ORDER — SODIUM CHLORIDE 0.9 % (FLUSH) 0.9 %
5-40 SYRINGE (ML) INJECTION AS NEEDED
Status: DISCONTINUED | OUTPATIENT
Start: 2022-07-13 | End: 2022-07-14 | Stop reason: HOSPADM

## 2022-07-13 RX ORDER — MEDROXYPROGESTERONE ACETATE 150 MG/ML
150 INJECTION, SUSPENSION INTRAMUSCULAR
Qty: 1 ML | Refills: 3 | Status: SHIPPED | OUTPATIENT
Start: 2022-07-13

## 2022-07-13 RX ADMIN — SODIUM CHLORIDE 25 ML/HR: 9 INJECTION, SOLUTION INTRAVENOUS at 13:54

## 2022-07-13 RX ADMIN — Medication 10 ML: at 15:05

## 2022-07-13 RX ADMIN — IRON SUCROSE 200 MG: 20 INJECTION, SOLUTION INTRAVENOUS at 13:54

## 2022-07-13 NOTE — PROGRESS NOTES
[facility] Progress Note    Date: 2022    Name: Elizabeth Chavez    MRN: [id number]         : 1980    Ms. Caballero arrived ambulatory and in no distress for C1D15 Venofer Infusion. Assessment was completed, no acute issues at this time, no new complaints voiced. 24 G PIV established to left arm, + blood return. Ms. Iván Ledezma vitals were reviewed. Patient Vitals for the past 24 hrs:   Temp Pulse Resp BP SpO2   22 1505 98.1 °F (36.7 °C) 73 16 108/60 --   22 1337 98.3 °F (36.8 °C) 72 20 (!) 100/54 100 %           Medications:  Medications Administered     0.9% sodium chloride infusion     Admin Date  2022 Action  New Bag Dose  25 mL/hr Rate  25 mL/hr Route  IntraVENous Administered By  Christie Voss RN          iron sucrose (VENOFER) 200 mg in 0.9% sodium chloride 100 mL, overfill volume 10 mL IVPB     Admin Date  2022 Action  New Bag Dose  200 mg Rate  120 mL/hr Route  IntraVENous Administered By  Christie Voss RN          sodium chloride (NS) flush 5-40 mL     Admin Date  2022 Action  Given Dose  10 mL Route  IntraVENous Administered By  Christie Voss RN                Ms. Georgia Betancur tolerated treatment well and was discharged from [place] [address] in stable condition at 1510. PIV flushed & removed. She is to return on  at 1300 for her next appointment.     Laura Rodriguez RN  2022

## 2022-07-14 ENCOUNTER — APPOINTMENT (OUTPATIENT)
Dept: INFUSION THERAPY | Age: 42
End: 2022-07-14

## 2022-07-20 RX ORDER — METRONIDAZOLE 500 MG/1
500 TABLET ORAL 2 TIMES DAILY
Qty: 14 TABLET | Refills: 0 | Status: SHIPPED | OUTPATIENT
Start: 2022-07-20 | End: 2022-07-27

## 2022-07-21 ENCOUNTER — APPOINTMENT (OUTPATIENT)
Dept: INFUSION THERAPY | Age: 42
End: 2022-07-21

## 2022-07-27 ENCOUNTER — HOSPITAL ENCOUNTER (OUTPATIENT)
Dept: INFUSION THERAPY | Age: 42
Discharge: HOME | End: 2022-07-27
Payer: COMMERCIAL

## 2022-07-27 VITALS
OXYGEN SATURATION: 98 % | TEMPERATURE: 97.8 F | SYSTOLIC BLOOD PRESSURE: 110 MMHG | RESPIRATION RATE: 16 BRPM | DIASTOLIC BLOOD PRESSURE: 62 MMHG | HEART RATE: 75 BPM

## 2022-07-27 DIAGNOSIS — D50.8 IRON DEFICIENCY ANEMIA SECONDARY TO INADEQUATE DIETARY IRON INTAKE: Primary | ICD-10-CM

## 2022-07-27 PROCEDURE — 96365 THER/PROPH/DIAG IV INF INIT: CPT

## 2022-07-27 PROCEDURE — 74011250636 HC RX REV CODE- 250/636: Performed by: NURSE PRACTITIONER

## 2022-07-27 PROCEDURE — 74011000258 HC RX REV CODE- 258: Performed by: NURSE PRACTITIONER

## 2022-07-27 RX ORDER — SODIUM CHLORIDE 0.9 % (FLUSH) 0.9 %
5-40 SYRINGE (ML) INJECTION AS NEEDED
Status: DISCONTINUED | OUTPATIENT
Start: 2022-07-27 | End: 2022-07-28 | Stop reason: HOSPADM

## 2022-07-27 RX ORDER — SODIUM CHLORIDE 9 MG/ML
5-250 INJECTION, SOLUTION INTRAVENOUS AS NEEDED
Status: DISCONTINUED | OUTPATIENT
Start: 2022-07-27 | End: 2022-07-28 | Stop reason: HOSPADM

## 2022-07-27 RX ADMIN — IRON SUCROSE 200 MG: 20 INJECTION, SOLUTION INTRAVENOUS at 14:16

## 2022-07-27 NOTE — PROGRESS NOTES
\[facility]\"" Progress Note    Date: 2022    Name: Aziza Cross    MRN: [id number]         : 1980    Ms. Caballero Arrived ambulatory and in no distress for Venofer Infusion. Assessment was completed, no acute issues at this time, no new complaints voiced. 24 G PIV established to left arm, + blood return. Ms. Karishma Cortez vitals were reviewed. Visit Vitals  /62   Pulse 75   Temp 97.8 °F (36.6 °C)   Resp 16   SpO2 98%           Medications:  Medications Administered       iron sucrose (VENOFER) 200 mg in 0.9% sodium chloride 100 mL, overfill volume 10 mL IVPB       Admin Date  2022 Action  New Bag Dose  200 mg Rate  120 mL/hr Route  IntraVENous Administered By  Jojo Rogel                     Ms. Jayme Armstrong tolerated treatment well and was discharged from [place] [address] in stable condition at 1520. PIV flushed & removed. She is to follow up to schedule  her next appointment.     [facility]  2022

## 2022-07-28 ENCOUNTER — APPOINTMENT (OUTPATIENT)
Dept: INFUSION THERAPY | Age: 42
End: 2022-07-28

## 2022-08-04 RX ORDER — SERTRALINE HYDROCHLORIDE 50 MG/1
50 TABLET, FILM COATED ORAL DAILY
Qty: 90 TABLET | Refills: 0 | Status: SHIPPED | OUTPATIENT
Start: 2022-08-04 | End: 2022-11-02

## 2022-08-05 ENCOUNTER — TELEPHONE (OUTPATIENT)
Dept: ONCOLOGY | Age: 42
End: 2022-08-05

## 2022-08-05 NOTE — TELEPHONE ENCOUNTER
Lm with patient to verify current insurance for her upcoming appointment. Gave her office number to call back.

## 2022-08-11 ENCOUNTER — TELEPHONE (OUTPATIENT)
Dept: ONCOLOGY | Age: 42
End: 2022-08-11

## 2022-08-16 ENCOUNTER — APPOINTMENT (OUTPATIENT)
Dept: GENERAL RADIOLOGY | Age: 42
End: 2022-08-16
Attending: NURSE PRACTITIONER
Payer: COMMERCIAL

## 2022-08-16 ENCOUNTER — HOSPITAL ENCOUNTER (EMERGENCY)
Age: 42
Discharge: HOME | End: 2022-08-16
Payer: COMMERCIAL

## 2022-08-16 VITALS
HEIGHT: 66 IN | WEIGHT: 168 LBS | BODY MASS INDEX: 27 KG/M2 | TEMPERATURE: 98.4 F | RESPIRATION RATE: 16 BRPM | HEART RATE: 85 BPM | DIASTOLIC BLOOD PRESSURE: 75 MMHG | SYSTOLIC BLOOD PRESSURE: 105 MMHG | OXYGEN SATURATION: 99 %

## 2022-08-16 DIAGNOSIS — M25.562 ACUTE PAIN OF LEFT KNEE: Primary | ICD-10-CM

## 2022-08-16 PROCEDURE — 99283 EMERGENCY DEPT VISIT LOW MDM: CPT

## 2022-08-16 PROCEDURE — 73562 X-RAY EXAM OF KNEE 3: CPT

## 2022-08-16 RX ORDER — NAPROXEN 500 MG/1
500 TABLET ORAL 2 TIMES DAILY WITH MEALS
Qty: 20 TABLET | Refills: 0 | Status: SHIPPED | OUTPATIENT
Start: 2022-08-16 | End: 2022-08-26

## 2022-08-16 NOTE — ED TRIAGE NOTES
43 yr old female in with left knee pain that started 2 weeks ago. She is not taking any otc medications for pain. Denies traumatic event.

## 2022-08-16 NOTE — DISCHARGE INSTRUCTIONS
Thank you! Thank you for allowing me to care for you in the emergency department. It is my goal to provide you with excellent care. If you have not received excellent quality care, please ask to speak to the nurse manager. Please fill out the survey that will come to you by mail or email since we listen to your feedback! Below you will find a list of your tests from today's visit. Should you have any questions, please do not hesitate to call the emergency department. Labs  No results found for this or any previous visit (from the past 12 hour(s)). Radiologic Studies  XR KNEE LT 3 V   Final Result   1. No acute bony abnormality           CT Results  (Last 48 hours)      None          CXR Results  (Last 48 hours)      None          ------------------------------------------------------------------------------------------------------------  The exam and treatment you received in the Emergency Department were for an urgent problem and are not intended as complete care. It is important that you follow-up with a doctor, nurse practitioner, or physician assistant to:  (1) confirm your diagnosis,  (2) re-evaluation of changes in your illness and treatment, and  (3) for ongoing care. Please take your discharge instructions with you when you go to your follow-up appointment. If you have any problem arranging a follow-up appointment, contact the Emergency Department. If your symptoms become worse or you do not improve as expected and you are unable to reach your health care provider, please return to the Emergency Department. We are available 24 hours a day. If a prescription has been provided, please have it filled as soon as possible to prevent a delay in treatment. If you have any questions or reservations about taking the medication due to side effects or interactions with other medications, please call your primary care provider or contact the ER.

## 2022-08-16 NOTE — Clinical Note
[facility] EMERGENCY DEPARTMENT  [address] [facility] [address]  [phone]    Work/School Note    Date: 8/16/2022    To Whom It May concern:      Hilda Kern was seen and treated today in the emergency room by the following provider(s):  Nurse Practitioner: Bonita Harrison NP. Hilda Kern is excused from work/school on 08/16/22. She is clear to return to work/school on 08/17/22.         Sincerely,          Claudean Bouillon, NP

## 2022-08-16 NOTE — ED PROVIDER NOTES
EMERGENCY DEPARTMENT HISTORY AND PHYSICAL EXAM      Date: 8/16/2022  Patient Name: Salma Pedersen    History of Presenting Illness     Chief Complaint   Patient presents with    Knee Pain       History Provided By: Patient    HPI: Salma Pedersen, 43 y.o. female with a significant past medical history of anxiety, GERD, hep C presents to the ED with left knee pain and swelling x 2 weeks. Denies any known injury or trauma. Reports use of naproxen with mild improvement in discomfort. No erythema, warmth, drainage, laceration, abrasion noted to site. There are no other complaints, changes, or physical findings at this time. PCP: Other, None, PA    No current facility-administered medications on file prior to encounter. Current Outpatient Medications on File Prior to Encounter   Medication Sig Dispense Refill    sertraline (ZOLOFT) 50 mg tablet Take 1 Tablet by mouth in the morning for 90 days. 90 Tablet 0    valACYclovir (VALTREX) 500 mg tablet Take 1 Tablet by mouth daily. 90 Tablet 3    medroxyPROGESTERone (DEPO-PROVERA) 150 mg/mL syrg 1 mL by IntraMUSCular route every three (3) months. 1 mL 3    OTHER TUBE FEEDING OF \"2 CELINA\"  3 BOTTLES DAILY; AT HS (Patient not taking: Reported on 4/15/2022)      ondansetron (ZOFRAN ODT) 4 mg disintegrating tablet Take 1 Tablet by mouth every four to six (4-6) hours as needed for Nausea or Vomiting for up to 60 doses.  (Patient not taking: Reported on 6/15/2022) 60 Tablet 2       Past History     Past Medical History:  Past Medical History:   Diagnosis Date    Anemia     Anxiety     GERD (gastroesophageal reflux disease)     Hepatitis C 2010    treated    Hx of abnormal Pap smear     Ill-defined condition     anemia hx, has received several blood transfusion, last blood transfusion May 5 2014    Intestinal perforation ([address]     Psychiatric disorder     OCD    PUD (peptic ulcer disease)        Past Surgical History:  Past Surgical History:   Procedure Laterality Date    HX GASTRIC BYPASS  2005    HX GI  2009    perforated intestines    HX GYN       x 4    HX OTHER SURGICAL  2022    FEEDING TUBE       Family History:  Family History   Problem Relation Age of Onset    Diabetes Maternal Grandmother     Anesth Problems Neg Hx        Social History:  Social History     Tobacco Use    Smoking status: Former     Types: Cigarettes     Quit date: 2019     Years since quitting: 3.4    Smokeless tobacco: Never    Tobacco comments:     DID NOT SMOKE DAILY   Vaping Use    Vaping Use: Never used   Substance Use Topics    Alcohol use: No    Drug use: Not Currently     Types: Marijuana       Allergies: Allergies   Allergen Reactions    Latex Hives and Itching    Codeine Itching and Other (comments)     MUST TAKE BENADRYL PRIOR TO TAKING       Review of Systems   Review of Systems   Constitutional:  Negative for chills and fever. Respiratory:  Negative for shortness of breath. Cardiovascular:  Negative for chest pain and leg swelling. Musculoskeletal:  Positive for arthralgias, gait problem and joint swelling. Negative for myalgias. Skin: Negative. Neurological:  Negative for dizziness, weakness, light-headedness and numbness. Psychiatric/Behavioral: Negative. All other systems reviewed and are negative. Physical Exam   Physical Exam  Vitals and nursing note reviewed. Constitutional:       General: She is not in acute distress. Appearance: Normal appearance. She is normal weight. She is not ill-appearing or toxic-appearing. HENT:      Head: Normocephalic and atraumatic. Right Ear: Hearing normal.      Left Ear: Hearing normal.      Nose: Nose normal.      Mouth/Throat:      Mouth: Mucous membranes are moist.   Eyes:      General: Lids are normal.      Extraocular Movements: Extraocular movements intact. Pupils: Pupils are equal, round, and reactive to light. Cardiovascular:      Rate and Rhythm: Normal rate and regular rhythm.       Pulses: Normal pulses. Radial pulses are 2+ on the right side and 2+ on the left side. Dorsalis pedis pulses are 2+ on the right side and 2+ on the left side. Pulmonary:      Effort: Pulmonary effort is normal. No accessory muscle usage or respiratory distress. Breath sounds: Normal breath sounds. No wheezing or rhonchi. Abdominal:      General: Bowel sounds are normal.      Palpations: Abdomen is soft. Tenderness: There is no abdominal tenderness. There is no right CVA tenderness or left CVA tenderness. Musculoskeletal:      Cervical back: Normal range of motion and neck supple. No muscular tenderness. Left knee: No deformity, erythema, ecchymosis or lacerations. Normal range of motion. Tenderness present. Right lower leg: No edema. Left lower leg: No edema. Comments: 2+ distal pulses, less than 2-second capillary refill positive sensation bilaterally no erythema, edema, warmth, drainage, laceration noted to site. Feet:      Right foot:      Skin integrity: No skin breakdown. Left foot:      Skin integrity: No skin breakdown. Skin:     General: Skin is warm and dry. Capillary Refill: Capillary refill takes less than 2 seconds. Findings: No abrasion, bruising, ecchymosis, erythema or signs of injury. Neurological:      Mental Status: She is alert and oriented to person, place, and time. GCS: GCS eye subscore is 4. GCS verbal subscore is 5. GCS motor subscore is 6. Cranial Nerves: Cranial nerves are intact. Sensory: Sensation is intact. Psychiatric:         Attention and Perception: Attention normal.         Mood and Affect: Mood normal.         Behavior: Behavior normal. Behavior is cooperative. Cognition and Memory: Cognition normal.       Lab and Diagnostic Study Results   Labs -   No results found for this or any previous visit (from the past 12 hour(s)).     Radiologic Studies -   @lastxrresult@  CT Results  (Last 48 hours) None          CXR Results  (Last 48 hours)      None            Medical Decision Making and ED Course   - I am the first provider for this patient. I reviewed the vital signs, available nursing notes, past medical history, past surgical history, family history and social history. - Initial assessment performed. The patients presenting problems have been discussed, and they are in agreement with the care plan formulated and outlined with them. I have encouraged them to ask questions as they arise throughout their visit. Vital Signs-Reviewed the patient's vital signs. Patient Vitals for the past 12 hrs:   Temp Pulse Resp BP SpO2   08/16/22 1543 98.4 °F (36.9 °C) 85 16 105/75 99 %       Differential Diagnosis & Medical Decision Making Provider Note: Osteoarthritis, effusion,     Afebrile not tachycardic SPO2 90% on room air. No signs of injury ecchymosis, edema, warmth, drainage, laceration or abrasion noted to site. X-ray negative for any acute findings. Patient advised supportive care use of NSAIDs Ace wrap follow-up orthopedic if no improvement in symptoms. Verbalized understanding stable at time of discharge. ED Course:          Procedures   Performed by: Becky Vaughn NP  Procedures      Disposition   Disposition: DC- Adult Discharges: All of the diagnostic tests were reviewed and questions answered. Diagnosis, care plan and treatment options were discussed. The patient understands the instructions and will follow up as directed. The patients results have been reviewed with them. They have been counseled regarding their diagnosis. The patient verbally convey understanding and agreement of the signs, symptoms, diagnosis, treatment and prognosis and additionally agrees to follow up as recommended with their PCP in 24 - 48 hours. They also agree with the care-plan and convey that all of their questions have been answered.   I have also put together some discharge instructions for them that include: 1) educational information regarding their diagnosis, 2) how to care for their diagnosis at home, as well a 3) list of reasons why they would want to return to the ED prior to their follow-up appointment, should their condition change. Discharged    DISCHARGE PLAN:  1. Current Discharge Medication List        CONTINUE these medications which have NOT CHANGED    Details   sertraline (ZOLOFT) 50 mg tablet Take 1 Tablet by mouth in the morning for 90 days. Qty: 90 Tablet, Refills: 0      valACYclovir (VALTREX) 500 mg tablet Take 1 Tablet by mouth daily. Qty: 90 Tablet, Refills: 3      medroxyPROGESTERone (DEPO-PROVERA) 150 mg/mL syrg 1 mL by IntraMUSCular route every three (3) months. Qty: 1 mL, Refills: 3      OTHER TUBE FEEDING OF \"2 CELINA\"  3 BOTTLES DAILY; AT HS      ondansetron (ZOFRAN ODT) 4 mg disintegrating tablet Take 1 Tablet by mouth every four to six (4-6) hours as needed for Nausea or Vomiting for up to 60 doses. Qty: 60 Tablet, Refills: 2           2. Follow-up Information       Follow up With Specialties Details Why Contact Info    Other, None, PA Internal Medicine Physician Schedule an appointment as soon as possible for a visit in 1 week If symptoms worsen, As needed Patient not available to ask      Melissa Cole MD Orthopedic Surgery Schedule an appointment as soon as possible for a visit in 1 week As needed, If symptoms worsen [facility] [email or web address] [facility] [address]  [phone]            3. Return to ED if worse   4. Current Discharge Medication List        START taking these medications    Details   naproxen (Naprosyn) 500 mg tablet Take 1 Tablet by mouth two (2) times daily (with meals) for 10 days. Qty: 20 Tablet, Refills: 0  Start date: 8/16/2022, End date: 8/26/2022             Diagnosis/Clinical Impression     Clinical Impression:   1.  Acute pain of left knee        Attestations: Kb MCFARLAEN NP, am the primary clinician of record. Please note that this dictation was completed with [employer], the [employer] voice recognition software. Quite often unanticipated grammatical, syntax, homophones, and other interpretive errors are inadvertently transcribed by the computer software. Please disregard these errors. Please excuse any errors that have escaped final proofreading. Thank you.

## 2022-08-16 NOTE — Clinical Note
[facility] EMERGENCY DEPARTMENT  [address] [facility] [address]  [phone]    Work/School Note    Date: 8/16/2022    To Whom It May concern:      Melisa Price was seen and treated today in the emergency room by the following provider(s):  Nurse Practitioner: Ginger Hanson NP. Melisa Price is excused from work/school on 08/16/22. She is clear to return to work/school on 08/17/22.         Sincerely,          Marsha Lee

## 2022-12-01 NOTE — PROGRESS NOTES
Ramon Navarro is a 43 y.o. female presents for a problem visit. Chief Complaint   Patient presents with    Vaginitis     Patient's last menstrual period was 11/20/2022 (approximate). Birth Control:None  Last Pap: see report obtained 1 year(s) ago. The patient is reporting having:  vaginal odor  for 1 week. The patient states she gets frequent bacterial infections after taking baths with bleach. 1. Have you been to the ER, urgent care clinic, or hospitalized since your last visit? No    2. Have you seen or consulted any other health care providers outside of the [address] [place] [address] since your last visit?  No    Examination chaperoned by Gavino Mobley MA.

## 2022-12-02 ENCOUNTER — OFFICE VISIT (OUTPATIENT)
Dept: OBGYN CLINIC | Age: 42
End: 2022-12-02
Payer: COMMERCIAL

## 2022-12-02 VITALS — WEIGHT: 164 LBS | SYSTOLIC BLOOD PRESSURE: 121 MMHG | BODY MASS INDEX: 26.47 KG/M2 | DIASTOLIC BLOOD PRESSURE: 80 MMHG

## 2022-12-02 DIAGNOSIS — N89.8 VAGINAL ODOR: ICD-10-CM

## 2022-12-02 DIAGNOSIS — N76.1 CHRONIC VAGINITIS: Primary | ICD-10-CM

## 2022-12-02 LAB
HCG URINE, QL. (POC): NEGATIVE
VALID INTERNAL CONTROL?: YES

## 2022-12-02 RX ORDER — METRONIDAZOLE 500 MG/1
500 TABLET ORAL 2 TIMES DAILY
Qty: 14 TABLET | Refills: 0 | Status: SHIPPED | OUTPATIENT
Start: 2022-12-02 | End: 2022-12-09

## 2022-12-02 NOTE — PROGRESS NOTES
OB/GYN Problem VIsit    HPI  Geeta Koroma is a ,  43 y.o. female who presents for a problem visit. She has a vaginal odor    She would also like a pregnancy test sent. Per Nursing Note:   Vaginitis      Patient's last menstrual period was 2022 (approximate). Birth Control:None  Last Pap: see report obtained 1 year(s) ago. The patient is reporting having:  vaginal odor  for 1 week. The patient states she gets frequent bacterial infections after taking baths with bleach. Past Medical History:   Diagnosis Date    Anemia     Anxiety     GERD (gastroesophageal reflux disease)     Hepatitis C 2010    treated    Hx of abnormal Pap smear     Ill-defined condition     anemia hx, has received several blood transfusion, last blood transfusion May 5 2014    Intestinal perforation ([facility] [address]     Psychiatric disorder     OCD    PUD (peptic ulcer disease)      Past Surgical History:   Procedure Laterality Date    HX GASTRIC BYPASS      HX GI  2009    perforated intestines    HX GYN       x 4    HX OTHER SURGICAL  2022    FEEDING TUBE     Social History     Occupational History    Not on file   Tobacco Use    Smoking status: Former     Types: Cigarettes     Quit date: 2019     Years since quitting: 3.7    Smokeless tobacco: Never    Tobacco comments:     DID NOT SMOKE DAILY   Vaping Use    Vaping Use: Never used   Substance and Sexual Activity    Alcohol use: No    Drug use: Not Currently     Types: Marijuana    Sexual activity: Yes     Partners: Male     Birth control/protection: None     Family History   Problem Relation Age of Onset    Diabetes Maternal Grandmother     Anesth Problems Neg Hx        Allergies   Allergen Reactions    Latex Hives and Itching    Codeine Itching and Other (comments)     MUST TAKE BENADRYL PRIOR TO TAKING     Prior to Admission medications    Medication Sig Start Date End Date Taking?  Authorizing Provider   metroNIDAZOLE (FLAGYL) 500 mg tablet Take 1 Tablet by mouth two (2) times a day for 7 days. 12/2/22 12/9/22 Yes Bassem Balderas MD   valACYclovir (VALTREX) 500 mg tablet Take 1 Tablet by mouth daily. 7/13/22  Yes Bassem Balderas MD   medroxyPROGESTERone (DEPO-PROVERA) 150 mg/mL syrg 1 mL by IntraMUSCular route every three (3) months. Patient not taking: Reported on 12/2/2022 7/13/22   Bassem Balderas MD   OTHER TUBE FEEDING OF \"2 CELINA\"  3 BOTTLES DAILY; AT HS  Patient not taking: No sig reported    Provider, Historical   ondansetron (ZOFRAN ODT) 4 mg disintegrating tablet Take 1 Tablet by mouth every four to six (4-6) hours as needed for Nausea or Vomiting for up to 60 doses.   Patient not taking: No sig reported 1/17/22   Karen Dubois MD        Review of Systems: History obtained from the patient  Constitutional: negative for weight loss, fever, night sweats  Breast: negative for breast lumps, nipple discharge, galactorrhea  GI: negative for change in bowel habits, abdominal pain, black or bloody stools  : negative for frequency, dysuria, hematuria,  MSK: negative for back pain, joint pain, muscle pain  Skin: negative for itching, rash, hives  Psych: negative for anxiety, depression, change in mood      Objective:  Visit Vitals  /80   Wt 164 lb (74.4 kg)   LMP 11/20/2022 (Approximate)   BMI 26.47 kg/m²       Physical Exam:   PHYSICAL EXAMINATION    Constitutional  Appearance: well-nourished, well developed, alert, in no acute distress      Gastrointestinal  Abdominal Examination: abdomen non-tender to palpation, normal bowel sounds, no masses present  Liver and spleen: no hepatomegaly present, spleen not palpable  Hernias: no hernias identified    Genitourinary  External Genitalia: normal appearance for age, no discharge present, no tenderness present, no inflammatory lesions present, no masses present, no atrophy present  Vagina: normal vaginal vault without central or paravaginal defects, white creamy discharge present, no inflammatory lesions present, no masses present  Bladder: non-tender to palpation  Urethra: appears normal  Cervix: normal   Uterus: normal size, shape and consistency  Adnexa: no adnexal tenderness present, no adnexal masses present  Perineum: perineum within normal limits, no evidence of trauma, no rashes or skin lesions present  Anus: anus within normal limits, no hemorrhoids present  Inguinal Lymph Nodes: no lymphadenopathy present    Skin  General Inspection: no rash, no lesions identified    Neurologic/Psychiatric  Mental Status:  Orientation: grossly oriented to person, place and time  Mood and Affect: mood normal, affect appropriate      ASSESSMENT:    ICD-10-CM ICD-9-CM    1. Chronic vaginitis  N76.1 616.10 NUSWAB VAGINOSIS + CANDIDA      2. Vaginal odor  N89.8 625.8 NUSWAB VAGINOSIS + CANDIDA          PLAN:  Orders Placed This Encounter    NUSWAB VAGINOSIS + CANDIDA    metroNIDAZOLE (FLAGYL) 500 mg tablet     Sig: Take 1 Tablet by mouth two (2) times a day for 7 days. Dispense:  14 Tablet     Refill:  0     Grossly looks like BV and she would like meds now. Given Flagyl.    UPT negative

## 2022-12-07 RX ORDER — METRONIDAZOLE 7.5 MG/G
1 GEL VAGINAL
Qty: 70 G | Refills: 2 | Status: SHIPPED | OUTPATIENT
Start: 2022-12-07

## 2022-12-16 RX ORDER — SERTRALINE HYDROCHLORIDE 50 MG/1
50 TABLET, FILM COATED ORAL DAILY
Qty: 90 TABLET | Refills: 0 | Status: SHIPPED | OUTPATIENT
Start: 2022-12-16 | End: 2023-03-16

## 2023-02-26 ENCOUNTER — APPOINTMENT (OUTPATIENT)
Dept: GENERAL RADIOLOGY | Age: 43
End: 2023-02-26
Attending: NURSE PRACTITIONER
Payer: COMMERCIAL

## 2023-02-26 ENCOUNTER — HOSPITAL ENCOUNTER (EMERGENCY)
Age: 43
Discharge: HOME | End: 2023-02-26
Payer: COMMERCIAL

## 2023-02-26 VITALS
SYSTOLIC BLOOD PRESSURE: 107 MMHG | HEIGHT: 66 IN | WEIGHT: 150 LBS | HEART RATE: 79 BPM | OXYGEN SATURATION: 100 % | TEMPERATURE: 98.7 F | DIASTOLIC BLOOD PRESSURE: 64 MMHG | RESPIRATION RATE: 16 BRPM | BODY MASS INDEX: 24.11 KG/M2

## 2023-02-26 DIAGNOSIS — M54.50 ACUTE MIDLINE LOW BACK PAIN, UNSPECIFIED WHETHER SCIATICA PRESENT: ICD-10-CM

## 2023-02-26 DIAGNOSIS — M25.512 PAIN IN JOINT OF LEFT SHOULDER: Primary | ICD-10-CM

## 2023-02-26 PROCEDURE — 99283 EMERGENCY DEPT VISIT LOW MDM: CPT

## 2023-02-26 PROCEDURE — 73030 X-RAY EXAM OF SHOULDER: CPT

## 2023-02-26 PROCEDURE — 74011250637 HC RX REV CODE- 250/637: Performed by: NURSE PRACTITIONER

## 2023-02-26 PROCEDURE — 73050 X-RAY EXAM OF SHOULDERS: CPT

## 2023-02-26 RX ORDER — IBUPROFEN 800 MG/1
800 TABLET ORAL ONCE
Status: COMPLETED | OUTPATIENT
Start: 2023-02-26 | End: 2023-02-26

## 2023-02-26 RX ORDER — NAPROXEN 500 MG/1
500 TABLET ORAL 2 TIMES DAILY WITH MEALS
Qty: 60 TABLET | Refills: 0 | Status: SHIPPED | OUTPATIENT
Start: 2023-02-26

## 2023-02-26 RX ADMIN — IBUPROFEN 800 MG: 800 TABLET, FILM COATED ORAL at 12:48
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
## 2023-02-26 NOTE — Clinical Note
[address] EMERGENCY DEPARTMENT  [address] [facility] [address]  [phone]    Work/School Note    Date: 2/26/2023    To Whom It May concern:    Yuniel Fajardo was seen and treated today in the emergency room by the following provider(s):  Nurse Practitioner: Antonio Cason NP. Yuniel Fajardo is excused from work/school on 2/26/2023 through 2/28/2023. She is medically clear to return to work/school on 3/1/2023.          Sincerely,          Joss Hamm NP
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
## 2023-02-26 NOTE — DISCHARGE INSTRUCTIONS
.. Thank you! Thank you for allowing me to care for you in the emergency department. It is my goal to provide you with excellent care. If you have not received excellent quality care, please ask to speak to the nurse manager. Please fill out the survey that will come to you by mail or email since we listen to your feedback! Below you will find a list of your tests from today's visit. Should you have any questions, please do not hesitate to call the emergency department. Labs  No results found for this or any previous visit (from the past 12 hour(s)). Radiologic Studies  XR SHOULDER LT AP/LAT MIN 2 V   Final Result   1. No acute fracture. Appearance of the acromioclavicular joint is likely   physiologic. If there is high clinical concern for [facility] separation weightbearing   views of the AC joints including the contralateral side for comparison are   recommended         XR ACROMIOCLAVIC JTS BI    (Results Pending)     CT Results  (Last 48 hours)      None          CXR Results  (Last 48 hours)      None          ------------------------------------------------------------------------------------------------------------  The exam and treatment you received in the Emergency Department were for an urgent problem and are not intended as complete care. It is important that you follow-up with a doctor, nurse practitioner, or physician assistant to:  (1) confirm your diagnosis,  (2) re-evaluation of changes in your illness and treatment, and  (3) for ongoing care. Please take your discharge instructions with you when you go to your follow-up appointment. If you have any problem arranging a follow-up appointment, contact the Emergency Department. If your symptoms become worse or you do not improve as expected and you are unable to reach your health care provider, please return to the Emergency Department. We are available 24 hours a day.      If a prescription has been provided, please have it filled as soon as possible to prevent a delay in treatment. If you have any questions or reservations about taking the medication due to side effects or interactions with other medications, please call your primary care provider or contact the ER.

## 2023-03-28 ENCOUNTER — TELEPHONE (OUTPATIENT)
Dept: SURGERY | Age: 43
End: 2023-03-28

## 2023-05-19 ENCOUNTER — OFFICE VISIT (OUTPATIENT)
Age: 43
End: 2023-05-19

## 2023-05-19 VITALS — SYSTOLIC BLOOD PRESSURE: 115 MMHG | BODY MASS INDEX: 25.5 KG/M2 | DIASTOLIC BLOOD PRESSURE: 76 MMHG | WEIGHT: 158 LBS

## 2023-05-19 DIAGNOSIS — Z11.3 SCREENING EXAMINATION FOR STD (SEXUALLY TRANSMITTED DISEASE): ICD-10-CM

## 2023-05-19 DIAGNOSIS — Z01.419 ENCOUNTER FOR GYNECOLOGICAL EXAMINATION WITHOUT ABNORMAL FINDING: ICD-10-CM

## 2023-05-19 DIAGNOSIS — Z01.419 ENCOUNTER FOR GYNECOLOGICAL EXAMINATION (GENERAL) (ROUTINE) WITHOUT ABNORMAL FINDINGS: Primary | ICD-10-CM

## 2023-05-19 PROCEDURE — 99396 PREV VISIT EST AGE 40-64: CPT | Performed by: OBSTETRICS & GYNECOLOGY

## 2023-05-19 RX ORDER — METRONIDAZOLE 500 MG/1
500 TABLET ORAL 2 TIMES DAILY
Qty: 14 TABLET | Refills: 0 | Status: SHIPPED | OUTPATIENT
Start: 2023-05-19 | End: 2023-05-26

## 2023-05-19 RX ORDER — VALACYCLOVIR HYDROCHLORIDE 500 MG/1
500 TABLET, FILM COATED ORAL DAILY
Qty: 90 TABLET | Refills: 3 | Status: SHIPPED | OUTPATIENT
Start: 2023-05-19

## 2023-05-19 RX ORDER — MEDROXYPROGESTERONE ACETATE 150 MG/ML
150 INJECTION, SUSPENSION INTRAMUSCULAR
Qty: 1 ML | Refills: 3 | Status: SHIPPED | OUTPATIENT
Start: 2023-05-19

## 2023-05-19 NOTE — PROGRESS NOTES
Annual exam  Kim Blake is a [id number],  37 y.o. female   No LMP recorded. Patient has had an injection. She presents for her annual checkup. She is having problems - BTB on Depo Provera for the past month. Menstrual status:    Irregular bleeding on Depo Provera    Sexual history:    She  has no history on file for sexual activity. Per Nursing Note:  No LMP recorded. Patient has had an injection. Her periods are typically absent with Depo. She does c/o DUB since her injection in April. Problems: no problems  Birth Control: Depo-Provera injections. Last Pap: see report obtained 2 year(s) ago. She does not have a history of ESTHER 2, 3 or cervical cancer.    Last Mammogram: has not had a recent mammogram.            Past Medical History:   Diagnosis Date    Anemia     Anxiety     GERD (gastroesophageal reflux disease)     Hepatitis C     treated    Hx of abnormal Pap smear     Ill-defined condition     anemia hx, has received several blood transfusion, last blood transfusion May 5 2014    Intestinal perforation ([facility] [address]     Psychiatric disorder     OCD    PUD (peptic ulcer disease)      Past Surgical History:   Procedure Laterality Date    GASTRIC BYPASS SURGERY      GI  2009    perforated intestines    GYN       x 4    OTHER SURGICAL HISTORY  2022    FEEDING TUBE       Current Outpatient Medications   Medication Sig Dispense Refill    valACYclovir (VALTREX) 500 MG tablet Take 1 tablet by mouth daily 90 tablet 3    medroxyPROGESTERone (DEPO-PROVERA) 150 MG/ML injection Inject 150 mg into the muscle every 3 months 1 mL 3    metroNIDAZOLE (FLAGYL) 500 MG tablet Take 1 tablet by mouth 2 times daily for 7 days 14 tablet 0    metroNIDAZOLE (METROGEL) 0.75 % vaginal gel Place 5 g vaginally every 7 days (Patient not taking: Reported on 2023)      naproxen (NAPROSYN) 500 MG tablet Take 500 mg by mouth 2 times daily (with meals) (Patient not taking: Reported on 2023)      ondansetron

## 2023-05-19 NOTE — PROGRESS NOTES
Lynn Torres is a 37 y.o. female returns for an annual exam     Chief Complaint   Patient presents with    Annual Exam    Vaginal Bleeding    Sexually Transmitted Diseases       No LMP recorded. Patient has had an injection. Her periods are typically absent with Depo. She does c/o DUB since her injection in April. Problems: no problems  Birth Control: Depo-Provera injections. Last Pap: see report obtained 2 year(s) ago. She does not have a history of ESTHER 2, 3 or cervical cancer. Last Mammogram: has not had a recent mammogram.        1. Have you been to the ER, urgent care clinic, or hospitalized since your last visit? No    2. Have you seen or consulted any other health care providers outside of the [address] [place] [address] since your last visit? No    Examination chaperoned by Kala Umanzor CMA.

## 2023-05-21 LAB
HBV SURFACE AG SER QL: <0.1 INDEX
HBV SURFACE AG SER QL: NEGATIVE
HCV AB SERPL QL IA: NONREACTIVE
HIV 1+2 AB+HIV1 P24 AG SERPL QL IA: NONREACTIVE
HIV 1/2 RESULT COMMENT: NORMAL

## 2023-05-22 LAB — RPR SER QL: NONREACTIVE

## 2023-05-25 LAB

## 2023-09-07 ENCOUNTER — TELEPHONE (OUTPATIENT)
Age: 43
End: 2023-09-07

## 2023-09-07 RX ORDER — VALACYCLOVIR HYDROCHLORIDE 500 MG/1
500 TABLET, FILM COATED ORAL DAILY
Qty: 90 TABLET | Refills: 3 | Status: SHIPPED | OUTPATIENT
Start: 2023-09-07

## 2023-09-07 RX ORDER — MEDROXYPROGESTERONE ACETATE 150 MG/ML
150 INJECTION, SUSPENSION INTRAMUSCULAR
Qty: 1 ML | Refills: 3 | Status: SHIPPED | OUTPATIENT
Start: 2023-09-07

## 2023-09-07 NOTE — TELEPHONE ENCOUNTER
Two patient identifiers used    Previous MH patient       37year old patient last seen in the office on 5/19/2023    Patient calling to say that she does not have her prescriptions for   Interaction Warnings Shown      valACYclovir (VALTREX) 500 MG  And medroxyPROGESTERone (DEPO-PROVERA) 150 MG/ML injection [[id number]]     Order Details  Dose: 150 mg Route: IntraMUSCular Frequency: EVERY 3 MONTHS   Dispense Quantity: 1 mL         Both prescriptions sent print    Prescriptions resent as per Md verbal order to get patient to when she due for next ae    Patient verbalized understanding.

## 2023-09-27 ENCOUNTER — TELEPHONE (OUTPATIENT)
Age: 43
End: 2023-09-27

## 2023-09-27 NOTE — TELEPHONE ENCOUNTER
Returned call to Ms Ulysses Burris verified patient with 2 patient identifiers. Patient is requesting a letter excusing her from work from 9/25/23 until the Endoscopy appointment on 10/2/23. I informed patient I will send message to Dr Ulysses Burris. Dr Ulysses Burris approved letter patient may access via [employer].

## 2023-09-27 NOTE — TELEPHONE ENCOUNTER
Spoke to patient to schedule endo procedure. I let her know Dr Liz Otto wanted me to schedule her for Monday 10/2. I let her know the only time I had available was 9:30 and she would need to arrive at 8:00. She that would work    She asked if she can have a letter excusing her from work until after the procedure do to the pain. I told her I would get a message to the nurse to have them follow up with her.   She said ok

## 2023-09-29 ENCOUNTER — TELEPHONE (OUTPATIENT)
Age: 43
End: 2023-09-29

## 2023-09-29 NOTE — TELEPHONE ENCOUNTER
LM for patient reminding them of their upcoming Endo procedure scheduled at [facility] this coming Monday 10/2 at 9:30 with arrival time of 8:00.   If unable to make it call [phone]

## 2023-10-02 ENCOUNTER — ANESTHESIA EVENT (OUTPATIENT)
Facility: HOSPITAL | Age: 43
End: 2023-10-02
Payer: COMMERCIAL

## 2023-10-02 ENCOUNTER — ANESTHESIA (OUTPATIENT)
Facility: HOSPITAL | Age: 43
End: 2023-10-02
Payer: COMMERCIAL

## 2023-10-02 ENCOUNTER — HOSPITAL ENCOUNTER (OUTPATIENT)
Facility: HOSPITAL | Age: 43
Setting detail: OUTPATIENT SURGERY
Discharge: HOME | End: 2023-10-02
Attending: SURGERY | Admitting: SURGERY
Payer: COMMERCIAL

## 2023-10-02 VITALS
HEART RATE: 54 BPM | BODY MASS INDEX: 25.62 KG/M2 | SYSTOLIC BLOOD PRESSURE: 105 MMHG | RESPIRATION RATE: 15 BRPM | HEIGHT: 66 IN | OXYGEN SATURATION: 100 % | DIASTOLIC BLOOD PRESSURE: 64 MMHG | TEMPERATURE: 97.8 F | WEIGHT: 159.39 LBS

## 2023-10-02 PROBLEM — K91.89 GASTROJEJUNAL ANASTOMOTIC STRICTURE: Status: ACTIVE | Noted: 2023-10-02

## 2023-10-02 PROCEDURE — 3700000000 HC ANESTHESIA ATTENDED CARE: Performed by: SURGERY

## 2023-10-02 PROCEDURE — 88305 TISSUE EXAM BY PATHOLOGIST: CPT

## 2023-10-02 PROCEDURE — 6360000002 HC RX W HCPCS: Performed by: NURSE ANESTHETIST, CERTIFIED REGISTERED

## 2023-10-02 PROCEDURE — 2580000003 HC RX 258: Performed by: SURGERY

## 2023-10-02 PROCEDURE — 3700000001 HC ADD 15 MINUTES (ANESTHESIA): Performed by: SURGERY

## 2023-10-02 PROCEDURE — 7100000010 HC PHASE II RECOVERY - FIRST 15 MIN: Performed by: SURGERY

## 2023-10-02 PROCEDURE — 2709999900 HC NON-CHARGEABLE SUPPLY: Performed by: SURGERY

## 2023-10-02 PROCEDURE — 3600007502: Performed by: SURGERY

## 2023-10-02 PROCEDURE — 7100000011 HC PHASE II RECOVERY - ADDTL 15 MIN: Performed by: SURGERY

## 2023-10-02 PROCEDURE — 3600007512: Performed by: SURGERY

## 2023-10-02 PROCEDURE — 2500000003 HC RX 250 WO HCPCS: Performed by: NURSE ANESTHETIST, CERTIFIED REGISTERED

## 2023-10-02 PROCEDURE — C1726 CATH, BAL DIL, NON-VASCULAR: HCPCS | Performed by: SURGERY

## 2023-10-02 RX ORDER — SODIUM CHLORIDE 9 MG/ML
25 INJECTION, SOLUTION INTRAVENOUS PRN
Status: DISCONTINUED | OUTPATIENT
Start: 2023-10-02 | End: 2023-10-02 | Stop reason: HOSPADM

## 2023-10-02 RX ORDER — FENTANYL CITRATE 50 UG/ML
INJECTION, SOLUTION INTRAMUSCULAR; INTRAVENOUS PRN
Status: DISCONTINUED | OUTPATIENT
Start: 2023-10-02 | End: 2023-10-02 | Stop reason: SDUPTHER

## 2023-10-02 RX ORDER — MIDAZOLAM HYDROCHLORIDE 1 MG/ML
INJECTION INTRAMUSCULAR; INTRAVENOUS PRN
Status: DISCONTINUED | OUTPATIENT
Start: 2023-10-02 | End: 2023-10-02 | Stop reason: SDUPTHER

## 2023-10-02 RX ORDER — DEXMEDETOMIDINE HYDROCHLORIDE 100 UG/ML
INJECTION, SOLUTION INTRAVENOUS PRN
Status: DISCONTINUED | OUTPATIENT
Start: 2023-10-02 | End: 2023-10-02 | Stop reason: SDUPTHER

## 2023-10-02 RX ORDER — SODIUM CHLORIDE 0.9 % (FLUSH) 0.9 %
5-40 SYRINGE (ML) INJECTION PRN
Status: DISCONTINUED | OUTPATIENT
Start: 2023-10-02 | End: 2023-10-02 | Stop reason: HOSPADM

## 2023-10-02 RX ORDER — PROPOFOL 10 MG/ML
INJECTION, EMULSION INTRAVENOUS PRN
Status: DISCONTINUED | OUTPATIENT
Start: 2023-10-02 | End: 2023-10-02 | Stop reason: SDUPTHER

## 2023-10-02 RX ORDER — LIDOCAINE HCL/PF 100 MG/5ML
SYRINGE (ML) INJECTION PRN
Status: DISCONTINUED | OUTPATIENT
Start: 2023-10-02 | End: 2023-10-02 | Stop reason: SDUPTHER

## 2023-10-02 RX ORDER — SODIUM CHLORIDE 0.9 % (FLUSH) 0.9 %
5-40 SYRINGE (ML) INJECTION EVERY 12 HOURS SCHEDULED
Status: DISCONTINUED | OUTPATIENT
Start: 2023-10-02 | End: 2023-10-02 | Stop reason: HOSPADM

## 2023-10-02 RX ORDER — PANTOPRAZOLE SODIUM 40 MG/1
TABLET, DELAYED RELEASE ORAL
COMMUNITY
Start: 2023-04-24

## 2023-10-02 RX ADMIN — MIDAZOLAM HYDROCHLORIDE 2 MG: 1 INJECTION, SOLUTION INTRAMUSCULAR; INTRAVENOUS at 10:05

## 2023-10-02 RX ADMIN — LIDOCAINE HYDROCHLORIDE 40 MG: 20 INJECTION, SOLUTION INTRAVENOUS at 10:04

## 2023-10-02 RX ADMIN — DEXMEDETOMIDINE 12 MCG: 100 INJECTION, SOLUTION INTRAVENOUS at 10:00

## 2023-10-02 RX ADMIN — FENTANYL CITRATE 50 MCG: 50 INJECTION, SOLUTION INTRAMUSCULAR; INTRAVENOUS at 10:00

## 2023-10-02 RX ADMIN — PROPOFOL 50 MG: 10 INJECTION, EMULSION INTRAVENOUS at 10:03

## 2023-10-02 RX ADMIN — SODIUM CHLORIDE: 9 INJECTION, SOLUTION INTRAVENOUS at 09:30

## 2023-10-02 RX ADMIN — PROPOFOL 140 MCG/KG/MIN: 10 INJECTION, EMULSION INTRAVENOUS at 10:02

## 2023-10-02 RX ADMIN — LIDOCAINE HYDROCHLORIDE 60 MG: 20 INJECTION, SOLUTION INTRAVENOUS at 10:02

## 2023-10-02 ASSESSMENT — PAIN - FUNCTIONAL ASSESSMENT: PAIN_FUNCTIONAL_ASSESSMENT: 0-10

## 2023-10-02 NOTE — PROGRESS NOTES
Alexa Cuevas  1980  [id number]    Situation:  Verbal report received from: Anyi Montero  Procedure: Procedure(s):  EGD ESOPHAGOGASTRODUODENOSCOPY  EGD BIOPSY  EGD ESOPHAGOGASTRODUODENOSCOPY DILATATION     Background:    Preoperative diagnosis: Marginal ulcers [K28.9]   Postoperative diagnosis: * No post-op diagnosis entered *     :  Dr. Serafin Sheppard  Assistant(s): Circulator: Ayaka Martinez RN  Endoscopy Technician: Cami Muller     Specimens:   ID Type Source Tests Collected by Time Destination   A : Joaquin Schofield MD 10/2/2023 1006       H. Pylori  No    Assessment:  Intra-procedure medications   Anesthesia gave intra-procedure sedation and medications, see anesthesia flow sheet Yes    Intravenous fluids: NS@ KVO     Vital signs stable yes    Abdominal assessment: round and soft yes    Recommendation:  Discharge patient per MD order yes.   Return to [address] [place] [address] or Friend friend  Permission to share finding with family or friend Yes

## 2023-10-02 NOTE — H&P
General Surgery History and Physical    CC: Epigastric pain    History of Present Illness:      Fernandez Rogers is a 37 y.o. female who presents with epigastric pain possible recurrent marginal ulcer. Past Medical History:   Diagnosis Date    Anemia     Anxiety     GERD (gastroesophageal reflux disease)     Hepatitis C     treated    Hx of abnormal Pap smear     Ill-defined condition     anemia hx, has received several blood transfusion, last blood transfusion May 5 2014    Intestinal perforation ([address])     Psychiatric disorder     OCD    PUD (peptic ulcer disease)      Past Surgical History:   Procedure Laterality Date    GASTRIC BYPASS SURGERY      GI  2009    perforated intestines    GYN       x 4    OTHER SURGICAL HISTORY  2022    FEEDING TUBE      Family History   Problem Relation Age of Onset    Diabetes Maternal Grandmother     Anesth Problems Neg Hx      Social History     Socioeconomic History    Marital status: Single   Tobacco Use    Smoking status: Former     Types: Cigarettes     Quit date: 2019     Years since quittin.6    Smokeless tobacco: Never   Substance and Sexual Activity    Alcohol use: No    Drug use: Not Currently     Types: Marijuana Natacha Larve)      Prior to Admission medications    Medication Sig Start Date End Date Taking?  Authorizing Provider   valACYclovir (VALTREX) 500 MG tablet Take 1 tablet by mouth daily 23   Luc Coles MD   medroxyPROGESTERone (DEPO-PROVERA) 150 MG/ML injection Inject 150 mg into the muscle every 3 months 23   Luc Coles MD   metroNIDAZOLE (METROGEL) 0.75 % vaginal gel Place 5 g vaginally every 7 days  Patient not taking: Reported on 2023   Ar Automatic Reconciliation   naproxen (NAPROSYN) 500 MG tablet Take 500 mg by mouth 2 times daily (with meals)  Patient not taking: Reported on 2023   Ar Automatic Reconciliation   ondansetron (ZOFRAN-ODT) 4 MG disintegrating tablet Take 4 mg by

## 2023-10-02 NOTE — PERIOP NOTE
Received recovery report from anesthesia team, see anesthesia note. Abdomen remains soft and non-tender post-procedure. Pt has no complaints at this time and tolerated procedure well. Endoscope was pre-cleaned at the bedside by [facility] immediately following procedure. Post recovery report given to [address] [place] [address].

## 2023-10-02 NOTE — ANESTHESIA POSTPROCEDURE EVALUATION
Department of Anesthesiology  Postprocedure Note    Patient: Lucia Hammond  MRN: [id number]  YOB: 1980  Date of evaluation: 10/2/2023      Procedure Summary     Date: 10/02/23 Room / Location: [facility] 03 / [facility] ENDOSCOPY    Anesthesia Start: 0957 Anesthesia Stop: 1020    Procedures:       EGD ESOPHAGOGASTRODUODENOSCOPY (Upper GI Region)      EGD BIOPSY (Upper GI Region)      EGD ESOPHAGOGASTRODUODENOSCOPY DILATATION (Upper GI Region) Diagnosis:       Marginal ulcers      (Marginal ulcers [K28.9])    Surgeons: Sukhdev Morales MD Responsible Provider: Liz Mckeon MD    Anesthesia Type: TIVA, MAC ASA Status: 3          Anesthesia Type: No value filed.     Sebastien Phase I: Sebastien Score: 9    Sebastien Phase II: Sebastien Score: 10      Anesthesia Post Evaluation

## 2023-10-02 NOTE — OP NOTE
[facility]   Endoscopic Procedure Note        NAME:  Louise Brody   :   1980   MRN:   [id number]     Date/Time:  10/2/2023 10:16 AM    Esophagogastroduodenoscopy (EGD) Procedure Note    Preoperative Diagnosis: Marginal ulcers [K28.9]  Postoperative Diagnosis: Post-Op Diagnosis Codes:     * Marginal ulcers [K28.9]       Surgeon:  Cintia Nguyen MD    Staff: Circulator: Ollie Curran RN  Endoscopy Technician: Miri Perez     Implants: None    Anethesia/Sedation:  MAC anesthesia Propofol    Procedure Details     After infom consent was obtained for the procedure, with all risks and benefits of procedure explained the patient was taken to the endoscopy suite and placed in the left lateral decubitus position. Following sequential administration of sedation as per above, the GIF-H190 gastroscope was inserted into the mouth and advanced under direct vision to proximal jejunum. A careful inspection was made as the gastroscope was withdrawn, including a retroflexed view of the proximal stomach; findings and interventions are described below. Findings:  Esophagus: mild esophagitis and irregular GJ, GEJ at 37 cm, small hiatal hernia  Stomach: 5 mc pouch, GJ stricture at 12 mm, no marginal ulcer  Duodenum/jejunum: Normal       Therapies: CRE dilation to 15 mm then 16.5 mm. Specimens: Cold bx GEJ           EBL: Minimal    Complications:   None; patient tolerated the procedure well. Impression:    See Postoperative diagnosis above    Recommendations:  Continue PPI  Follow up with Dr. Michael Colunga in two weeks.     Discharge disposition:  Home in the company of  when able to ambulate    Cintia Nguyen MD, [facility]  Bariatric and General Surgeon  [facility]

## 2023-10-02 NOTE — PROGRESS NOTES
Endoscopy discharge instructions have been reviewed and given to patient. The patient verbalized understanding and acceptance of instructions. Dr. Monique Calderon discussed with Friend procedure findings and next steps. Dentures returned to patient.

## 2023-10-02 NOTE — PERIOP NOTE
CRE balloon dilatation of the esophagus   15 mm Balloon inflated to 3 ATMs and held for 15 seconds. 16.5 mm Balloon inflated to 4.5 ATMs and held for 30 seconds. No subcutaneous crepitus of the chest or cervical region was noted post dilatation.

## 2023-10-09 ENCOUNTER — TELEPHONE (OUTPATIENT)
Age: 43
End: 2023-10-09

## 2023-10-09 NOTE — TELEPHONE ENCOUNTER
Called patient to reschedule their no show post op from today. Patient did not answer. Left voicemail to return our call.

## 2023-10-13 ENCOUNTER — TELEPHONE (OUTPATIENT)
Age: 43
End: 2023-10-13

## 2023-10-13 ENCOUNTER — HOSPITAL ENCOUNTER (EMERGENCY)
Facility: HOSPITAL | Age: 43
Discharge: HOME | End: 2023-10-13
Attending: STUDENT IN AN ORGANIZED HEALTH CARE EDUCATION/TRAINING PROGRAM
Payer: COMMERCIAL

## 2023-10-13 VITALS
WEIGHT: 153 LBS | RESPIRATION RATE: 11 BRPM | DIASTOLIC BLOOD PRESSURE: 75 MMHG | SYSTOLIC BLOOD PRESSURE: 135 MMHG | HEIGHT: 65 IN | TEMPERATURE: 97.7 F | BODY MASS INDEX: 25.49 KG/M2 | OXYGEN SATURATION: 98 % | HEART RATE: 73 BPM

## 2023-10-13 DIAGNOSIS — R10.13 EPIGASTRIC PAIN: Primary | ICD-10-CM

## 2023-10-13 LAB
ALBUMIN SERPL-MCNC: 4 G/DL (ref 3.5–5)
ALBUMIN/GLOB SERPL: 1.1 (ref 1.1–2.2)
ALP SERPL-CCNC: 66 U/L (ref 45–117)
ALT SERPL-CCNC: 34 U/L (ref 12–78)
ANION GAP SERPL CALC-SCNC: 7 MMOL/L (ref 5–15)
AST SERPL W P-5'-P-CCNC: 25 U/L (ref 15–37)
BASOPHILS # BLD: 0 K/UL (ref 0–0.1)
BASOPHILS NFR BLD: 0 % (ref 0–1)
BILIRUB SERPL-MCNC: 0.9 MG/DL (ref 0.2–1)
BUN SERPL-MCNC: 18 MG/DL (ref 6–20)
BUN/CREAT SERPL: 20 (ref 12–20)
CA-I BLD-MCNC: 9.8 MG/DL (ref 8.5–10.1)
CHLORIDE SERPL-SCNC: 107 MMOL/L (ref 97–108)
CO2 SERPL-SCNC: 23 MMOL/L (ref 21–32)
CREAT SERPL-MCNC: 0.92 MG/DL (ref 0.55–1.02)
DIFFERENTIAL METHOD BLD: ABNORMAL
EOSINOPHIL # BLD: 0.1 K/UL (ref 0–0.4)
EOSINOPHIL NFR BLD: 2 % (ref 0–7)
ERYTHROCYTE [DISTWIDTH] IN BLOOD BY AUTOMATED COUNT: 14.6 % (ref 11.5–14.5)
GLOBULIN SER CALC-MCNC: 3.6 G/DL (ref 2–4)
GLUCOSE SERPL-MCNC: 86 MG/DL (ref 65–100)
HCT VFR BLD AUTO: 39.2 % (ref 35–47)
HGB BLD-MCNC: 12.8 G/DL (ref 11.5–16)
IMM GRANULOCYTES # BLD AUTO: 0 K/UL (ref 0–0.04)
IMM GRANULOCYTES NFR BLD AUTO: 0 % (ref 0–0.5)
LIPASE SERPL-CCNC: 40 U/L (ref 13–75)
LYMPHOCYTES # BLD: 1.6 K/UL (ref 0.8–3.5)
LYMPHOCYTES NFR BLD: 34 % (ref 12–49)
MCH RBC QN AUTO: 26.9 PG (ref 26–34)
MCHC RBC AUTO-ENTMCNC: 32.7 G/DL (ref 30–36.5)
MCV RBC AUTO: 82.4 FL (ref 80–99)
MONOCYTES # BLD: 0.6 K/UL (ref 0–1)
MONOCYTES NFR BLD: 12 % (ref 5–13)
NEUTS SEG # BLD: 2.4 K/UL (ref 1.8–8)
NEUTS SEG NFR BLD: 52 % (ref 32–75)
NRBC # BLD: 0 K/UL (ref 0–0.01)
NRBC BLD-RTO: 0 PER 100 WBC
PLATELET # BLD AUTO: 140 K/UL (ref 150–400)
POTASSIUM SERPL-SCNC: 4.2 MMOL/L (ref 3.5–5.1)
PROT SERPL-MCNC: 7.6 G/DL (ref 6.4–8.2)
RBC # BLD AUTO: 4.76 M/UL (ref 3.8–5.2)
SODIUM SERPL-SCNC: 137 MMOL/L (ref 136–145)
WBC # BLD AUTO: 4.7 K/UL (ref 3.6–11)

## 2023-10-13 PROCEDURE — 96374 THER/PROPH/DIAG INJ IV PUSH: CPT

## 2023-10-13 PROCEDURE — 6370000000 HC RX 637 (ALT 250 FOR IP): Performed by: STUDENT IN AN ORGANIZED HEALTH CARE EDUCATION/TRAINING PROGRAM

## 2023-10-13 PROCEDURE — 93005 ELECTROCARDIOGRAM TRACING: CPT

## 2023-10-13 PROCEDURE — 99284 EMERGENCY DEPT VISIT MOD MDM: CPT

## 2023-10-13 PROCEDURE — 85025 COMPLETE CBC W/AUTO DIFF WBC: CPT

## 2023-10-13 PROCEDURE — C9113 INJ PANTOPRAZOLE SODIUM, VIA: HCPCS | Performed by: STUDENT IN AN ORGANIZED HEALTH CARE EDUCATION/TRAINING PROGRAM

## 2023-10-13 PROCEDURE — 80053 COMPREHEN METABOLIC PANEL: CPT

## 2023-10-13 PROCEDURE — 36415 COLL VENOUS BLD VENIPUNCTURE: CPT

## 2023-10-13 PROCEDURE — 2580000003 HC RX 258: Performed by: STUDENT IN AN ORGANIZED HEALTH CARE EDUCATION/TRAINING PROGRAM

## 2023-10-13 PROCEDURE — 6360000002 HC RX W HCPCS: Performed by: STUDENT IN AN ORGANIZED HEALTH CARE EDUCATION/TRAINING PROGRAM

## 2023-10-13 PROCEDURE — 83690 ASSAY OF LIPASE: CPT

## 2023-10-13 RX ORDER — ONDANSETRON 4 MG/1
4 TABLET, ORALLY DISINTEGRATING ORAL 3 TIMES DAILY PRN
Qty: 30 TABLET | Refills: 0 | Status: SHIPPED | OUTPATIENT
Start: 2023-10-13

## 2023-10-13 RX ORDER — SUCRALFATE 1 G/1
1 TABLET ORAL 4 TIMES DAILY
Qty: 40 TABLET | Refills: 0 | Status: SHIPPED | OUTPATIENT
Start: 2023-10-13

## 2023-10-13 RX ADMIN — SODIUM CHLORIDE, PRESERVATIVE FREE 40 MG: 5 INJECTION INTRAVENOUS at 10:17

## 2023-10-13 RX ADMIN — Medication 30 ML: at 10:56

## 2023-10-13 ASSESSMENT — PAIN - FUNCTIONAL ASSESSMENT: PAIN_FUNCTIONAL_ASSESSMENT: 0-10

## 2023-10-13 ASSESSMENT — PAIN SCALES - GENERAL: PAINLEVEL_OUTOF10: 8

## 2023-10-13 ASSESSMENT — LIFESTYLE VARIABLES
HOW MANY STANDARD DRINKS CONTAINING ALCOHOL DO YOU HAVE ON A TYPICAL DAY: PATIENT DOES NOT DRINK
HOW OFTEN DO YOU HAVE A DRINK CONTAINING ALCOHOL: NEVER

## 2023-10-13 NOTE — TELEPHONE ENCOUNTER
Patient requested a phone call today. I called the patient. She was recently, today, in the ER for acute on chronic epigastric pain. Underwent endoscopy 2 weeks ago showing some gastritis and mild stricture of the GJ but no recurrent marginal ulcer. Patient reports the inability to tolerate adequate oral nutrition. Taking her PPI and Carafate intermittently. Limited resources at home. She is dissatisfied with her overall bypass history and health. She would like to have a reversal.  Denied reflux before the procedure. Understands the risk of poor gastric emptying and reflux. She is requesting a reversal.  We will contact her to arrange for this. Albumin today on labs was 4. I want her to drink 3 protein shakes a day until surgery. We discussed the risks of bleeding, infection, leak, gastroparesis, GERD, and the risk of anesthesia. Plan for patient into the hospital afterward. Patient understands all the above and elects to proceed.     Tom Pierre MD, FACS, [facility]  Bariatric and General Surgeon  [facility]

## 2023-10-13 NOTE — TELEPHONE ENCOUNTER
Returned call to patient. Two patient identifiers used. Patient stated she has a Endoscopy with Dilation with  [facility], she stated she is still in pain, stated she pain has worsen since endoscopy, patient stated she is currently in the ER at [facility], she stated she was given Protonix through IV, and a GI cocktail, they are now about to draw labs, and may give IV fluids due to dehydration. She stated she is having pain in upper part of abdomen, Patient stated she would like for  [facility] to call her to discuss what can be done about this. Made patient aware I will make the provider aware, patient verbalized understanding and thanked for the call.

## 2023-10-13 NOTE — ED PROVIDER NOTES
[facility] EMERGENCY DEPT  EMERGENCY DEPARTMENT HISTORY AND PHYSICAL EXAM      Date: 10/13/2023  Patient Name: Delores Shannon  MRN: [id number]  [address] [facility] 1980  Date of evaluation: 10/13/2023  Provider: Heaven Haney MD   Note Started: 10:08 AM EDT 10/13/23    HISTORY OF PRESENT ILLNESS     Chief Complaint   Patient presents with    Abdominal Pain       History Provided By: Patient    HPI: Delores Shannon is a 37 y.o. female with PMH of GERD, peptic ulcer disease, and gastric bypass surgery comes to the ED with abdominal pain. She had pain in the epigastrium, nonradiating, worsened by eating and sometimes get better when she is lying flat. There is no associated nausea, vomiting, change in bowel, dater, urinary habits. He denies pain anywhere else. She reports that she had an endoscopy approximately a month ago with a procedure. Since then she has been experiencing constant abdominal pain.     PAST MEDICAL HISTORY   Past Medical History:  Past Medical History:   Diagnosis Date    Anemia     Anxiety     GERD (gastroesophageal reflux disease)     Hepatitis C     treated    Hx of abnormal Pap smear     Ill-defined condition     anemia hx, has received several blood transfusion, last blood transfusion May 5 2014    Intestinal perforation ([address])     Psychiatric disorder     OCD    PUD (peptic ulcer disease)        Past Surgical History:  Past Surgical History:   Procedure Laterality Date    GASTRIC BYPASS SURGERY      GI  2009    perforated intestines    GYN       x 4    OTHER SURGICAL HISTORY  2022    FEEDING TUBE    UPPER GASTROINTESTINAL ENDOSCOPY N/A 10/2/2023    EGD ESOPHAGOGASTRODUODENOSCOPY performed by Ky De Leon MD at [address] [place] [address] N/A 10/2/2023    EGD BIOPSY performed by Ky De Leon MD at [address] [place] [address]  10/2/2023    EGD ESOPHAGOGASTRODUODENOSCOPY DILATATION performed by Ky De Leon MD at [facility]

## 2023-10-13 NOTE — TELEPHONE ENCOUNTER
Patient called and stated she missed her last appointment due to hospitalization. She stated she is currently hospitalized at [facility] for pain that continued to worsen post op.  She states she just wants to talk with Dr. Serafin Sheppard about what is going on and plan surgery reversal.

## 2023-10-13 NOTE — ED TRIAGE NOTES
Patient with history of ulcers and gastric bypass, had esophageal dilation beginning of october.  Since then she has had severe epigastric pain

## 2023-10-13 NOTE — TELEPHONE ENCOUNTER
Made Dr. Cindy Franklin aware of message. Per Dr. Cindy Franklin he will speak with Mari Romero concerning patient.

## 2023-10-16 ENCOUNTER — CLINICAL DOCUMENTATION (OUTPATIENT)
Age: 43
End: 2023-10-16

## 2023-10-16 NOTE — PROGRESS NOTES
Submitted clinicals to [facility] via [employer] for robotic reversal of gastric bypass pre auth for Dr Skye Gipson.       Pending [facility] [id number]

## 2023-10-18 ENCOUNTER — TELEPHONE (OUTPATIENT)
Age: 43
End: 2023-10-18

## 2023-10-18 NOTE — TELEPHONE ENCOUNTER
Spoke to patient,  I offered 12/14/23 for surgery and she accepted. I let her know that I will be scheduling her per op appointments. That is any of these appointments are a no show or rescheduled it could push her surgery out. She understood. I also let her know that I will schedule her post op appointment and that once everything is scheduled I will put in all in a letter with dates, times and if they are virtual or in person. This letter will post to [facility] and I will mail it out. I will also call to let you know when it has been posted. I told her should should hear from me by end of day today.   She acknowledged ok and thank you

## 2023-10-18 NOTE — TELEPHONE ENCOUNTER
Spoke to patient let her know her surgery letter has posted to her mychart and will go out in the mail tomorrow. She had some questions about the surgery, I told her I didn't have the answer to reach out to Dr Marj Brenner by calling the main number or reaching out through my chart.   She acknowledged ok and thank you
Parent(s)

## 2023-10-27 RX ORDER — PANTOPRAZOLE SODIUM 40 MG/1
TABLET, DELAYED RELEASE ORAL
Qty: 30 TABLET | Refills: 1 | Status: SHIPPED | OUTPATIENT
Start: 2023-10-27

## 2023-10-30 ENCOUNTER — PATIENT MESSAGE (OUTPATIENT)
Age: 43
End: 2023-10-30

## 2023-10-30 ENCOUNTER — TELEPHONE (OUTPATIENT)
Age: 43
End: 2023-10-30

## 2023-10-30 RX ORDER — OMEPRAZOLE 40 MG/1
40 CAPSULE, DELAYED RELEASE ORAL
Qty: 90 CAPSULE | Refills: 3 | Status: SHIPPED | OUTPATIENT
Start: 2023-10-30

## 2023-10-30 RX ORDER — POLYETHYLENE GLYCOL 3350 17 G/17G
17 POWDER, FOR SOLUTION ORAL DAILY PRN
Qty: 14 EACH | Refills: 0 | Status: SHIPPED | OUTPATIENT
Start: 2023-10-30

## 2023-10-30 NOTE — TELEPHONE ENCOUNTER
Patient stated she missed work Friday due to all the trouble she was having with bowel movement and needs a note for missing work Friday.

## 2023-10-30 NOTE — TELEPHONE ENCOUNTER
From: Lucia Hammond  To: Dr. Livia Delgado: 10/30/2023 6:44 AM EDT  Subject: [facility] hope your great. Well me nope. Constipation took me out of work on Friday. No bm since the hospital visit. Except once. Please send note for that. Then last night, not sure but blacked out thinking either my sugar dropped or something like that. Sweating, blurred vision before I knew it out on the pavement. Lastly, I was told I needed a script for Protonic and I have been taking two at a time, with a script of 30 quantity. I believe that's it.  I can tell you, I have been working on a letter to give to you on this, Gastric Bypass Experience

## 2023-10-30 NOTE — TELEPHONE ENCOUNTER
I called and spoke with the patient. She informed me of the incident last evening where she lost consciousness. She picked up the prescriptions. I informed of his recommendations on putting in a PICC line and starting TPN. She had questions related to the infusion time of the TPN because she expressed concerns about being able to go back to work. I told her to come in tomorrow at 2:45 to be seen related to the syncope episode. She acknowledged understanding and thanked me for the call.

## 2023-10-31 ENCOUNTER — OFFICE VISIT (OUTPATIENT)
Age: 43
End: 2023-10-31
Payer: COMMERCIAL

## 2023-10-31 VITALS
WEIGHT: 162 LBS | BODY MASS INDEX: 26.99 KG/M2 | OXYGEN SATURATION: 97 % | HEIGHT: 65 IN | SYSTOLIC BLOOD PRESSURE: 110 MMHG | HEART RATE: 75 BPM | TEMPERATURE: 98.7 F | RESPIRATION RATE: 20 BRPM | DIASTOLIC BLOOD PRESSURE: 73 MMHG

## 2023-10-31 DIAGNOSIS — Z98.84 PERSONAL HX OF GASTRIC BYPASS: Primary | ICD-10-CM

## 2023-10-31 PROCEDURE — 99213 OFFICE O/P EST LOW 20 MIN: CPT | Performed by: SURGERY

## 2023-10-31 ASSESSMENT — PATIENT HEALTH QUESTIONNAIRE - PHQ9
SUM OF ALL RESPONSES TO PHQ QUESTIONS 1-9: 0
SUM OF ALL RESPONSES TO PHQ QUESTIONS 1-9: 0
2. FEELING DOWN, DEPRESSED OR HOPELESS: 0
SUM OF ALL RESPONSES TO PHQ QUESTIONS 1-9: 0
SUM OF ALL RESPONSES TO PHQ QUESTIONS 1-9: 0
SUM OF ALL RESPONSES TO PHQ9 QUESTIONS 1 & 2: 0
1. LITTLE INTEREST OR PLEASURE IN DOING THINGS: 0

## 2023-11-06 RX ORDER — PANTOPRAZOLE SODIUM 40 MG/1
40 TABLET, DELAYED RELEASE ORAL
Qty: 60 TABLET | Refills: 2 | Status: SHIPPED | OUTPATIENT
Start: 2023-11-06

## 2023-11-10 ENCOUNTER — TELEPHONE (OUTPATIENT)
Age: 43
End: 2023-11-10

## 2023-11-10 NOTE — TELEPHONE ENCOUNTER
LM letting patient know she has a new surgery time on 12/14/23 it is now at 10:00AM with arrival time of 8:00AM

## 2023-11-20 ENCOUNTER — HOSPITAL ENCOUNTER (OUTPATIENT)
Age: 43
Discharge: HOME | End: 2023-11-23
Payer: COMMERCIAL

## 2023-11-20 ENCOUNTER — HOSPITAL ENCOUNTER (OUTPATIENT)
Facility: HOSPITAL | Age: 43
Discharge: HOME | End: 2023-11-23
Payer: COMMERCIAL

## 2023-11-20 VITALS
RESPIRATION RATE: 18 BRPM | TEMPERATURE: 97.9 F | HEIGHT: 66 IN | BODY MASS INDEX: 26.5 KG/M2 | HEART RATE: 98 BPM | SYSTOLIC BLOOD PRESSURE: 114 MMHG | DIASTOLIC BLOOD PRESSURE: 75 MMHG | WEIGHT: 164.9 LBS

## 2023-11-20 LAB
ALBUMIN SERPL-MCNC: 4.3 G/DL (ref 3.5–5)
ALBUMIN/GLOB SERPL: 1.2 (ref 1.1–2.2)
ALP SERPL-CCNC: 70 U/L (ref 45–117)
ALT SERPL-CCNC: 34 U/L (ref 12–78)
ANION GAP SERPL CALC-SCNC: 4 MMOL/L (ref 5–15)
AST SERPL-CCNC: 24 U/L (ref 15–37)
BASOPHILS # BLD: 0 K/UL (ref 0–0.1)
BASOPHILS NFR BLD: 0 % (ref 0–1)
BILIRUB SERPL-MCNC: 1.1 MG/DL (ref 0.2–1)
BUN SERPL-MCNC: 15 MG/DL (ref 6–20)
BUN/CREAT SERPL: 21 (ref 12–20)
CALCIUM SERPL-MCNC: 9.6 MG/DL (ref 8.5–10.1)
CHLORIDE SERPL-SCNC: 104 MMOL/L (ref 97–108)
CO2 SERPL-SCNC: 27 MMOL/L (ref 21–32)
CREAT SERPL-MCNC: 0.72 MG/DL (ref 0.55–1.02)
DIFFERENTIAL METHOD BLD: ABNORMAL
EKG ATRIAL RATE: 59 BPM
EKG DIAGNOSIS: NORMAL
EKG P AXIS: 39 DEGREES
EKG P-R INTERVAL: 184 MS
EKG Q-T INTERVAL: 398 MS
EKG QRS DURATION: 80 MS
EKG QTC CALCULATION (BAZETT): 394 MS
EKG R AXIS: 50 DEGREES
EKG T AXIS: 39 DEGREES
EKG VENTRICULAR RATE: 59 BPM
EOSINOPHIL # BLD: 0.1 K/UL (ref 0–0.4)
EOSINOPHIL NFR BLD: 2 % (ref 0–7)
ERYTHROCYTE [DISTWIDTH] IN BLOOD BY AUTOMATED COUNT: 16 % (ref 11.5–14.5)
EST. AVERAGE GLUCOSE BLD GHB EST-MCNC: 100 MG/DL
FOLATE SERPL-MCNC: 17.3 NG/ML (ref 5–21)
GLOBULIN SER CALC-MCNC: 3.5 G/DL (ref 2–4)
GLUCOSE SERPL-MCNC: 87 MG/DL (ref 65–100)
HBA1C MFR BLD: 5.1 % (ref 4–5.6)
HCG UR QL: NEGATIVE
HCT VFR BLD AUTO: 38.3 % (ref 35–47)
HGB BLD-MCNC: 12.3 G/DL (ref 11.5–16)
IMM GRANULOCYTES # BLD AUTO: 0 K/UL (ref 0–0.04)
IMM GRANULOCYTES NFR BLD AUTO: 0 % (ref 0–0.5)
IRON SATN MFR SERPL: 25 % (ref 20–50)
IRON SERPL-MCNC: 110 UG/DL (ref 35–150)
LYMPHOCYTES # BLD: 1.4 K/UL (ref 0.8–3.5)
LYMPHOCYTES NFR BLD: 35 % (ref 12–49)
MCH RBC QN AUTO: 26.5 PG (ref 26–34)
MCHC RBC AUTO-ENTMCNC: 32.1 G/DL (ref 30–36.5)
MCV RBC AUTO: 82.5 FL (ref 80–99)
MONOCYTES # BLD: 0.4 K/UL (ref 0–1)
MONOCYTES NFR BLD: 10 % (ref 5–13)
NEUTS SEG # BLD: 2.2 K/UL (ref 1.8–8)
NEUTS SEG NFR BLD: 53 % (ref 32–75)
NRBC # BLD: 0 K/UL (ref 0–0.01)
NRBC BLD-RTO: 0 PER 100 WBC
PLATELET # BLD AUTO: 159 K/UL (ref 150–400)
POTASSIUM SERPL-SCNC: 4 MMOL/L (ref 3.5–5.1)
PROT SERPL-MCNC: 7.8 G/DL (ref 6.4–8.2)
RBC # BLD AUTO: 4.64 M/UL (ref 3.8–5.2)
RBC MORPH BLD: ABNORMAL
SODIUM SERPL-SCNC: 135 MMOL/L (ref 136–145)
TIBC SERPL-MCNC: 445 UG/DL (ref 250–450)
TSH SERPL DL<=0.05 MIU/L-ACNC: 3.07 UIU/ML (ref 0.36–3.74)
VIT B12 SERPL-MCNC: 1209 PG/ML (ref 193–986)
WBC # BLD AUTO: 4.1 K/UL (ref 3.6–11)

## 2023-11-20 PROCEDURE — 83540 ASSAY OF IRON: CPT

## 2023-11-20 PROCEDURE — 85025 COMPLETE CBC W/AUTO DIFF WBC: CPT

## 2023-11-20 PROCEDURE — 83550 IRON BINDING TEST: CPT

## 2023-11-20 PROCEDURE — 82652 VIT D 1 25-DIHYDROXY: CPT

## 2023-11-20 PROCEDURE — 80053 COMPREHEN METABOLIC PANEL: CPT

## 2023-11-20 PROCEDURE — 71046 X-RAY EXAM CHEST 2 VIEWS: CPT

## 2023-11-20 PROCEDURE — 84425 ASSAY OF VITAMIN B-1: CPT

## 2023-11-20 PROCEDURE — 93010 ELECTROCARDIOGRAM REPORT: CPT | Performed by: INTERNAL MEDICINE

## 2023-11-20 PROCEDURE — 84597 ASSAY OF VITAMIN K: CPT

## 2023-11-20 PROCEDURE — 83036 HEMOGLOBIN GLYCOSYLATED A1C: CPT

## 2023-11-20 PROCEDURE — 81025 URINE PREGNANCY TEST: CPT

## 2023-11-20 PROCEDURE — 36415 COLL VENOUS BLD VENIPUNCTURE: CPT

## 2023-11-20 PROCEDURE — 84443 ASSAY THYROID STIM HORMONE: CPT

## 2023-11-20 PROCEDURE — 82746 ASSAY OF FOLIC ACID SERUM: CPT

## 2023-11-20 PROCEDURE — 82607 VITAMIN B-12: CPT

## 2023-11-20 PROCEDURE — 84590 ASSAY OF VITAMIN A: CPT

## 2023-11-20 PROCEDURE — 93005 ELECTROCARDIOGRAM TRACING: CPT | Performed by: SURGERY

## 2023-11-20 RX ORDER — METRONIDAZOLE 7.5 MG/G
GEL VAGINAL DAILY
COMMUNITY
Start: 2023-11-18

## 2023-11-20 NOTE — PERIOP NOTE
[address] INSTRUCTIONS    Surgery Date:   12/14/2023    Your surgeon's office or [facility] staff will call you between 4 PM- 8 PM the day before surgery with your arrival time. If your surgery is on a Monday, you will receive a call the preceding Friday. If your surgeon;s office has given you arrival time, then go by that time. Please report to [facility] Patient Access/Admitting on the 1st floor. Bring your insurance card, photo identification, and any copayment ( if applicable). If you are going home the same day of your surgery, you must have a responsible adult to drive you home. You need to have a responsible adult to stay with you the first 24 hours after surgery and you should not drive a car for 24 hours following your surgery. If you are being admitted to the hospital, please leave personal belongings/luggage in your car until you have an assigned hospital room number. Nothing to eat or drink after midnight the night before surgery. This includes no water, gum, mints, coffee, juice, etc.  Please note special instructions, if applicable, below for medications. Do NOT drink alcohol or smoke 24 hours before surgery. STOP smoking for 14 days prior as it helps with breathing and healing after surgery. Please wear comfortable clothes. Wear glasses instead of contacts. We ask that all money and jewelry and valuables to be left at home. Wear no makeup, particularly mascara the day of surgery. All body piercings, rings, and jewelry need to be removed and left at home. Remove all nail polish except for clear. Please wear your hair loose or down. Please no pony-tails, buns, or any metal hair accessories. You may wear deodorant, unless having breast surgery. Do not shave any body area within 24 hours of your surgery. Please follow all instructions to avoid any potential surgical cancellation.   Should your physical condition change, (i.e. fever, cold, flu, etc.) please notify your

## 2023-11-20 NOTE — PERIOP NOTE
[address] INSTRUCTIONS    Surgery Date:   12/14/2023    Your surgeon's office or [facility] staff will call you between 4 PM- 8 PM the day before surgery with your arrival time. If your surgery is on a Monday, you will receive a call the preceding Friday. If your surgeon's office has given you, your arrival time then go by that time. Please report to [facility] Patient Access/Admitting on the 1st floor. Bring your insurance card, photo identification, and any copayment ( if applicable). If you are going home the same day of your surgery, you must have a responsible adult to drive you home. You need to have a responsible adult to stay with you the first 24 hours after surgery and you should not drive a car for 24 hours following your surgery. If you are being admitted to the hospital, please leave personal belongings/luggage in your car until you have an assigned hospital room number. Do NOT drink alcohol or smoke 24 hours before surgery. STOP smoking for 14 days prior as it helps with breathing and healing after surgery. Please wear comfortable clothes. Wear your glasses instead of contacts. We ask that all money, jewelry and valuables be left at home. Wear no make up, particularly mascara, the day of surgery. All body piercings, rings, and jewelry need to be removed and left at home. Remove all nail polish except for clear. Please wear your hair loose or down, no pony-tails, buns, or any metal hair accessories. You may wear deodorant, unless having breast surgery. Do not shave any body area within 24 hours of your surgery. Please follow all instructions to avoid any potential surgical cancellation. Should your physical condition change, (i.e. fever, cold, flu, etc.) please notify your surgeon as soon as possible. It is important to be on time. If a situation occurs where you may be delayed, please call:  [phone] / [phone] on the day of surgery.   The Pread[facility]

## 2023-11-20 NOTE — PERIOP NOTE
INSTRUCTIONS GIVEN AND REVIEWED, 2 BOTTLES OF SOAP GIVEN, PT GIVEN TIME TO ASK QUESTIONS     EKG PERFORMED    PATIENT GIVEN WRITTEN INSTRUCTIONS TO GO TO THE IMAGING CENTER/CANCER CENTER [address] [facility] [address] TO HAVE CHEST XRAY COMPLETED. 0 = independent

## 2023-11-21 LAB
1,25(OH)2D3 SERPL-MCNC: 43.8 PG/ML (ref 24.8–81.5)
BACTERIA SPEC CULT: NORMAL
BACTERIA SPEC CULT: NORMAL
SERVICE CMNT-IMP: NORMAL

## 2023-11-22 LAB — VIT A SERPL-MCNC: 31.3 UG/DL (ref 20.1–62)

## 2023-11-23 LAB — VIT B1 BLD-SCNC: 77.2 NMOL/L (ref 66.5–200)

## 2023-11-24 LAB — PHYTONADIONE SERPL-MCNC: 0.1 NG/ML (ref 0.1–2.2)

## 2023-11-28 ENCOUNTER — TELEMEDICINE (OUTPATIENT)
Age: 43
End: 2023-11-28

## 2023-11-28 ENCOUNTER — TELEPHONE (OUTPATIENT)
Age: 43
End: 2023-11-28

## 2023-11-28 VITALS — WEIGHT: 164 LBS | BODY MASS INDEX: 26.36 KG/M2 | HEIGHT: 66 IN

## 2023-11-28 DIAGNOSIS — R63.39 FOOD AVERSION: ICD-10-CM

## 2023-11-28 DIAGNOSIS — R63.0 ANOREXIA: ICD-10-CM

## 2023-11-28 DIAGNOSIS — K91.2 POSTOPERATIVE INTESTINAL MALABSORPTION: Primary | ICD-10-CM

## 2023-11-28 DIAGNOSIS — K28.9 GJU (GASTROJEJUNAL ULCER): ICD-10-CM

## 2023-11-28 ASSESSMENT — PATIENT HEALTH QUESTIONNAIRE - PHQ9
SUM OF ALL RESPONSES TO PHQ QUESTIONS 1-9: 0
2. FEELING DOWN, DEPRESSED OR HOPELESS: 0
SUM OF ALL RESPONSES TO PHQ QUESTIONS 1-9: 0
1. LITTLE INTEREST OR PLEASURE IN DOING THINGS: 0
SUM OF ALL RESPONSES TO PHQ QUESTIONS 1-9: 0
SUM OF ALL RESPONSES TO PHQ QUESTIONS 1-9: 0
SUM OF ALL RESPONSES TO PHQ9 QUESTIONS 1 & 2: 0

## 2023-11-28 NOTE — PROGRESS NOTES
Call #1 called pt and left her a voice mail to call me back so I can check her in for her appointment. Identified patient with two patient identifiers (name and ). Reviewed chart in preparation for visit and have obtained necessary documentation. Cady Anderson is a 37 y.o. female  Chief Complaint   Patient presents with    H&P     Scheduled for robotic reversal of Gastric bypass by Dr Diandra Funes     There were no vitals taken for this visit. 1. Have you been to the ER, urgent care clinic since your last visit? Hospitalized since your last visit?no    2. Have you seen or consulted any other health care providers outside of the [address] [place] [address] since your last visit? Include any pap smears or colon screening.  no

## 2023-11-28 NOTE — PATIENT INSTRUCTIONS
Make an appointment with Laly Reed RD, one of the bariatric dietitians, please call the bariatric line at [phone]. Appointments are offered in person and virtual at no charge. Therapist Ba Mayery. D  [phone]    Aim for 2 protein shakes daily before surgery to help with your healing     Day Before Surgery:   -  take (2) Extra Strength Tylenol (acetaminophen) at noon and 8 pm    -  you may drink sugar free clear liquids until 3 hours prior to surgery      your after surgery prescriptions BEFORE surgery     Make your 2, 4 and 6 week appointments for after surgery

## 2023-11-28 NOTE — TELEPHONE ENCOUNTER
Attempt has been made to contact patient  to reschedule appointment with   Thais Burgess NP     Patient can be scheduled on 12/26

## 2023-11-30 ENCOUNTER — TELEPHONE (OUTPATIENT)
Age: 43
End: 2023-11-30

## 2023-11-30 NOTE — TELEPHONE ENCOUNTER
Called pt to schedule her nutrition appt with William Cons - Pre Op - Referred by Xochilt Cash. Appt can be in person or phone - Per William Cons    No answer, left vm.

## 2023-12-05 ENCOUNTER — TELEMEDICINE (OUTPATIENT)
Age: 43
End: 2023-12-05
Payer: COMMERCIAL

## 2023-12-05 VITALS — BODY MASS INDEX: 26.36 KG/M2 | HEIGHT: 66 IN | WEIGHT: 164 LBS

## 2023-12-05 DIAGNOSIS — K91.89 COMPLICATIONS OF GASTRIC BYPASS SURGERY: Primary | ICD-10-CM
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
## 2023-12-05 PROCEDURE — 99214 OFFICE O/P EST MOD 30 MIN: CPT | Performed by: SURGERY

## 2023-12-05 ASSESSMENT — PATIENT HEALTH QUESTIONNAIRE - PHQ9
SUM OF ALL RESPONSES TO PHQ9 QUESTIONS 1 & 2: 0
SUM OF ALL RESPONSES TO PHQ QUESTIONS 1-9: 0
2. FEELING DOWN, DEPRESSED OR HOPELESS: 0
SUM OF ALL RESPONSES TO PHQ QUESTIONS 1-9: 0
SUM OF ALL RESPONSES TO PHQ QUESTIONS 1-9: 0
1. LITTLE INTEREST OR PLEASURE IN DOING THINGS: 0
SUM OF ALL RESPONSES TO PHQ QUESTIONS 1-9: 0

## 2023-12-11 ENCOUNTER — OFFICE VISIT (OUTPATIENT)
Age: 43
End: 2023-12-11

## 2023-12-11 DIAGNOSIS — E66.01 MORBID OBESITY (HCC): Primary | ICD-10-CM

## 2023-12-11 NOTE — PROGRESS NOTES
nurse practitioner. NUTRITION MONITORING AND EVALUATION:    The following goals were established with patient; Review 2 weeks post-op full liquid diet provided. Allow only the liquids listed on the handout for first 2 weeks s/p reversal. Stay on full liquid diet until diet advanced by nurse practitioner. Aim for minimum 64 oz clear liquids per day to prevent dehydration. Track fluid intake to help achieve adequate intake. Plan for 3 protein shakes per day. Can add a 4th shake if hungry/able to tolerate. Take a daily multivitamin. Follow up with RD PRN. Specific tips and techniques to facilitate compliance with above recommendations were provided and discussed. If further details are desired please feel free to contact me at [phone]. This phone number was also provided to the patient for any further questions or concerns.            Taz Stinson RD

## 2023-12-13 ENCOUNTER — ANESTHESIA EVENT (OUTPATIENT)
Facility: HOSPITAL | Age: 43
End: 2023-12-13
Payer: COMMERCIAL

## 2023-12-14 ENCOUNTER — ANESTHESIA (OUTPATIENT)
Facility: HOSPITAL | Age: 43
End: 2023-12-14
Payer: COMMERCIAL

## 2023-12-14 PROBLEM — K91.89 COMPLICATIONS OF GASTRIC BYPASS SURGERY: Status: ACTIVE | Noted: 2023-12-14

## 2023-12-14 PROBLEM — O99.841: Status: ACTIVE | Noted: 2023-12-14

## 2023-12-14 PROBLEM — K31.1 GASTRIC OUTLET OBSTRUCTION: Status: ACTIVE | Noted: 2023-12-14

## 2023-12-14 PROBLEM — O99.841: Status: RESOLVED | Noted: 2023-12-14 | Resolved: 2023-12-14
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
## 2023-12-14 PROBLEM — E46 PROTEIN-CALORIE MALNUTRITION (HCC): Status: ACTIVE | Noted: 2023-12-14

## 2023-12-14 PROBLEM — Z98.84 HISTORY OF ROUX-EN-Y GASTRIC BYPASS: Status: ACTIVE | Noted: 2023-12-14

## 2023-12-14 PROCEDURE — 6360000002 HC RX W HCPCS: Performed by: SURGERY

## 2023-12-14 PROCEDURE — 6360000002 HC RX W HCPCS: Performed by: NURSE ANESTHETIST, CERTIFIED REGISTERED

## 2023-12-14 PROCEDURE — 2500000003 HC RX 250 WO HCPCS: Performed by: NURSE ANESTHETIST, CERTIFIED REGISTERED

## 2023-12-14 PROCEDURE — 2580000003 HC RX 258: Performed by: NURSE ANESTHETIST, CERTIFIED REGISTERED

## 2023-12-14 PROCEDURE — 2580000003 HC RX 258: Performed by: SURGERY

## 2023-12-14 RX ORDER — PROPOFOL 10 MG/ML
INJECTION, EMULSION INTRAVENOUS PRN
Status: DISCONTINUED | OUTPATIENT
Start: 2023-12-14 | End: 2023-12-14 | Stop reason: SDUPTHER

## 2023-12-14 RX ORDER — MAGNESIUM SULFATE HEPTAHYDRATE 40 MG/ML
INJECTION, SOLUTION INTRAVENOUS PRN
Status: DISCONTINUED | OUTPATIENT
Start: 2023-12-14 | End: 2023-12-14 | Stop reason: SDUPTHER

## 2023-12-14 RX ORDER — GLYCOPYRROLATE 0.2 MG/ML
INJECTION, SOLUTION INTRAMUSCULAR; INTRAVENOUS PRN
Status: DISCONTINUED | OUTPATIENT
Start: 2023-12-14 | End: 2023-12-14 | Stop reason: SDUPTHER

## 2023-12-14 RX ORDER — KETAMINE HCL IN NACL, ISO-OSM 100MG/10ML
SYRINGE (ML) INJECTION PRN
Status: DISCONTINUED | OUTPATIENT
Start: 2023-12-14 | End: 2023-12-14 | Stop reason: SDUPTHER

## 2023-12-14 RX ORDER — FENTANYL CITRATE 50 UG/ML
INJECTION, SOLUTION INTRAMUSCULAR; INTRAVENOUS PRN
Status: DISCONTINUED | OUTPATIENT
Start: 2023-12-14 | End: 2023-12-14 | Stop reason: SDUPTHER

## 2023-12-14 RX ORDER — MIDAZOLAM HYDROCHLORIDE 1 MG/ML
INJECTION INTRAMUSCULAR; INTRAVENOUS PRN
Status: DISCONTINUED | OUTPATIENT
Start: 2023-12-14 | End: 2023-12-14 | Stop reason: SDUPTHER

## 2023-12-14 RX ORDER — SUCCINYLCHOLINE/SOD CL,ISO/PF 200MG/10ML
SYRINGE (ML) INTRAVENOUS PRN
Status: DISCONTINUED | OUTPATIENT
Start: 2023-12-14 | End: 2023-12-14 | Stop reason: SDUPTHER

## 2023-12-14 RX ORDER — SODIUM CHLORIDE, SODIUM LACTATE, POTASSIUM CHLORIDE, CALCIUM CHLORIDE 600; 310; 30; 20 MG/100ML; MG/100ML; MG/100ML; MG/100ML
INJECTION, SOLUTION INTRAVENOUS CONTINUOUS PRN
Status: DISCONTINUED | OUTPATIENT
Start: 2023-12-14 | End: 2023-12-14 | Stop reason: SDUPTHER

## 2023-12-14 RX ORDER — NEOSTIGMINE METHYLSULFATE 1 MG/ML
INJECTION, SOLUTION INTRAVENOUS PRN
Status: DISCONTINUED | OUTPATIENT
Start: 2023-12-14 | End: 2023-12-14 | Stop reason: SDUPTHER

## 2023-12-14 RX ORDER — DEXAMETHASONE SODIUM PHOSPHATE 4 MG/ML
INJECTION, SOLUTION INTRA-ARTICULAR; INTRALESIONAL; INTRAMUSCULAR; INTRAVENOUS; SOFT TISSUE PRN
Status: DISCONTINUED | OUTPATIENT
Start: 2023-12-14 | End: 2023-12-14 | Stop reason: SDUPTHER

## 2023-12-14 RX ORDER — LIDOCAINE HYDROCHLORIDE 20 MG/ML
INJECTION, SOLUTION EPIDURAL; INFILTRATION; INTRACAUDAL; PERINEURAL PRN
Status: DISCONTINUED | OUTPATIENT
Start: 2023-12-14 | End: 2023-12-14 | Stop reason: SDUPTHER

## 2023-12-14 RX ORDER — ONDANSETRON 2 MG/ML
INJECTION INTRAMUSCULAR; INTRAVENOUS PRN
Status: DISCONTINUED | OUTPATIENT
Start: 2023-12-14 | End: 2023-12-14 | Stop reason: SDUPTHER

## 2023-12-14 RX ORDER — ROCURONIUM BROMIDE 10 MG/ML
INJECTION, SOLUTION INTRAVENOUS PRN
Status: DISCONTINUED | OUTPATIENT
Start: 2023-12-14 | End: 2023-12-14 | Stop reason: SDUPTHER

## 2023-12-14 RX ORDER — LIDOCAINE HYDROCHLORIDE ANHYDROUS AND DEXTROSE MONOHYDRATE 5; 400 G/100ML; MG/100ML
INJECTION, SOLUTION INTRAVENOUS CONTINUOUS PRN
Status: DISCONTINUED | OUTPATIENT
Start: 2023-12-14 | End: 2023-12-14 | Stop reason: SDUPTHER

## 2023-12-14 RX ORDER — SODIUM CHLORIDE, SODIUM LACTATE, POTASSIUM CHLORIDE, AND CALCIUM CHLORIDE .6; .31; .03; .02 G/100ML; G/100ML; G/100ML; G/100ML
INJECTION, SOLUTION INTRAVENOUS PRN
Status: DISCONTINUED | OUTPATIENT
Start: 2023-12-14 | End: 2023-12-14

## 2023-12-14 RX ORDER — HYDROMORPHONE HYDROCHLORIDE 1 MG/ML
INJECTION, SOLUTION INTRAMUSCULAR; INTRAVENOUS; SUBCUTANEOUS PRN
Status: DISCONTINUED | OUTPATIENT
Start: 2023-12-14 | End: 2023-12-14 | Stop reason: SDUPTHER

## 2023-12-14 RX ADMIN — MAGNESIUM SULFATE HEPTAHYDRATE 2000 MG: 40 INJECTION, SOLUTION INTRAVENOUS at 10:45

## 2023-12-14 RX ADMIN — HYDROMORPHONE HYDROCHLORIDE 0.5 MG: 1 INJECTION, SOLUTION INTRAMUSCULAR; INTRAVENOUS; SUBCUTANEOUS at 12:50

## 2023-12-14 RX ADMIN — PROPOFOL 140 MG: 10 INJECTION, EMULSION INTRAVENOUS at 10:40

## 2023-12-14 RX ADMIN — GLYCOPYRROLATE 0.4 MG: 0.2 INJECTION, SOLUTION INTRAMUSCULAR; INTRAVENOUS at 12:24

## 2023-12-14 RX ADMIN — ONDANSETRON 4 MG: 2 INJECTION INTRAMUSCULAR; INTRAVENOUS at 12:17

## 2023-12-14 RX ADMIN — LIDOCAINE HYDROCHLORIDE 70 MG: 20 INJECTION, SOLUTION EPIDURAL; INFILTRATION; INTRACAUDAL; PERINEURAL at 10:40

## 2023-12-14 RX ADMIN — METRONIDAZOLE 500 MG: 5 INJECTION, SOLUTION INTRAVENOUS at 10:44

## 2023-12-14 RX ADMIN — ROCURONIUM BROMIDE 30 MG: 10 INJECTION, SOLUTION INTRAVENOUS at 10:44

## 2023-12-14 RX ADMIN — PHENYLEPHRINE HYDROCHLORIDE 200 MCG: 10 INJECTION INTRAVENOUS at 12:15

## 2023-12-14 RX ADMIN — HYDROMORPHONE HYDROCHLORIDE 0.5 MG: 1 INJECTION, SOLUTION INTRAMUSCULAR; INTRAVENOUS; SUBCUTANEOUS at 11:26

## 2023-12-14 RX ADMIN — LIDOCAINE HYDROCHLORIDE 1.5 MG/KG/HR: 4 INJECTION, SOLUTION INTRAVENOUS at 10:45

## 2023-12-14 RX ADMIN — PROPOFOL 30 MG: 10 INJECTION, EMULSION INTRAVENOUS at 12:30

## 2023-12-14 RX ADMIN — Medication 150 MG: at 10:40

## 2023-12-14 RX ADMIN — HYDROMORPHONE HYDROCHLORIDE 0.5 MG: 1 INJECTION, SOLUTION INTRAMUSCULAR; INTRAVENOUS; SUBCUTANEOUS at 12:52

## 2023-12-14 RX ADMIN — SODIUM CHLORIDE, POTASSIUM CHLORIDE, SODIUM LACTATE AND CALCIUM CHLORIDE: 600; 310; 30; 20 INJECTION, SOLUTION INTRAVENOUS at 10:29

## 2023-12-14 RX ADMIN — FENTANYL CITRATE 50 MCG: 50 INJECTION, SOLUTION INTRAMUSCULAR; INTRAVENOUS at 10:28

## 2023-12-14 RX ADMIN — Medication 25 MG: at 10:44

## 2023-12-14 RX ADMIN — WATER 2000 MG: 1 INJECTION INTRAMUSCULAR; INTRAVENOUS; SUBCUTANEOUS at 10:44

## 2023-12-14 RX ADMIN — MIDAZOLAM 2 MG: 1 INJECTION INTRAMUSCULAR; INTRAVENOUS at 10:28

## 2023-12-14 RX ADMIN — Medication 25 MG: at 10:45

## 2023-12-14 RX ADMIN — PHENYLEPHRINE HYDROCHLORIDE 200 MCG: 10 INJECTION INTRAVENOUS at 10:41

## 2023-12-14 RX ADMIN — LIDOCAINE HYDROCHLORIDE 1.5 MG: 20 INJECTION, SOLUTION EPIDURAL; INFILTRATION; INTRACAUDAL; PERINEURAL at 10:41

## 2023-12-14 RX ADMIN — PHENYLEPHRINE HYDROCHLORIDE 40 MCG/MIN: 10 INJECTION INTRAVENOUS at 10:39

## 2023-12-14 RX ADMIN — HYDROMORPHONE HYDROCHLORIDE 0.5 MG: 1 INJECTION, SOLUTION INTRAMUSCULAR; INTRAVENOUS; SUBCUTANEOUS at 11:56

## 2023-12-14 RX ADMIN — PHENYLEPHRINE HYDROCHLORIDE 120 MCG: 10 INJECTION INTRAVENOUS at 12:11

## 2023-12-14 RX ADMIN — FENTANYL CITRATE 50 MCG: 50 INJECTION, SOLUTION INTRAMUSCULAR; INTRAVENOUS at 10:40

## 2023-12-14 RX ADMIN — ROCURONIUM BROMIDE 5 MG: 10 INJECTION, SOLUTION INTRAVENOUS at 10:40

## 2023-12-14 RX ADMIN — DEXAMETHASONE SODIUM PHOSPHATE 4 MG: 4 INJECTION INTRA-ARTICULAR; INTRALESIONAL; INTRAMUSCULAR; INTRAVENOUS; SOFT TISSUE at 10:45

## 2023-12-14 RX ADMIN — NEOSTIGMINE METHYLSULFATE 3 MG: 1 INJECTION, SOLUTION INTRAVENOUS at 12:24

## 2023-12-29 ENCOUNTER — OFFICE VISIT (OUTPATIENT)
Age: 43
End: 2023-12-29

## 2023-12-29 VITALS
WEIGHT: 168 LBS | RESPIRATION RATE: 18 BRPM | HEIGHT: 66 IN | TEMPERATURE: 97.6 F | BODY MASS INDEX: 27 KG/M2 | SYSTOLIC BLOOD PRESSURE: 91 MMHG | OXYGEN SATURATION: 96 % | HEART RATE: 64 BPM | DIASTOLIC BLOOD PRESSURE: 60 MMHG

## 2023-12-29 DIAGNOSIS — Z93.4 JEJUNOSTOMY TUBE IN SITU (HCC): ICD-10-CM

## 2023-12-29 DIAGNOSIS — Z09 SURGICAL FOLLOW-UP CARE: Primary | ICD-10-CM

## 2023-12-29 PROCEDURE — 99024 POSTOP FOLLOW-UP VISIT: CPT | Performed by: NURSE PRACTITIONER

## 2023-12-29 NOTE — PATIENT INSTRUCTIONS
Continue to use your feeding tube as primary source of nutrition and aim for 4 cartons daily     Flush tube with 2 syringes full of water before and after feeding    Increase water in the tube if you are not drinking at least 3 bottles of water throughout the day     Continue with soft and mushy / moist foods

## 2024-01-04 ENCOUNTER — TELEPHONE (OUTPATIENT)
Age: 44
End: 2024-01-04

## 2024-01-04 NOTE — TELEPHONE ENCOUNTER
I called and requested a return call. I told her I was calling to do my 3 week bariatric post op check.

## 2024-01-09 ENCOUNTER — HOSPITAL ENCOUNTER (OUTPATIENT)
Facility: HOSPITAL | Age: 44
Setting detail: INFUSION SERIES
Discharge: HOME | End: 2024-01-09
Payer: COMMERCIAL

## 2024-01-09 VITALS
SYSTOLIC BLOOD PRESSURE: 107 MMHG | TEMPERATURE: 98.1 F | RESPIRATION RATE: 18 BRPM | HEART RATE: 72 BPM | DIASTOLIC BLOOD PRESSURE: 60 MMHG

## 2024-01-09 DIAGNOSIS — E86.0 DEHYDRATION: Primary | ICD-10-CM

## 2024-01-09 PROCEDURE — 2580000003 HC RX 258: Performed by: SURGERY

## 2024-01-09 PROCEDURE — 2500000003 HC RX 250 WO HCPCS: Performed by: SURGERY

## 2024-01-09 PROCEDURE — 96361 HYDRATE IV INFUSION ADD-ON: CPT

## 2024-01-09 PROCEDURE — 96360 HYDRATION IV INFUSION INIT: CPT

## 2024-01-09 PROCEDURE — 6360000002 HC RX W HCPCS: Performed by: SURGERY

## 2024-01-09 PROCEDURE — 96365 THER/PROPH/DIAG IV INF INIT: CPT

## 2024-01-09 RX ORDER — SODIUM CHLORIDE, SODIUM LACTATE, POTASSIUM CHLORIDE, AND CALCIUM CHLORIDE .6; .31; .03; .02 G/100ML; G/100ML; G/100ML; G/100ML
1000 INJECTION, SOLUTION INTRAVENOUS ONCE
Status: CANCELLED
Start: 2024-01-09 | End: 2024-01-09

## 2024-01-09 RX ORDER — SODIUM CHLORIDE, SODIUM LACTATE, POTASSIUM CHLORIDE, AND CALCIUM CHLORIDE .6; .31; .03; .02 G/100ML; G/100ML; G/100ML; G/100ML
1000 INJECTION, SOLUTION INTRAVENOUS ONCE
Status: COMPLETED | OUTPATIENT
Start: 2024-01-09 | End: 2024-01-09

## 2024-01-09 RX ADMIN — SODIUM CHLORIDE, POTASSIUM CHLORIDE, SODIUM LACTATE AND CALCIUM CHLORIDE 1000 ML: 600; 310; 30; 20 INJECTION, SOLUTION INTRAVENOUS at 13:06

## 2024-01-09 RX ADMIN — THIAMINE HYDROCHLORIDE: 100 INJECTION, SOLUTION INTRAMUSCULAR; INTRAVENOUS at 14:09

## 2024-01-09 NOTE — TELEPHONE ENCOUNTER
I spoke with the surgeon. Patient complained of feeling jittery. Reported an episode from a couple of days ago where she slept for almost 2 days. Poor PO fluid intake. Reported poor PO intake. When asked what she ate yesterday she said, \" I ate half of a chicken sandwich yesterday. I cooked Chicken on Sunday and ate that also.\" Doctor ordered IV fluids via [facility]. Patient scheduled for 2 liters today at 1:00. Patient aware of scheduled infusion and asked to arrive by 12:45.

## 2024-01-09 NOTE — PROGRESS NOTES
[facility] Peds/Adult Note                   Date: 2024    Name: Sharri Pruitt    MRN: [id number]       : 1980    1300 Patient arrives for IV Hydration without acute problems. Please see Epic for complete assessment and education provided.    Vital signs stable throughout and prior to discharge. Patient tolerated procedure well and was discharged without incident.  Patient is aware of no further OPI appointments and will follow up with their healthcare provider      Ms. Pruitt's vitals were reviewed prior to and after treatment.   Patient Vitals for the past 12 hrs:   Temp Pulse Resp BP   24 1255 98.1 °F (36.7 °C) 72 18 107/60     Medications given:   Medications Administered         lactated ringers bolus bolus 1,000 mL Admin Date  2024 Action  New Bag Dose  1,000 mL Rate  1,000 mL/hr Route  IntraVENous Administered By  Collette Keyes RN        sodium chloride 0.9 % 1,000 mL with folic acid 1 mg, adult multi-vitamin with vitamin k 10 mL, thiamine 100 mg Admin Date  2024 Action  New Bag Dose   Rate  1,000 mL/hr Route  IntraVENous Administered By  Collette Keyes RN          Ms. Pruitt tolerated the infusion, and had no complaints.    [facility].     Future Appointments   Date Time Provider Department Center   2024  8:40 AM Thais Burgess APRN - NP [facility] AMB   2024  9:15 AM Marko Keyes MD [facility] AMB       COLLETTE KEYES RN  2024  3:49 PM

## 2024-01-09 NOTE — TELEPHONE ENCOUNTER
Bariatric Post Op Call: Week 3     Hydration: Less than 32 ounces of water daily is fair to poor (Goal is 64 ounces per day)  Poor [] Fair []  Good [] Great [x]    Amount: Unsure of exact amount, G -Tube in placement     Comment: Reports flushing the G-tube w/ 60 cc's of water 3 times a day.     Ambulation: walking at least 3x/ week for 30 minutes   Poor [] Fair []  Good [] Great [x]    Comment:    Urine Color: Question of any odor and color (should be wisam, pale, and clear)   Dark [] Wisam [] Pale [x] Clear []    Comment: Reported cloudy urine for a few days and dark yellow, No odor    Diet: Question any nausea and/or vomiting.   Protein intake (ultimately goal is 60 grams of protein daily and at this stage they should be meeting goal).  Poor [] Fair [x]  Good [] Great []    Comment: Nausea every morning, no vomiting, taking Zofran       Bowel movements: Question of any constipation- haven't had any bowel movements for more than 3 days. This could be related to protein, fluid intake, medications and activity.     Comment: Regular BM's reported    Incision: (No redness, pain, swelling or fever)     Healing Well [x] Redness [] Pain [] Drainage [] Swelling []    Comment: No Fever    Pain: Right sided incisional (usually the largest incision with deep stitch) abdominal pain is normal (should be less than 3). This should be better by 3 weeks post op.   Pain 0 - 10: 0    Comment (are they taking pain medication and is it helping? Abdominal support / splinting/ ice or heat?): No      Referred to provider: MD  Next Appointment:  1/11/24   Support Group: 2nd Thursday every month from 6-7 pm on Zoom     Red Flags = prompt referral to a bariatric team provider for follow up    Fever > 101  Vomiting and not tolerating liquids   Weak and dizzy / lightheaded   Dark urine   Abdominal pain despite medication, splinting, ice or heat   SOB  Calf swelling and or redness   Chest pain

## 2024-01-11 ENCOUNTER — TELEMEDICINE (OUTPATIENT)
Age: 44
End: 2024-01-11

## 2024-01-11 ENCOUNTER — TELEPHONE (OUTPATIENT)
Age: 44
End: 2024-01-11

## 2024-01-11 DIAGNOSIS — Z09 SURGICAL FOLLOW-UP CARE: Primary | ICD-10-CM

## 2024-01-11 PROCEDURE — 99024 POSTOP FOLLOW-UP VISIT: CPT | Performed by: NURSE PRACTITIONER

## 2024-01-11 ASSESSMENT — PATIENT HEALTH QUESTIONNAIRE - PHQ9
SUM OF ALL RESPONSES TO PHQ9 QUESTIONS 1 & 2: 0
SUM OF ALL RESPONSES TO PHQ QUESTIONS 1-9: 0
SUM OF ALL RESPONSES TO PHQ QUESTIONS 1-9: 0
2. FEELING DOWN, DEPRESSED OR HOPELESS: 0
SUM OF ALL RESPONSES TO PHQ QUESTIONS 1-9: 0
SUM OF ALL RESPONSES TO PHQ QUESTIONS 1-9: 0
1. LITTLE INTEREST OR PLEASURE IN DOING THINGS: 0

## 2024-01-11 NOTE — PROGRESS NOTES
Identified patient with two patient identifiers (name and ). Reviewed chart in preparation for visit and have obtained necessary documentation.    Sharri Pruitt is a 43 y.o. female  Chief Complaint   Patient presents with    Post-Op Check     4 weeks s/p ROBOTIC REVERSAL GASTRIC BYPASS, ESOPHAGOGASTRODUODENOSCOPY (ERAS), J-TUBE PLACEMENT AND PYLORAPLASTY     There were no vitals taken for this visit.    1. Have you been to the ER, urgent care clinic since your last visit?  Hospitalized since your last visit?no    2. Have you seen or consulted any other health care providers outside of the [facility] since your last visit?  Include any pap smears or colon screening. no

## 2024-01-11 NOTE — TELEPHONE ENCOUNTER
I called the patient and left a message requesting a return call. I called to check her in for her 8:40 Virtual appointment.

## 2024-01-11 NOTE — PATIENT INSTRUCTIONS
supplement with protein shakes/powder to meet protein goals.  Take small bites.  Try eating with smaller utensils (baby spoon, cocktail fork).  Chew food thoroughly  Allow about 30 minutes to eat a meal.  Eating too fast may cause nausea or vomiting.  Stop eating as soon as you feel full. Overeating may stretch your stomach's capacity and prevent desired weight loss.  Do not drink liquids during meals and 30 minutes after meals.  Drinking with meals may cause nausea or vomiting.  Add one new food at a time  Take vitamins daily  No lifting greater than 40 lbs.   You can do light jogging and walking.   You may go into a pool.     What to do if you are constipated:  You may  take Milk of Magnesia. Take 2 Tablespoons followed by 16 oz of water then 2 hours later take another 2 tablespoons. If  milk of magnesia does not work then take Benefiber or Miralax over the counter. Keep in mind that the Benefiber or Miralax may take a day or two to work. If all of the above do not work try a Fleets enema and follow the directions on the box.     If you are not able to tolerate liquids or soft foods.     Please call our office  [phone]  If you have vomiting and persistent epigastric pain or chest pain. You should call our office, the doctor on-call or go to the emergency room.       If you would like to make an appointment with one of the bariatric dietitians, please call the bariatric line at [phone].  Appointments are offered in person and virtual at no charge.     
[facility] Orthopedic Surgery  Orthopedic Surgery  [address], 3rd & [address]th [place] [address]  Phone: [phone]  Fax:   Follow Up Time: 7-10 Days

## 2024-01-12 NOTE — PROGRESS NOTES
Chief Complaint   Patient presents with    Post-Op Check     4 weeks s/p ROBOTIC REVERSAL GASTRIC BYPASS, ESOPHAGOGASTRODUODENOSCOPY (ERAS), J-TUBE PLACEMENT AND PYLORAPLASTY       Sharri Pruitt is 4 weeks status post reversal of gastric bypass.  Presents today for surgical follow up care. She is upset today because she lost her job. She needs to work and is asking to have a return to work note for Monday.   Says she is using her tube; however, was sent to [facility] last week with dehydration.     Weight is up   Has not tried any new foods     Pain: minimal     Protein intake: tube feeds 4 cartons / day is goal and uncertain how much she is taking in     Fluid intake: minimal po and can use the tube for water     Bowels moving most days     Constipated  prn  Using laxatives prn    Nausea  prn  Regurgitation  No    + food aversion and anorexia     No fever or chills, chest pain or shortness of breath.    Vitamin compliance _no__ Bariatric MVI 45 mg iron  _no__ Calcium Citrate 1200 -1500 mg daily in divided doses     Last Weight Metrics:      12/29/2023     8:25 AM 12/15/2023    11:53 AM 12/14/2023     8:02 AM 12/5/2023     9:00 AM 11/28/2023     1:45 PM 11/20/2023     9:48 AM 10/31/2023     3:02 PM   Weight Loss Metrics   Height 5' 6\" 5' 6\" 5' 6\" 5' 6\" 5' 6\" 5' 6\" 5' 5\"   Weight - Scale 168 lbs  164 lbs 164 lbs 164 lbs 164 lbs 14 oz 162 lbs   BMI (Calculated) 27.2 kg/m2  26.5 kg/m2 26.5 kg/m2 26.5 kg/m2 26.7 kg/m2 27 kg/m2       Physical Exam  There were no vitals taken for this visit.  A + O x 3, tearful and is connected to feeding pump during visit   Mucous membranes appear moist  Breathing unlabored   Abdomen appears soft and well healed  ambulating independently      Diagnosis Orders   1. Surgical follow-up care        2. BMI 27.0-27.9,adult            Sharri Pruitt is 4 weeks  s/p reversal of gastric bypass   Weight is up some   She is still limited with po intake due to psychological factors   Upset today

## 2024-01-25 ENCOUNTER — TELEPHONE (OUTPATIENT)
Age: 44
End: 2024-01-25

## 2024-01-25 NOTE — TELEPHONE ENCOUNTER
----- Message from Marko Keyes MD sent at 1/25/2024  3:07 PM EST -----  Regarding: Visit tomorrow  Please try to get her to come in tomorrow in person. Thanks

## 2024-01-25 NOTE — TELEPHONE ENCOUNTER
I called and left a message. I informed the patient that the doctor would like to see her in person tomorrow. I will have the staff change her 9:15 am appointment from a virtual visit to an in office visit.

## 2024-02-07 ENCOUNTER — TELEPHONE (OUTPATIENT)
Age: 44
End: 2024-02-07

## 2024-02-08 ENCOUNTER — OFFICE VISIT (OUTPATIENT)
Age: 44
End: 2024-02-08

## 2024-02-08 VITALS
WEIGHT: 168.4 LBS | SYSTOLIC BLOOD PRESSURE: 112 MMHG | TEMPERATURE: 99.1 F | DIASTOLIC BLOOD PRESSURE: 62 MMHG | OXYGEN SATURATION: 98 % | HEART RATE: 88 BPM | BODY MASS INDEX: 27.06 KG/M2 | HEIGHT: 66 IN | RESPIRATION RATE: 18 BRPM

## 2024-02-08 DIAGNOSIS — Z98.84 PERSONAL HX OF GASTRIC BYPASS: ICD-10-CM

## 2024-02-08 DIAGNOSIS — Z09 SURGICAL FOLLOW-UP CARE: Primary | ICD-10-CM

## 2024-02-08 DIAGNOSIS — Z93.4 JEJUNOSTOMY TUBE IN SITU (HCC): ICD-10-CM

## 2024-02-08 ASSESSMENT — PATIENT HEALTH QUESTIONNAIRE - PHQ9
SUM OF ALL RESPONSES TO PHQ QUESTIONS 1-9: 0
SUM OF ALL RESPONSES TO PHQ9 QUESTIONS 1 & 2: 0
SUM OF ALL RESPONSES TO PHQ QUESTIONS 1-9: 0
2. FEELING DOWN, DEPRESSED OR HOPELESS: 0
SUM OF ALL RESPONSES TO PHQ QUESTIONS 1-9: 0
SUM OF ALL RESPONSES TO PHQ QUESTIONS 1-9: 0
1. LITTLE INTEREST OR PLEASURE IN DOING THINGS: 0

## 2024-02-08 NOTE — PROGRESS NOTES
Identified pt with two pt identifiers (name and ). Reviewed chart in preparation for visit and have obtained necessary documentation.    Sharri Pruitt is a 43 y.o. female  Chief Complaint   Patient presents with    Post-Op Check     1.5 months s/p ROBOTIC REVERSAL GASTRIC BYPASS, ESOPHAGOGASTRODUODENOSCOPY (ERAS), J-TUBE PLACEMENT AND PYLORAPLASTY     /62 (Site: Right Upper Arm, Position: Sitting, Cuff Size: Small Adult)   Pulse 88   Temp 99.1 °F (37.3 °C) (Oral)   Resp 18   Ht 1.676 m (5' 6\")   Wt 76.4 kg (168 lb 6.4 oz)   SpO2 98%   BMI 27.18 kg/m²     1. Have you been to the ER, urgent care clinic since your last visit?  Hospitalized since your last visit?no    2. Have you seen or consulted any other health care providers outside of the [facility] since your last visit?  Include any pap smears or colon screening. no

## 2024-02-08 NOTE — PROGRESS NOTES
Subjective:      Sharri Pruitt is a 43 y.o. female presents for postop care 1.5 months status post ROBOTIC REVERSAL GASTRIC BYPASS, ESOPHAGOGASTRODUODENOSCOPY (ERAS), J-TUBE PLACEMENT AND PYLORAPLAST   She is drinking 3 bottles of water a day.   She is still using her tube to supplement fluids.  She is no longer using protein shakes through her J-tube.  She is eating foods and tolerating it well.  Breakfast-2 eggs and toast, dominguez  Lunch meat and vegetable  Dinner meat and vegetable  Snack cup of noodles  Patient presents for complaints of drainage around her J-tube.  Drainage is brown and yellow.    She is maintaining her weight    Ms. Pruitt has a reminder for a \"due or due soon\" health maintenance. I have asked that she contact her primary care provider for follow-up on this health maintenance.      Objective:     /62 (Site: Right Upper Arm, Position: Sitting, Cuff Size: Small Adult)   Pulse 88   Temp 99.1 °F (37.3 °C) (Oral)   Resp 18   Ht 1.676 m (5' 6\")   Wt 76.4 kg (168 lb 6.4 oz)   SpO2 98%   BMI 27.18 kg/m²     General:  alert, cooperative, no distress   Abdomen: soft, tender at J-tube site    J-tube site with 2 areas of hypergranulation tissue.  Silver nitrate applied     Assessment:     Doing well postoperatively.    Plan:     Advised to continue diet as tolerated  Continue supplementing fluids through J-tube  Call our office if the area of hypergranulation tissue does not go away, she may need another application of silver nitrate  Follow-up with Dr. Keyes in 6 to 8 weeks  FERMIN Hayes - NP

## 2024-02-16 ENCOUNTER — OFFICE VISIT (OUTPATIENT)
Age: 44
End: 2024-02-16
Payer: COMMERCIAL

## 2024-02-16 VITALS — WEIGHT: 171 LBS | SYSTOLIC BLOOD PRESSURE: 107 MMHG | BODY MASS INDEX: 27.6 KG/M2 | DIASTOLIC BLOOD PRESSURE: 72 MMHG

## 2024-02-16 DIAGNOSIS — B37.31 VULVOVAGINAL CANDIDIASIS: ICD-10-CM

## 2024-02-16 DIAGNOSIS — N76.1 CHRONIC VAGINITIS: Primary | ICD-10-CM

## 2024-02-16 PROCEDURE — 99213 OFFICE O/P EST LOW 20 MIN: CPT | Performed by: STUDENT IN AN ORGANIZED HEALTH CARE EDUCATION/TRAINING PROGRAM

## 2024-02-16 RX ORDER — FLUCONAZOLE 150 MG/1
150 TABLET ORAL ONCE
Qty: 1 TABLET | Refills: 0 | Status: SHIPPED | OUTPATIENT
Start: 2024-02-16 | End: 2024-02-16

## 2024-02-16 RX ORDER — METRONIDAZOLE 500 MG/1
500 TABLET ORAL 2 TIMES DAILY
Qty: 14 TABLET | Refills: 3 | Status: SHIPPED | OUTPATIENT
Start: 2024-02-16 | End: 2024-02-23

## 2024-02-16 RX ORDER — PANTOPRAZOLE SODIUM 40 MG/1
40 TABLET, DELAYED RELEASE ORAL DAILY
COMMUNITY
Start: 2024-02-07

## 2024-02-16 NOTE — PROGRESS NOTES
Sharri Pruitt is a 43 y.o. female presents for a problem visit.    Chief Complaint   Patient presents with    Vaginitis     No LMP recorded. Patient has had an injection.  Birth Control: Depo-Provera injections.  Last Pap: unsatisfactory, obtained 9 month(s) ago on 5/19/23.    The patient is reporting having recurrent BV.   Has some clumpiness \"discharge\" and slight odor here and there.      Examination chaperoned by Su Antoine MA.

## 2024-02-16 NOTE — PROGRESS NOTES
OB/GYN Problem VIsit    HPI  Sharri Pruitt is a ,  43 y.o. female who presents for a problem visit.     Here today for vaginal discharge and odor x 2w. Typical for prior instances of bacterial vaginosis. Previously was rx'd metronidazole gel per Dr. Vines and used this for a couple days and the odor improved but the discharge is still there.   Triggers in the past have been sex and baths.     Past Medical History:   Diagnosis Date    Anemia     Anxiety     GERD (gastroesophageal reflux disease)     Hepatitis C 2010    treated    History of blood transfusion     SEVERAL TRNASFUSIONS LAST ONE  NO REACTIONS TO ANY INFUSIONS    History of MRSA infection     Hx of abnormal Pap smear     Ill-defined condition     anemia hx, has received several blood transfusion, last blood transfusion May 5 2014    Intestinal perforation (HCC)     Psychiatric disorder     OCD    PUD (peptic ulcer disease)      Past Surgical History:   Procedure Laterality Date    ESOPHAGEAL DILATATION      GASTRIC BYPASS SURGERY      GI  2009    perforated intestines    GYN       x 4    OTHER SURGICAL HISTORY  21    FEEDING TUBE  REMOVED 2021    LUCIANO-EN-Y GASTRIC BYPASS N/A 2023    ROBOTIC REVERSAL GASTRIC BYPASS, ESOPHAGOGASTRODUODENOSCOPY (ERAS), J-TUBE PLACEMENT AND PYLORAPLASTY performed by Marko Keyes MD at [facility] MAIN OR    UPPER GASTROINTESTINAL ENDOSCOPY N/A 10/02/2023    EGD ESOPHAGOGASTRODUODENOSCOPY performed by Marko Keyes MD at [facility] ENDOSCOPY    UPPER GASTROINTESTINAL ENDOSCOPY N/A 10/02/2023    EGD BIOPSY performed by Marko Keyes MD at [facility] ENDOSCOPY    UPPER GASTROINTESTINAL ENDOSCOPY  10/02/2023    EGD ESOPHAGOGASTRODUODENOSCOPY DILATATION performed by Marko Keyes MD at [facility] ENDOSCOPY    WISDOM TOOTH EXTRACTION       Social History     Occupational History    Not on file   Tobacco Use    Smoking status: Former     Current packs/day: 0.00     Types: Cigarettes     Quit date: 2019

## 2024-02-19 LAB
A VAGINAE DNA VAG QL NAA+PROBE: NORMAL SCORE
BVAB2 DNA VAG QL NAA+PROBE: NORMAL SCORE
C ALBICANS DNA VAG QL NAA+PROBE: NEGATIVE
C GLABRATA DNA VAG QL NAA+PROBE: NEGATIVE
C TRACH DNA VAG QL NAA+PROBE: NEGATIVE
MEGA1 DNA VAG QL NAA+PROBE: NORMAL SCORE
N GONORRHOEA DNA VAG QL NAA+PROBE: NEGATIVE
T VAGINALIS DNA VAG QL NAA+PROBE: NEGATIVE

## 2024-02-21 ENCOUNTER — OFFICE VISIT (OUTPATIENT)
Age: 44
End: 2024-02-21

## 2024-02-21 VITALS
WEIGHT: 166 LBS | HEART RATE: 66 BPM | RESPIRATION RATE: 20 BRPM | TEMPERATURE: 98.6 F | HEIGHT: 66 IN | OXYGEN SATURATION: 98 % | SYSTOLIC BLOOD PRESSURE: 93 MMHG | BODY MASS INDEX: 26.68 KG/M2 | DIASTOLIC BLOOD PRESSURE: 58 MMHG

## 2024-02-21 DIAGNOSIS — Z09 SURGICAL FOLLOW-UP CARE: Primary | ICD-10-CM

## 2024-02-21 DIAGNOSIS — Z93.4 JEJUNOSTOMY TUBE IN SITU (HCC): ICD-10-CM

## 2024-02-21 PROCEDURE — 99024 POSTOP FOLLOW-UP VISIT: CPT | Performed by: NURSE PRACTITIONER

## 2024-02-21 ASSESSMENT — PATIENT HEALTH QUESTIONNAIRE - PHQ9
2. FEELING DOWN, DEPRESSED OR HOPELESS: 2
SUM OF ALL RESPONSES TO PHQ QUESTIONS 1-9: 2
1. LITTLE INTEREST OR PLEASURE IN DOING THINGS: 0
SUM OF ALL RESPONSES TO PHQ QUESTIONS 1-9: 2
SUM OF ALL RESPONSES TO PHQ9 QUESTIONS 1 & 2: 2

## 2024-02-21 NOTE — PROGRESS NOTES
Identified patient with two patient identifiers (name and ). Reviewed chart in preparation for visit and have obtained necessary documentation.    Sharri Pruitt is a 43 y.o. female  Chief Complaint   Patient presents with    Follow-up     10 weeks s/p Gastric bypass reversal and J tube placement     BP (!) 93/58 (Site: Right Upper Arm, Position: Sitting, Cuff Size: Large Adult)   Pulse 66   Temp 98.6 °F (37 °C)   Resp 20   Ht 1.676 m (5' 6\")   Wt 75.3 kg (166 lb)   SpO2 98%   BMI 26.79 kg/m²     1. Have you been to the ER, urgent care clinic since your last visit?  Hospitalized since your last visit?no    2. Have you seen or consulted any other health care providers outside of the [facility] since your last visit?  Include any pap smears or colon screening. no

## 2024-02-21 NOTE — PROGRESS NOTES
Subjective:      Sharri Pruitt is a 43 y.o. female who presents today for J-tube check.   She states that the the J-tube feels like it is sliding out and is painful. She is still using the tube to supplement her fluids. \"I am not doing anymore than I was a week and a half ago.\"      Ms. Pruitt has a reminder for a \"due or due soon\" health maintenance. I have asked that she contact her primary care provider for follow-up on this health maintenance.          Objective:     BP (!) 93/58 (Site: Right Upper Arm, Position: Sitting, Cuff Size: Large Adult)   Pulse 66   Temp 98.6 °F (37 °C)   Resp 20   Ht 1.676 m (5' 6\")   Wt 75.3 kg (166 lb)   SpO2 98%   BMI 26.79 kg/m²     Wound:   Bumper had slid down tubing to skin. Balloon feels inflated.      Assessment:     Wound check.       Plan:     Patient states that the pain was better with the bumper closer to the skin.   Follow up with Dr. Keyes in 6 weeks.   FERMIN Hayes - NP     no

## 2024-02-24 LAB
CYTOLOGIST CVX/VAG CYTO: ABNORMAL
CYTOLOGY CVX/VAG DOC CYTO: ABNORMAL
CYTOLOGY CVX/VAG DOC THIN PREP: ABNORMAL
DX ICD CODE: ABNORMAL
DX ICD CODE: ABNORMAL
HPV GENOTYPE REFLEX: ABNORMAL
HPV I/H RISK 4 DNA CVX QL PROBE+SIG AMP: NEGATIVE
Lab: ABNORMAL
OTHER STN SPEC: ABNORMAL
PATHOLOGIST CVX/VAG CYTO: ABNORMAL
STAT OF ADQ CVX/VAG CYTO-IMP: ABNORMAL

## 2024-02-26 ENCOUNTER — TELEPHONE (OUTPATIENT)
Age: 44
End: 2024-02-26

## 2024-02-26 NOTE — TELEPHONE ENCOUNTER
I called and left a message requesting a return call. I asked her to schedule an appointment for tomorrow to see RAJIV Vanessa, whom she just recently saw.

## 2024-02-26 NOTE — TELEPHONE ENCOUNTER
Patient called stating her G tube is not working ,its slipping and not staying in place .Its also leaking

## 2024-02-27 ENCOUNTER — OFFICE VISIT (OUTPATIENT)
Age: 44
End: 2024-02-27

## 2024-02-27 VITALS
TEMPERATURE: 97.8 F | HEIGHT: 66 IN | SYSTOLIC BLOOD PRESSURE: 98 MMHG | WEIGHT: 166 LBS | BODY MASS INDEX: 26.68 KG/M2 | OXYGEN SATURATION: 99 % | DIASTOLIC BLOOD PRESSURE: 64 MMHG | HEART RATE: 80 BPM | RESPIRATION RATE: 20 BRPM

## 2024-02-27 DIAGNOSIS — Z98.890 POST-OPERATIVE STATE: Primary | ICD-10-CM

## 2024-02-27 PROCEDURE — 99024 POSTOP FOLLOW-UP VISIT: CPT | Performed by: SURGERY

## 2024-02-27 ASSESSMENT — PATIENT HEALTH QUESTIONNAIRE - PHQ9
1. LITTLE INTEREST OR PLEASURE IN DOING THINGS: 0
SUM OF ALL RESPONSES TO PHQ QUESTIONS 1-9: 0
2. FEELING DOWN, DEPRESSED OR HOPELESS: 0
SUM OF ALL RESPONSES TO PHQ9 QUESTIONS 1 & 2: 0
SUM OF ALL RESPONSES TO PHQ QUESTIONS 1-9: 0

## 2024-02-27 NOTE — PROGRESS NOTES
Identified patient with two patient identifiers (name and ). Reviewed chart in preparation for visit and have obtained necessary documentation.    Sharri Pruitt is a 43 y.o. female  Chief Complaint   Patient presents with    Follow-up     Evaluate G tube     BP 98/64 (Site: Right Upper Arm, Position: Sitting, Cuff Size: Small Adult)   Pulse 80   Temp 97.8 °F (36.6 °C) (Oral)   Resp 20   Ht 1.676 m (5' 6\")   Wt 75.3 kg (166 lb)   SpO2 99%   BMI 26.79 kg/m²     1. Have you been to the ER, urgent care clinic since your last visit?  Hospitalized since your last visit?no    2. Have you seen or consulted any other health care providers outside of the [facility] since your last visit?  Include any pap smears or colon screening. no

## 2024-02-27 NOTE — PROGRESS NOTES
Surgery Progress Note    2/27/2024    CC: J tube issues    Subjective:     Patient doing fair after gastric bypass reversal.  Still using J-tube for hydration.  She is eating solid food but because of other stressors in the home does not always focus on herself and her eating.  Here for concern about leaking at J-tube site.    Weight stable, increasing slightly    Constitutional: No fever or chills  Neurologic: No headache  Eyes: No scleral icterus or irritated eyes  Nose: No nasal pain or drainage  Mouth: No oral lesions or sore throat  Cardiac: No palpations or chest pain  Pulmonary: No cough or shortness of breath  Gastrointestinal: No nausea, emesis, diarrhea, or constipation  Genitourinary: No dysuria  Musculoskeletal: No muscle or joint tenderness  Skin: No rashes or lesions  Psychiatric: No anxiety or depressed mood    Objective:     Vitals:    02/27/24 1501   BP: 98/64   Pulse: 80   Resp: 20   Temp: 97.8 °F (36.6 °C)   SpO2: 99%     Body mass index is 26.79 kg/m².  Wt 166 lbs    General: No acute distress, conversant  Eyes: PERRLA, no scleral icterus  HENT: Normocephalic without oral lesions  Neck: Trachea midline without LAD  Cardiac: Normal pulse rate and rhythm  Pulmonary: Symmetric chest rise with normal effort  GI: Soft, NT, ND, no hernia, no splenomegaly, J-tube intact, typical healthy skin findings around the J-tube, balloon appears to be intact  Skin: Warm without rash  Extremities: No edema or joint stiffness  Psych: Appropriate mood and affect    Assessment:     43-year-old female with J-tube in place after gastric bypass reversal maintaining her weight    Plan:     Patient not quite ready to have J-tube removed.  It appears to be intact.  Will not replace.  We will see her back in a month either virtually or in person if she is ready to have it out.      Marko Keyes MD, FACS, [facility] [place]ric and General Surgeon  [facility]

## 2024-03-13 ENCOUNTER — OFFICE VISIT (OUTPATIENT)
Age: 44
End: 2024-03-13
Payer: COMMERCIAL

## 2024-03-13 VITALS
WEIGHT: 161 LBS | RESPIRATION RATE: 16 BRPM | HEART RATE: 66 BPM | TEMPERATURE: 98.1 F | BODY MASS INDEX: 25.88 KG/M2 | OXYGEN SATURATION: 97 % | SYSTOLIC BLOOD PRESSURE: 105 MMHG | DIASTOLIC BLOOD PRESSURE: 61 MMHG | HEIGHT: 66 IN

## 2024-03-13 DIAGNOSIS — F32.A DEPRESSION, UNSPECIFIED DEPRESSION TYPE: Primary | ICD-10-CM

## 2024-03-13 DIAGNOSIS — T85.528A JEJUNOSTOMY TUBE FELL OUT: ICD-10-CM

## 2024-03-13 DIAGNOSIS — R63.4 WEIGHT LOSS: ICD-10-CM

## 2024-03-13 PROCEDURE — 99214 OFFICE O/P EST MOD 30 MIN: CPT | Performed by: SURGERY

## 2024-03-13 RX ORDER — LORAZEPAM 1 MG/1
1 TABLET ORAL EVERY 8 HOURS PRN
Qty: 10 TABLET | Refills: 0 | Status: SHIPPED | OUTPATIENT
Start: 2024-03-13 | End: 2024-04-12

## 2024-03-13 ASSESSMENT — PATIENT HEALTH QUESTIONNAIRE - PHQ9
SUM OF ALL RESPONSES TO PHQ QUESTIONS 1-9: 0
SUM OF ALL RESPONSES TO PHQ9 QUESTIONS 1 & 2: 0
2. FEELING DOWN, DEPRESSED OR HOPELESS: 0
1. LITTLE INTEREST OR PLEASURE IN DOING THINGS: 0

## 2024-03-13 NOTE — PROGRESS NOTES
General Surgery Office Consultation / H & P    CC: J tube fell out  History of Present Illness:      Sharri Pruitt is a 43 y.o. female who presents with dislodged J-tube.  Patient reports that she has had severe depression with last few weeks.  She thinks she may be bipolar.  History of mirna.  Never fully treated for bipolar-ism.  Her brother is schizophrenic.  Patient eats intermittently when she is not depressed.  She was using the J-tube for water.  She has an interview today for a new job.  Generally she feels hopeless and helpless.  She feels that she is a failure as a mother and has a daughter.  She does not have the emotional support system at home for which she can confide in.  She has never seen regular psychiatric care.      Past Medical History:   Diagnosis Date    Anemia     Anxiety     GERD (gastroesophageal reflux disease)     Hepatitis C     treated    History of blood transfusion     SEVERAL TRNASFUSIONS LAST ONE  NO REACTIONS TO ANY INFUSIONS    History of MRSA infection     Hx of abnormal Pap smear     Ill-defined condition     anemia hx, has received several blood transfusion, last blood transfusion May 5 2014    Intestinal perforation (HCC)     Psychiatric disorder     OCD    PUD (peptic ulcer disease)      Past Surgical History:   Procedure Laterality Date    ESOPHAGEAL DILATATION      GASTRIC BYPASS SURGERY      GI  2009    perforated intestines    GYN       x 4    OTHER SURGICAL HISTORY  21    FEEDING TUBE  REMOVED 2021    LUCIANO-EN-Y GASTRIC BYPASS N/A 2023    ROBOTIC REVERSAL GASTRIC BYPASS, ESOPHAGOGASTRODUODENOSCOPY (ERAS), J-TUBE PLACEMENT AND PYLORAPLASTY performed by Marko Keyes MD at [facility] MAIN OR    UPPER GASTROINTESTINAL ENDOSCOPY N/A 10/02/2023    EGD ESOPHAGOGASTRODUODENOSCOPY performed by Marko Keyes MD at [facility] ENDOSCOPY    UPPER GASTROINTESTINAL ENDOSCOPY N/A 10/02/2023    EGD BIOPSY performed by Marko Keyes MD at [facility] ENDOSCOPY

## 2024-03-13 NOTE — PROGRESS NOTES
Identified patient with two patient identifiers (name and ). Reviewed chart in preparation for visit and have obtained necessary documentation.    Sharri Pruitt is a 43 y.o. female  Chief Complaint   Patient presents with    Follow-up     Possible J-tube removal.     /61 (Site: Left Upper Arm, Position: Sitting, Cuff Size: Small Adult)   Pulse 66   Temp 98.1 °F (36.7 °C) (Oral)   Resp 16   Ht 1.676 m (5' 6\")   Wt 73 kg (161 lb)   SpO2 97%   BMI 25.99 kg/m²     1. Have you been to the ER, urgent care clinic since your last visit?  Hospitalized since your last visit?no    2. Have you seen or consulted any other health care providers outside of the [facility] since your last visit?  Include any pap smears or colon screening. no

## 2024-03-18 ENCOUNTER — TELEPHONE (OUTPATIENT)
Age: 44
End: 2024-03-18

## 2024-03-18 ENCOUNTER — HOSPITAL ENCOUNTER (OUTPATIENT)
Facility: HOSPITAL | Age: 44
Setting detail: INFUSION SERIES
Discharge: HOME | End: 2024-03-18
Payer: COMMERCIAL

## 2024-03-18 VITALS
TEMPERATURE: 98 F | RESPIRATION RATE: 18 BRPM | HEART RATE: 90 BPM | SYSTOLIC BLOOD PRESSURE: 101 MMHG | DIASTOLIC BLOOD PRESSURE: 65 MMHG

## 2024-03-18 DIAGNOSIS — E86.0 DEHYDRATION: Primary | ICD-10-CM

## 2024-03-18 LAB
BASOPHILS # BLD: 0 K/UL (ref 0–0.1)
BASOPHILS NFR BLD: 0 % (ref 0–1)
DIFFERENTIAL METHOD BLD: ABNORMAL
EOSINOPHIL # BLD: 0.1 K/UL (ref 0–0.4)
EOSINOPHIL NFR BLD: 1 % (ref 0–7)
ERYTHROCYTE [DISTWIDTH] IN BLOOD BY AUTOMATED COUNT: 16.8 % (ref 11.5–14.5)
HCT VFR BLD AUTO: 33.1 % (ref 35–47)
HGB BLD-MCNC: 10.5 G/DL (ref 11.5–16)
IMM GRANULOCYTES # BLD AUTO: 0 K/UL (ref 0–0.04)
IMM GRANULOCYTES NFR BLD AUTO: 0 % (ref 0–0.5)
IRON SATN MFR SERPL: 10 % (ref 20–50)
IRON SERPL-MCNC: 31 UG/DL (ref 35–150)
LYMPHOCYTES # BLD: 1.3 K/UL (ref 0.8–3.5)
LYMPHOCYTES NFR BLD: 30 % (ref 12–49)
MCH RBC QN AUTO: 25.5 PG (ref 26–34)
MCHC RBC AUTO-ENTMCNC: 31.7 G/DL (ref 30–36.5)
MCV RBC AUTO: 80.5 FL (ref 80–99)
MONOCYTES # BLD: 0.5 K/UL (ref 0–1)
MONOCYTES NFR BLD: 12 % (ref 5–13)
NEUTS SEG # BLD: 2.5 K/UL (ref 1.8–8)
NEUTS SEG NFR BLD: 57 % (ref 32–75)
NRBC # BLD: 0 K/UL (ref 0–0.01)
NRBC BLD-RTO: 0 PER 100 WBC
PLATELET # BLD AUTO: 125 K/UL (ref 150–400)
RBC # BLD AUTO: 4.11 M/UL (ref 3.8–5.2)
TIBC SERPL-MCNC: 326 UG/DL (ref 250–450)
WBC # BLD AUTO: 4.5 K/UL (ref 3.6–11)

## 2024-03-18 PROCEDURE — 2500000003 HC RX 250 WO HCPCS: Performed by: SURGERY

## 2024-03-18 PROCEDURE — 2580000003 HC RX 258: Performed by: SURGERY

## 2024-03-18 PROCEDURE — 96365 THER/PROPH/DIAG IV INF INIT: CPT

## 2024-03-18 PROCEDURE — 83540 ASSAY OF IRON: CPT

## 2024-03-18 PROCEDURE — 6360000002 HC RX W HCPCS: Performed by: SURGERY

## 2024-03-18 PROCEDURE — 85025 COMPLETE CBC W/AUTO DIFF WBC: CPT

## 2024-03-18 PROCEDURE — 83550 IRON BINDING TEST: CPT

## 2024-03-18 PROCEDURE — 36415 COLL VENOUS BLD VENIPUNCTURE: CPT

## 2024-03-18 PROCEDURE — 96361 HYDRATE IV INFUSION ADD-ON: CPT

## 2024-03-18 RX ORDER — SODIUM CHLORIDE, SODIUM LACTATE, POTASSIUM CHLORIDE, AND CALCIUM CHLORIDE .6; .31; .03; .02 G/100ML; G/100ML; G/100ML; G/100ML
1000 INJECTION, SOLUTION INTRAVENOUS ONCE
Status: CANCELLED
Start: 2024-03-18 | End: 2024-03-18

## 2024-03-18 RX ORDER — SODIUM CHLORIDE, SODIUM LACTATE, POTASSIUM CHLORIDE, AND CALCIUM CHLORIDE .6; .31; .03; .02 G/100ML; G/100ML; G/100ML; G/100ML
1000 INJECTION, SOLUTION INTRAVENOUS ONCE
Status: COMPLETED | OUTPATIENT
Start: 2024-03-18 | End: 2024-03-18

## 2024-03-18 RX ADMIN — FOLIC ACID: 5 INJECTION, SOLUTION INTRAMUSCULAR; INTRAVENOUS; SUBCUTANEOUS at 14:44

## 2024-03-18 RX ADMIN — SODIUM CHLORIDE, SODIUM LACTATE, POTASSIUM CHLORIDE, AND CALCIUM CHLORIDE 1000 ML: 600; 310; 30; 20 INJECTION, SOLUTION INTRAVENOUS at 13:30

## 2024-03-18 ASSESSMENT — PAIN SCALES - GENERAL
PAINLEVEL_OUTOF10: 0
PAINLEVEL_OUTOF10: 0

## 2024-03-18 NOTE — TELEPHONE ENCOUNTER
----- Message from Marko Keyes MD sent at 3/18/2024  8:37 AM EDT -----  Regarding: RE: Psych appt  Contact: [phone]  Were you able to communicate with the psychiatrist office for an appointment?    ----- Message -----  From: Chicho Leonard LPN  Sent: 3/15/2024  10:08 AM EDT  To: Marko Keyes MD  Subject: FW: Psych appt                                     ----- Message -----  From: Sharri Pruitt  Sent: 3/15/2024  10:03 AM EDT  To: [facility] [address] Clinical Staff  Subject: Psych appt                                       Not if myself or others. Why so long to just feel better. The crying the emotions it’s overloading.

## 2024-03-18 NOTE — TELEPHONE ENCOUNTER
Danay states the patient says she is feeling anemic. Danay is asking we they can do an iron/blood panel.

## 2024-03-18 NOTE — TELEPHONE ENCOUNTER
I spoke with the doctor and informed him that \[facility] [place] [facility]\"" wanted to do a CBC and an Iron blood panel. He gave an okay for the order. I spoke with Danay and gave her a verbal order for the labs.

## 2024-03-18 NOTE — TELEPHONE ENCOUNTER
I called and left a message for Keyana earlier this morning. . I informed her that I faxed a referral and office note for the patient. Dr. Keyes would like to get her in to see Dr. Queen as soon as possible. I left my name and number for her to call me back. I called back and was transferred to Keyana's extension where I left another message.

## 2024-03-18 NOTE — PROGRESS NOTES
[facility] Peds/Adult Note                       Date: 2024    Name: Sharri Pruitt    MRN: [id number]         : 1980    1315 Patient arrives for IV Hydration without acute problems. Please see Epic for complete assessment and education provided.    Vital signs stable throughout and prior to discharge. Patient tolerated procedure well and was discharged without incident.  Patient is aware of no further [facility] appointments @ this time & to follow up with healthcare provider for next appointment.       Ms. Pruitt's vitals were reviewed prior to and after treatment.   Patient Vitals for the past 12 hrs:   Temp Pulse Resp BP   24 1552 98 °F (36.7 °C) 90 18 101/65   24 1315 97.6 °F (36.4 °C) 71 18 113/70         Lab results were obtained and reviewed.  Recent Results (from the past 12 hour(s))   CBC with Auto Differential    Collection Time: 24  2:42 PM   Result Value Ref Range    WBC 4.5 3.6 - 11.0 K/uL    RBC 4.11 3.80 - 5.20 M/uL    Hemoglobin 10.5 (L) 11.5 - 16.0 g/dL    Hematocrit 33.1 (L) 35.0 - 47.0 %    MCV 80.5 80.0 - 99.0 FL    MCH 25.5 (L) 26.0 - 34.0 PG    MCHC 31.7 30.0 - 36.5 g/dL    RDW 16.8 (H) 11.5 - 14.5 %    Platelets 125 (L) 150 - 400 K/uL    Nucleated RBCs 0.0 0  WBC    nRBC 0.00 0.00 - 0.01 K/uL    Neutrophils % 57 32 - 75 %    Lymphocytes % 30 12 - 49 %    Monocytes % 12 5 - 13 %    Eosinophils % 1 0 - 7 %    Basophils % 0 0 - 1 %    Immature Granulocytes 0 0.0 - 0.5 %    Neutrophils Absolute 2.5 1.8 - 8.0 K/UL    Lymphocytes Absolute 1.3 0.8 - 3.5 K/UL    Monocytes Absolute 0.5 0.0 - 1.0 K/UL    Eosinophils Absolute 0.1 0.0 - 0.4 K/UL    Basophils Absolute 0.0 0.0 - 0.1 K/UL    Absolute Immature Granulocyte 0.0 0.00 - 0.04 K/UL    Differential Type AUTOMATED     Iron and TIBC    Collection Time: 24  2:42 PM   Result Value Ref Range    Iron 31 (L) 35 - 150 ug/dL    TIBC 326 250 - 450 ug/dL    Iron % Saturation 10 (L) 20 - 50 %       Medications given:

## 2024-03-19 NOTE — TELEPHONE ENCOUNTER
I spoke with Keyana this morning. She gave me another fax number to fax the referral and office note. I informed her the patient is a bariatric patient. She currently has some things going on in her life and the surgeon feels it is affecting her nutritional state. He is trying to have her seen as soon as possible so that her health doesn't decline. She informed me that Dr. Queen is not accepting new patients at this time. I informed her the referral states that she can see someone else in the practice, which includes the first available. She acknowledged understanding.

## 2024-03-25 ENCOUNTER — TELEPHONE (OUTPATIENT)
Age: 44
End: 2024-03-25

## 2024-03-25 NOTE — TELEPHONE ENCOUNTER
Received a return call and the fax number I have is correct but I've been having difficulty with faxing the referral to that number.

## 2024-03-27 ENCOUNTER — OFFICE VISIT (OUTPATIENT)
Age: 44
End: 2024-03-27
Payer: COMMERCIAL

## 2024-03-27 VITALS
SYSTOLIC BLOOD PRESSURE: 97 MMHG | HEIGHT: 66 IN | HEART RATE: 107 BPM | RESPIRATION RATE: 20 BRPM | TEMPERATURE: 98.4 F | BODY MASS INDEX: 26.2 KG/M2 | WEIGHT: 163 LBS | DIASTOLIC BLOOD PRESSURE: 64 MMHG | OXYGEN SATURATION: 96 %

## 2024-03-27 DIAGNOSIS — R62.7 FAILURE TO THRIVE IN ADULT: ICD-10-CM

## 2024-03-27 DIAGNOSIS — D64.9 ANEMIA, UNSPECIFIED TYPE: Primary | ICD-10-CM

## 2024-03-27 PROCEDURE — 99213 OFFICE O/P EST LOW 20 MIN: CPT | Performed by: SURGERY

## 2024-03-27 ASSESSMENT — PATIENT HEALTH QUESTIONNAIRE - PHQ9
1. LITTLE INTEREST OR PLEASURE IN DOING THINGS: SEVERAL DAYS
2. FEELING DOWN, DEPRESSED OR HOPELESS: SEVERAL DAYS
SUM OF ALL RESPONSES TO PHQ QUESTIONS 1-9: 2
SUM OF ALL RESPONSES TO PHQ QUESTIONS 1-9: 2
SUM OF ALL RESPONSES TO PHQ9 QUESTIONS 1 & 2: 2
SUM OF ALL RESPONSES TO PHQ QUESTIONS 1-9: 2
SUM OF ALL RESPONSES TO PHQ QUESTIONS 1-9: 2

## 2024-03-27 NOTE — PROGRESS NOTES
Surgery Progress Note    3/27/2024    CC: Depression    Subjective:     Patient's depression has improved with the Zoloft.  She is awaiting her visit with psych in the next few weeks.  She is able to get a job lined up.  Now has a car.  Feeling much better in terms of emotional stability.  She is eating better.    Constitutional: No fever or chills  Neurologic: No headache  Eyes: No scleral icterus or irritated eyes  Nose: No nasal pain or drainage  Mouth: No oral lesions or sore throat  Cardiac: No palpations or chest pain  Pulmonary: No cough or shortness of breath  Gastrointestinal: No nausea, emesis, diarrhea, or constipation  Genitourinary: No dysuria  Musculoskeletal: No muscle or joint tenderness  Skin: No rashes or lesions  Psychiatric: No anxiety or depressed mood    Objective:     Vitals:    03/27/24 1144   BP: 97/64   Pulse: (!) 107   Resp: 20   Temp: 98.4 °F (36.9 °C)   SpO2: 96%     Body mass index is 26.31 kg/m².  Wt 163 lbs    General: No acute distress, conversant  Eyes: PERRLA, no scleral icterus  HENT: Normocephalic without oral lesions  Neck: Trachea midline without LAD  Cardiac: Normal pulse rate and rhythm  Pulmonary: Symmetric chest rise with normal effort  GI: Soft, NT, ND, no hernia, no splenomegaly  Skin: Warm without rash  Extremities: No edema or joint stiffness  Psych: Appropriate mood and affect    Assessment:     53-year-old female with likely undiagnosed depression versus bipolar responding to Zoloft in the interim    Plan:     Labs showed anemia.  We will get her over to hematology for infusions per the patient's request.  Continue Zoloft.  Follow-up with psychiatry in the next 2 weeks.  Will also likely need a therapist as well.  I will see her back in 1 month for virtual check-in.      Marko Keyes MD, FACS, [facility]  Bariatric and General Surgeon  [facility]

## 2024-03-30 ENCOUNTER — APPOINTMENT (OUTPATIENT)
Facility: HOSPITAL | Age: 44
End: 2024-03-30
Payer: COMMERCIAL

## 2024-03-30 ENCOUNTER — HOSPITAL ENCOUNTER (EMERGENCY)
Facility: HOSPITAL | Age: 44
Discharge: HOME | End: 2024-03-30
Payer: COMMERCIAL

## 2024-03-30 VITALS
HEART RATE: 64 BPM | WEIGHT: 162 LBS | TEMPERATURE: 97.7 F | SYSTOLIC BLOOD PRESSURE: 117 MMHG | BODY MASS INDEX: 26.03 KG/M2 | DIASTOLIC BLOOD PRESSURE: 72 MMHG | OXYGEN SATURATION: 100 % | RESPIRATION RATE: 15 BRPM | HEIGHT: 66 IN

## 2024-03-30 DIAGNOSIS — S61.211A LACERATION OF LEFT INDEX FINGER WITHOUT FOREIGN BODY WITHOUT DAMAGE TO NAIL, INITIAL ENCOUNTER: Primary | ICD-10-CM

## 2024-03-30 PROCEDURE — 12001 RPR S/N/AX/GEN/TRNK 2.5CM/<: CPT

## 2024-03-30 PROCEDURE — 99283 EMERGENCY DEPT VISIT LOW MDM: CPT

## 2024-03-30 PROCEDURE — 73140 X-RAY EXAM OF FINGER(S): CPT

## 2024-03-30 ASSESSMENT — PAIN SCALES - GENERAL: PAINLEVEL_OUTOF10: 10

## 2024-03-30 ASSESSMENT — PAIN - FUNCTIONAL ASSESSMENT: PAIN_FUNCTIONAL_ASSESSMENT: 0-10

## 2024-03-30 NOTE — ED PROVIDER NOTES
[facility] EMERGENCY DEPT  EMERGENCY DEPARTMENT HISTORY AND PHYSICAL EXAM      Date: 3/30/2024  Patient Name: Sharri Pruitt  MRN: [id number]  YOB: 1980  Date of evaluation: 3/30/2024  Provider: FERMIN Fabian NP   Note Started: 3:28 PM EDT 3/30/24    HISTORY OF PRESENT ILLNESS     Chief Complaint   Patient presents with    Laceration       History Provided By: Patient    HPI: Sharri Pruitt is a 43 y.o. female ***    PAST MEDICAL HISTORY   Past Medical History:  Past Medical History:   Diagnosis Date    Anemia     Anxiety     GERD (gastroesophageal reflux disease)     Hepatitis C     treated    History of blood transfusion     SEVERAL TRNASFUSIONS LAST ONE  NO REACTIONS TO ANY INFUSIONS    History of MRSA infection     Hx of abnormal Pap smear     Ill-defined condition     anemia hx, has received several blood transfusion, last blood transfusion May 5 2014    Intestinal perforation (HCC)     Psychiatric disorder     OCD    PUD (peptic ulcer disease)        Past Surgical History:  Past Surgical History:   Procedure Laterality Date    ESOPHAGEAL DILATATION      GASTRIC BYPASS SURGERY      GI  2009    perforated intestines    GYN       x 4    OTHER SURGICAL HISTORY  21    FEEDING TUBE  REMOVED 2021    LUCIANO-EN-Y GASTRIC BYPASS N/A 2023    ROBOTIC REVERSAL GASTRIC BYPASS, ESOPHAGOGASTRODUODENOSCOPY (ERAS), J-TUBE PLACEMENT AND PYLORAPLASTY performed by Marko Keyes MD at [facility] MAIN OR    UPPER GASTROINTESTINAL ENDOSCOPY N/A 10/02/2023    EGD ESOPHAGOGASTRODUODENOSCOPY performed by Marko Keyes MD at [facility] ENDOSCOPY    UPPER GASTROINTESTINAL ENDOSCOPY N/A 10/02/2023    EGD BIOPSY performed by Marko Keyes MD at [facility] ENDOSCOPY    UPPER GASTROINTESTINAL ENDOSCOPY  10/02/2023    EGD ESOPHAGOGASTRODUODENOSCOPY DILATATION performed by Marko Keyes MD at [facility] ENDOSCOPY    WISDOM TOOTH EXTRACTION         Family History:  Family History   Problem Relation Age of Onset    High

## 2024-04-02 ENCOUNTER — TELEPHONE (OUTPATIENT)
Age: 44
End: 2024-04-02

## 2024-04-04 DIAGNOSIS — F32.A DEPRESSION, UNSPECIFIED DEPRESSION TYPE: ICD-10-CM

## 2024-04-05 DIAGNOSIS — F32.A DEPRESSION, UNSPECIFIED DEPRESSION TYPE: ICD-10-CM

## 2024-04-05 RX ORDER — LORAZEPAM 1 MG/1
1 TABLET ORAL EVERY 8 HOURS PRN
Qty: 30 TABLET | Refills: 0 | OUTPATIENT
Start: 2024-04-05 | End: 2024-05-05

## 2024-04-05 NOTE — TELEPHONE ENCOUNTER
Patient called asking why her medication had not been filled yet. Patient asked if another physician or NP could approve her Lorazepam refill since Dr. Keyes is out.

## 2024-04-08 ENCOUNTER — PATIENT MESSAGE (OUTPATIENT)
Age: 44
End: 2024-04-08

## 2024-04-08 DIAGNOSIS — F32.A DEPRESSION, UNSPECIFIED DEPRESSION TYPE: ICD-10-CM

## 2024-04-08 NOTE — TELEPHONE ENCOUNTER
From: Sharri Pruitt  To: Dr. Marko Keyes  Sent: 4/8/2024 12:47 PM EDT  Subject: Me    Ativan please

## 2024-04-09 DIAGNOSIS — F41.9 ANXIETY: Primary | ICD-10-CM

## 2024-04-09 RX ORDER — LORAZEPAM 1 MG/1
1 TABLET ORAL EVERY 8 HOURS PRN
Qty: 30 TABLET | Refills: 0 | OUTPATIENT
Start: 2024-04-09 | End: 2024-05-09

## 2024-04-09 RX ORDER — LORAZEPAM 1 MG/1
1 TABLET ORAL EVERY 8 HOURS PRN
Qty: 10 TABLET | Refills: 0 | Status: CANCELLED | OUTPATIENT
Start: 2024-04-09 | End: 2024-05-09

## 2024-04-09 RX ORDER — LORAZEPAM 1 MG/1
1 TABLET ORAL EVERY 12 HOURS PRN
Qty: 15 TABLET | Refills: 0 | Status: SHIPPED | OUTPATIENT
Start: 2024-04-09 | End: 2024-05-09

## 2024-04-09 NOTE — TELEPHONE ENCOUNTER
The patient actually called me. She informed me her appointment is with an LCSW in Dr. Queen's office on Friday 4/12/2024 at 3 pm. I informed her that Dr. Keyes wanted to know the actual date of your appointment. She acknowledged understanding that he can not continue to prescribe the Lorazepam. I told her I will route this to him. Please check with your pharmacy a little later. She acknowledged understanding and thanked me for the call.

## 2024-04-18 DIAGNOSIS — F32.A DEPRESSION, UNSPECIFIED DEPRESSION TYPE: ICD-10-CM

## 2024-04-19 ENCOUNTER — TELEPHONE (OUTPATIENT)
Age: 44
End: 2024-04-19

## 2024-04-21 RX ORDER — LORAZEPAM 1 MG/1
1 TABLET ORAL EVERY 8 HOURS PRN
Qty: 30 TABLET | Refills: 0 | OUTPATIENT
Start: 2024-04-21 | End: 2024-05-21

## 2024-04-22 ENCOUNTER — TELEPHONE (OUTPATIENT)
Age: 44
End: 2024-04-22

## 2024-04-22 NOTE — TELEPHONE ENCOUNTER
Patient called requesting a call back from Dr. Keyes or his nurse. I asked if I could relay anything and patient stated no, that she just needs to speak with someone.

## 2024-04-23 ENCOUNTER — APPOINTMENT (OUTPATIENT)
Facility: HOSPITAL | Age: 44
End: 2024-04-23
Payer: COMMERCIAL

## 2024-04-23 ENCOUNTER — HOSPITAL ENCOUNTER (EMERGENCY)
Facility: HOSPITAL | Age: 44
Discharge: HOME | End: 2024-04-23
Attending: EMERGENCY MEDICINE
Payer: COMMERCIAL

## 2024-04-23 VITALS
HEIGHT: 66 IN | TEMPERATURE: 98.3 F | OXYGEN SATURATION: 100 % | RESPIRATION RATE: 20 BRPM | WEIGHT: 161 LBS | HEART RATE: 88 BPM | BODY MASS INDEX: 25.88 KG/M2 | DIASTOLIC BLOOD PRESSURE: 78 MMHG | SYSTOLIC BLOOD PRESSURE: 117 MMHG

## 2024-04-23 DIAGNOSIS — Z34.91 FIRST TRIMESTER PREGNANCY: Primary | ICD-10-CM

## 2024-04-23 LAB
ALBUMIN SERPL-MCNC: 4.3 G/DL (ref 3.5–5)
ALBUMIN/GLOB SERPL: 1 (ref 1.1–2.2)
ALP SERPL-CCNC: 71 U/L (ref 45–117)
ALT SERPL-CCNC: 36 U/L (ref 12–78)
ANION GAP SERPL CALC-SCNC: 5 MMOL/L (ref 5–15)
APPEARANCE UR: CLEAR
AST SERPL W P-5'-P-CCNC: 45 U/L (ref 15–37)
BACTERIA URNS QL MICRO: NEGATIVE /HPF
BASOPHILS # BLD: 0 K/UL (ref 0–0.1)
BASOPHILS NFR BLD: 0 % (ref 0–1)
BILIRUB SERPL-MCNC: 0.7 MG/DL (ref 0.2–1)
BILIRUB UR QL: NEGATIVE
BUN SERPL-MCNC: 14 MG/DL (ref 6–20)
BUN/CREAT SERPL: 18 (ref 12–20)
CA-I BLD-MCNC: 10.2 MG/DL (ref 8.5–10.1)
CHLORIDE SERPL-SCNC: 104 MMOL/L (ref 97–108)
CO2 SERPL-SCNC: 24 MMOL/L (ref 21–32)
COLOR UR: ABNORMAL
CREAT SERPL-MCNC: 0.78 MG/DL (ref 0.55–1.02)
DIFFERENTIAL METHOD BLD: ABNORMAL
EOSINOPHIL # BLD: 0.1 K/UL (ref 0–0.4)
EOSINOPHIL NFR BLD: 2 % (ref 0–7)
EPITH CASTS URNS QL MICRO: ABNORMAL /LPF
ERYTHROCYTE [DISTWIDTH] IN BLOOD BY AUTOMATED COUNT: 17.7 % (ref 11.5–14.5)
GLOBULIN SER CALC-MCNC: 4.5 G/DL (ref 2–4)
GLUCOSE SERPL-MCNC: 87 MG/DL (ref 65–100)
GLUCOSE UR STRIP.AUTO-MCNC: NEGATIVE MG/DL
HCG SERPL-ACNC: ABNORMAL MIU/ML (ref 0–6)
HCG UR QL: POSITIVE
HCG UR QL: POSITIVE
HCT VFR BLD AUTO: 36.1 % (ref 35–47)
HGB BLD-MCNC: 11.5 G/DL (ref 11.5–16)
HGB UR QL STRIP: ABNORMAL
IMM GRANULOCYTES # BLD AUTO: 0 K/UL (ref 0–0.04)
IMM GRANULOCYTES NFR BLD AUTO: 0 % (ref 0–0.5)
KETONES UR QL STRIP.AUTO: 5 MG/DL
LEUKOCYTE ESTERASE UR QL STRIP.AUTO: NEGATIVE
LIPASE SERPL-CCNC: 37 U/L (ref 13–75)
LYMPHOCYTES # BLD: 2.2 K/UL (ref 0.8–3.5)
LYMPHOCYTES NFR BLD: 35 % (ref 12–49)
MCH RBC QN AUTO: 25.1 PG (ref 26–34)
MCHC RBC AUTO-ENTMCNC: 31.9 G/DL (ref 30–36.5)
MCV RBC AUTO: 78.8 FL (ref 80–99)
MONOCYTES # BLD: 0.7 K/UL (ref 0–1)
MONOCYTES NFR BLD: 11 % (ref 5–13)
MUCOUS THREADS URNS QL MICRO: ABNORMAL /LPF
NEUTS SEG # BLD: 3.3 K/UL (ref 1.8–8)
NEUTS SEG NFR BLD: 52 % (ref 32–75)
NITRITE UR QL STRIP.AUTO: NEGATIVE
NRBC # BLD: 0 K/UL (ref 0–0.01)
NRBC BLD-RTO: 0 PER 100 WBC
PH UR STRIP: 6 (ref 5–8)
PLATELET # BLD AUTO: 145 K/UL (ref 150–400)
PMV BLD AUTO: 12.7 FL (ref 8.9–12.9)
POTASSIUM SERPL-SCNC: 3.5 MMOL/L (ref 3.5–5.1)
PROT SERPL-MCNC: 8.8 G/DL (ref 6.4–8.2)
PROT UR STRIP-MCNC: 30 MG/DL
RBC # BLD AUTO: 4.58 M/UL (ref 3.8–5.2)
RBC #/AREA URNS HPF: ABNORMAL /HPF (ref 0–5)
RBC MORPH BLD: ABNORMAL
SODIUM SERPL-SCNC: 133 MMOL/L (ref 136–145)
SP GR UR REFRACTOMETRY: >1.03 (ref 1–1.03)
URINE CULTURE IF INDICATED: ABNORMAL
UROBILINOGEN UR QL STRIP.AUTO: 4 EU/DL (ref 0.1–1)
WBC # BLD AUTO: 6.3 K/UL (ref 3.6–11)
WBC URNS QL MICRO: ABNORMAL /HPF (ref 0–4)

## 2024-04-23 PROCEDURE — 96374 THER/PROPH/DIAG INJ IV PUSH: CPT

## 2024-04-23 PROCEDURE — 81001 URINALYSIS AUTO W/SCOPE: CPT

## 2024-04-23 PROCEDURE — 36415 COLL VENOUS BLD VENIPUNCTURE: CPT

## 2024-04-23 PROCEDURE — 93005 ELECTROCARDIOGRAM TRACING: CPT | Performed by: EMERGENCY MEDICINE

## 2024-04-23 PROCEDURE — 83690 ASSAY OF LIPASE: CPT

## 2024-04-23 PROCEDURE — 99284 EMERGENCY DEPT VISIT MOD MDM: CPT

## 2024-04-23 PROCEDURE — 85025 COMPLETE CBC W/AUTO DIFF WBC: CPT

## 2024-04-23 PROCEDURE — 6370000000 HC RX 637 (ALT 250 FOR IP): Performed by: EMERGENCY MEDICINE

## 2024-04-23 PROCEDURE — 6360000002 HC RX W HCPCS: Performed by: EMERGENCY MEDICINE

## 2024-04-23 PROCEDURE — 2580000003 HC RX 258: Performed by: EMERGENCY MEDICINE

## 2024-04-23 PROCEDURE — 81025 URINE PREGNANCY TEST: CPT

## 2024-04-23 PROCEDURE — 84702 CHORIONIC GONADOTROPIN TEST: CPT

## 2024-04-23 PROCEDURE — 80053 COMPREHEN METABOLIC PANEL: CPT

## 2024-04-23 RX ORDER — LORAZEPAM 0.5 MG/1
0.5 TABLET ORAL EVERY 4 HOURS PRN
Status: DISCONTINUED | OUTPATIENT
Start: 2024-04-23 | End: 2024-04-24 | Stop reason: HOSPADM

## 2024-04-23 RX ORDER — 0.9 % SODIUM CHLORIDE 0.9 %
1000 INTRAVENOUS SOLUTION INTRAVENOUS ONCE
Status: COMPLETED | OUTPATIENT
Start: 2024-04-23 | End: 2024-04-23

## 2024-04-23 RX ORDER — ONDANSETRON 2 MG/ML
4 INJECTION INTRAMUSCULAR; INTRAVENOUS ONCE
Status: COMPLETED | OUTPATIENT
Start: 2024-04-23 | End: 2024-04-23

## 2024-04-23 RX ADMIN — ONDANSETRON 4 MG: 2 INJECTION INTRAMUSCULAR; INTRAVENOUS at 22:17

## 2024-04-23 RX ADMIN — LORAZEPAM 0.5 MG: 0.5 TABLET ORAL at 23:42

## 2024-04-23 RX ADMIN — SODIUM CHLORIDE 1000 ML: 9 INJECTION, SOLUTION INTRAVENOUS at 22:14

## 2024-04-23 ASSESSMENT — PAIN - FUNCTIONAL ASSESSMENT
PAIN_FUNCTIONAL_ASSESSMENT: NONE - DENIES PAIN
PAIN_FUNCTIONAL_ASSESSMENT: NONE - DENIES PAIN

## 2024-04-23 ASSESSMENT — LIFESTYLE VARIABLES: HOW OFTEN DO YOU HAVE A DRINK CONTAINING ALCOHOL: NEVER

## 2024-04-24 LAB
EKG ATRIAL RATE: 64 BPM
EKG DIAGNOSIS: NORMAL
EKG P AXIS: 80 DEGREES
EKG P-R INTERVAL: 198 MS
EKG Q-T INTERVAL: 398 MS
EKG QRS DURATION: 90 MS
EKG QTC CALCULATION (BAZETT): 410 MS
EKG R AXIS: 80 DEGREES
EKG T AXIS: 71 DEGREES
EKG VENTRICULAR RATE: 64 BPM

## 2024-04-24 NOTE — ED PROVIDER NOTES
[facility] EMERGENCY DEPT  EMERGENCY DEPARTMENT HISTORY AND PHYSICAL EXAM      Date: 2024  Patient Name: Sharri Pruitt  MRN: [id number]  YOB: 1980  Date of evaluation: 2024  Provider: Guillermina Kimball MD   Note Started: 9:50 PM EDT 24    HISTORY OF PRESENT ILLNESS     Chief Complaint   Patient presents with    Dehydration       History Provided By: Patient    HPI: Sharri Pruitt is a 43 y.o. female with history of gastric bypass presenting with nausea, vomiting, decreased appetite.  This has been over the past 4 days.  Patient states she had a reversal of her gastric bypass in December and was worried this was related to this.  No abdominal pain.    Patient believes she has been pregnant 10 times.  She has 4 living children.  She has had miscarriages and abortions including a miscarriage of a twin pregnancy.  She was sexually active with 1 partner.  No concern for STDs.  States she recently saw her OB/GYN and had a negative pregnancy test.    PAST MEDICAL HISTORY   Past Medical History:  Past Medical History:   Diagnosis Date    Anemia     Anxiety     GERD (gastroesophageal reflux disease)     Hepatitis C     treated    History of blood transfusion     SEVERAL TRNASFUSIONS LAST ONE  NO REACTIONS TO ANY INFUSIONS    History of MRSA infection     Hx of abnormal Pap smear     Ill-defined condition     anemia hx, has received several blood transfusion, last blood transfusion May 5 2014    Intestinal perforation (HCC)     Psychiatric disorder     OCD    PUD (peptic ulcer disease)        Past Surgical History:  Past Surgical History:   Procedure Laterality Date    ESOPHAGEAL DILATATION      GASTRIC BYPASS SURGERY      GI  2009    perforated intestines    GYN       x 4    OTHER SURGICAL HISTORY  21    FEEDING TUBE  REMOVED 2021    LUCIANO-EN-Y GASTRIC BYPASS N/A 2023    ROBOTIC REVERSAL GASTRIC BYPASS, ESOPHAGOGASTRODUODENOSCOPY (ERAS), J-TUBE PLACEMENT AND

## 2024-04-24 NOTE — ED TRIAGE NOTES
Pt had gastic bypass reversal in December. Pt had gtube removed in march. Pt states she is unable to keep food for about a day.

## 2024-04-24 NOTE — TELEPHONE ENCOUNTER
I called and spoke with the patient. I asked her which medication are you referring to and she stated, \"Lorazepam!\" I informed her that he will not refill the script and she asked, \"Why?\" I told her because he can not continue to prescribe psychotropic medications, your PCP or the Psychologist will have to continue to prescribe that medication. She stated, \"My Therapist will not refer me to a Psychiatrist within the practice. She keeps continuing with therapy session. I need the number for Dr. Queen's office. I gave her the number to his office. She acknowledged understanding and thanked me for the call.

## 2024-05-01 ENCOUNTER — PATIENT MESSAGE (OUTPATIENT)
Age: 44
End: 2024-05-01

## 2024-05-01 ENCOUNTER — OFFICE VISIT (OUTPATIENT)
Age: 44
End: 2024-05-01
Payer: COMMERCIAL

## 2024-05-01 VITALS
DIASTOLIC BLOOD PRESSURE: 53 MMHG | WEIGHT: 156 LBS | SYSTOLIC BLOOD PRESSURE: 110 MMHG | HEART RATE: 62 BPM | BODY MASS INDEX: 25.18 KG/M2

## 2024-05-01 DIAGNOSIS — Z32.01 POSITIVE PREGNANCY TEST: Primary | ICD-10-CM

## 2024-05-01 LAB
HCG, PREGNANCY, URINE, POC: POSITIVE
VALID INTERNAL CONTROL, POC: YES

## 2024-05-01 PROCEDURE — 81025 URINE PREGNANCY TEST: CPT | Performed by: STUDENT IN AN ORGANIZED HEALTH CARE EDUCATION/TRAINING PROGRAM

## 2024-05-01 PROCEDURE — 99212 OFFICE O/P EST SF 10 MIN: CPT | Performed by: STUDENT IN AN ORGANIZED HEALTH CARE EDUCATION/TRAINING PROGRAM

## 2024-05-01 NOTE — PROGRESS NOTES
Sharri Pruitt is a 43 y.o. female presents for a problem visit.    Chief Complaint   Patient presents with    pregnancy confirmation      Patient's last menstrual period was 11/10/2023.  Birth Control: none.  Last Pap: ascus, neg hpv obtained 2 month(s) ago.    The patient is here to confirm pregnancy.  The patient went to the ER 6 days ago c/o nausea, vomiting, decreased appetite.  She had a reversal of gastric bypass in December and was worried this was related.   UPT positive in the ER.   UPT positive here in the office.  LMP 3/2/24 making her 8w2d  Denies having any bleeding or cramping.     The ultrasound today indicated:  TA ULTRASOUND PERFORMED A SINGLE VIABLE 8W0D IUP IS SEEN WITH NORMAL CARDIAC RHYTHM. GESTATIONAL AGE BASED ON TODAY'S ULTRASOUND. A NORMAL YOLK SAC IS SEEN. RIGHT ADNEXA APPEARS WITHIN NORMAL LIMITS. LEFT ADNEXA APPEARS WITHIN NORMAL LIMITS. NO FREE FLUID SEEN IN THE CDS.         1. Have you been to the ER, urgent care clinic, or hospitalized since your last visit? Yes    2. Have you seen or consulted any other health care providers outside of the [facility] since your last visit? No    Examination chaperoned by Su Antoine MA.  
c/w US today with SLIUP.  Discussed risks of pregnancy when AMA, including increased risk of aneuploidy, HTN, GDM, PTD, miscarriage. Patient going to return in 1w for new ob. Encouraged PNV with folic acid.   RTO prn if symptoms persist or worsen.  Instructions given to pt.  Handouts given to pt.    MD Riaz Ratliff OB/[facility]

## 2024-05-03 ENCOUNTER — OFFICE VISIT (OUTPATIENT)
Age: 44
End: 2024-05-03
Payer: COMMERCIAL

## 2024-05-03 VITALS
TEMPERATURE: 98.4 F | OXYGEN SATURATION: 98 % | RESPIRATION RATE: 15 BRPM | BODY MASS INDEX: 25.36 KG/M2 | SYSTOLIC BLOOD PRESSURE: 110 MMHG | DIASTOLIC BLOOD PRESSURE: 72 MMHG | HEART RATE: 75 BPM | HEIGHT: 66 IN | WEIGHT: 157.8 LBS

## 2024-05-03 DIAGNOSIS — E46 MALNUTRITION, UNSPECIFIED TYPE (HCC): Primary | ICD-10-CM

## 2024-05-03 DIAGNOSIS — R63.4 WEIGHT LOSS: ICD-10-CM

## 2024-05-03 PROCEDURE — 99214 OFFICE O/P EST MOD 30 MIN: CPT | Performed by: SURGERY

## 2024-05-03 ASSESSMENT — PATIENT HEALTH QUESTIONNAIRE - PHQ9
SUM OF ALL RESPONSES TO PHQ QUESTIONS 1-9: 10
4. FEELING TIRED OR HAVING LITTLE ENERGY: NEARLY EVERY DAY
8. MOVING OR SPEAKING SO SLOWLY THAT OTHER PEOPLE COULD HAVE NOTICED. OR THE OPPOSITE, BEING SO FIGETY OR RESTLESS THAT YOU HAVE BEEN MOVING AROUND A LOT MORE THAN USUAL: NOT AT ALL
3. TROUBLE FALLING OR STAYING ASLEEP: NOT AT ALL
SUM OF ALL RESPONSES TO PHQ QUESTIONS 1-9: 10
2. FEELING DOWN, DEPRESSED OR HOPELESS: NEARLY EVERY DAY
9. THOUGHTS THAT YOU WOULD BE BETTER OFF DEAD, OR OF HURTING YOURSELF: NOT AT ALL
SUM OF ALL RESPONSES TO PHQ9 QUESTIONS 1 & 2: 4
1. LITTLE INTEREST OR PLEASURE IN DOING THINGS: SEVERAL DAYS
5. POOR APPETITE OR OVEREATING: NEARLY EVERY DAY
7. TROUBLE CONCENTRATING ON THINGS, SUCH AS READING THE NEWSPAPER OR WATCHING TELEVISION: NOT AT ALL
SUM OF ALL RESPONSES TO PHQ QUESTIONS 1-9: 10
SUM OF ALL RESPONSES TO PHQ QUESTIONS 1-9: 10
10. IF YOU CHECKED OFF ANY PROBLEMS, HOW DIFFICULT HAVE THESE PROBLEMS MADE IT FOR YOU TO DO YOUR WORK, TAKE CARE OF THINGS AT HOME, OR GET ALONG WITH OTHER PEOPLE: VERY DIFFICULT
6. FEELING BAD ABOUT YOURSELF - OR THAT YOU ARE A FAILURE OR HAVE LET YOURSELF OR YOUR FAMILY DOWN: NOT AT ALL

## 2024-05-03 NOTE — PROGRESS NOTES
Surgery Progress Note    5/3/2024    CC: Poor appetite    Subjective:     I have been following this patient extensively over the last few months since her bypass reversal.  Her feeding tube came out a few months ago.  She has been seeing psychology and she reports that her mental state has improved.  She has lost weight recently and she reports not having the desire to eat.  She reports she can keep food down.  Difficulty with her new job and will likely change jobs soon.  She was told today that she is 8 weeks pregnant.  She is concerned regarding her nutrition level for the baby.  She is having intermittent nausea which makes food not appetizing to her.    Constitutional: No fever or chills  Neurologic: No headache  Eyes: No scleral icterus or irritated eyes  Nose: No nasal pain or drainage  Mouth: No oral lesions or sore throat  Cardiac: No palpations or chest pain  Pulmonary: No cough or shortness of breath  Gastrointestinal: No nausea, emesis, diarrhea, or constipation  Genitourinary: No dysuria  Musculoskeletal: No muscle or joint tenderness  Skin: No rashes or lesions  Psychiatric: No anxiety or depressed mood    Objective:     Vitals:    05/03/24 1152   BP: 110/72   Pulse: 75   Resp: 15   Temp: 98.4 °F (36.9 °C)   SpO2: 98%     Body mass index is 25.47 kg/m².  Wt 157 lbs    General: No acute distress, conversant  Eyes: PERRLA, no scleral icterus  HENT: Normocephalic without oral lesions  Neck: Trachea midline without LAD  Cardiac: Normal pulse rate and rhythm  Pulmonary: Symmetric chest rise with normal effort  GI: Soft, NT, ND, no hernia, no splenomegaly  Skin: Warm without rash  Extremities: No edema or joint stiffness  Psych: Appropriate mood and affect    Assessment:     43-year-old female with history of gastric bypass and reversal now with new pregnancy here for nutritional counseling    Plan:     Recommend any variety of protein shakes and solid food as tolerated.  What ever she wants to try.  I want

## 2024-05-03 NOTE — PROGRESS NOTES
Identified patient with two patient identifiers (name and ). Reviewed chart in preparation for visit and have obtained necessary documentation.    Sharri Pruitt is a 43 y.o. female  Chief Complaint   Patient presents with    Follow-up     Discuss feeding tube      /72 (Site: Right Upper Arm, Position: Sitting, Cuff Size: Small Adult)   Pulse 75   Temp 98.4 °F (36.9 °C) (Oral)   Resp 15   Ht 1.676 m (5' 6\")   Wt 71.6 kg (157 lb 12.8 oz)   LMP 11/10/2023   SpO2 98%   BMI 25.47 kg/m²     1. Have you been to the ER, urgent care clinic since your last visit?  Hospitalized since your last visit?Yes [facility] ED    2. Have you seen or consulted any other health care providers outside of the [facility] since your last visit?  Include any pap smears or colon screening. no

## 2024-05-08 ENCOUNTER — TELEPHONE (OUTPATIENT)
Age: 44
End: 2024-05-08

## 2024-05-08 ENCOUNTER — ROUTINE PRENATAL (OUTPATIENT)
Age: 44
End: 2024-05-08

## 2024-05-08 VITALS
WEIGHT: 157 LBS | HEART RATE: 86 BPM | BODY MASS INDEX: 25.34 KG/M2 | DIASTOLIC BLOOD PRESSURE: 66 MMHG | SYSTOLIC BLOOD PRESSURE: 115 MMHG

## 2024-05-08 DIAGNOSIS — Z3A.09 9 WEEKS GESTATION OF PREGNANCY: Primary | ICD-10-CM

## 2024-05-08 LAB
C. TRACHOMATIS, EXTERNAL RESULT: NEGATIVE
HEP B, EXTERNAL RESULT: NEGATIVE
HEPATITIS C ANTIBODY, EXTERNAL RESULT: NON REACTIVE
HIV, EXTERNAL RESULT: NON REACTIVE
N. GONORRHOEAE, EXTERNAL RESULT: NEGATIVE
RPR, EXTERNAL RESULT: NON REACTIVE
RUBELLA TITER, EXTERNAL RESULT: NORMAL

## 2024-05-08 PROCEDURE — 0500F INITIAL PRENATAL CARE VISIT: CPT | Performed by: STUDENT IN AN ORGANIZED HEALTH CARE EDUCATION/TRAINING PROGRAM

## 2024-05-08 NOTE — PROGRESS NOTES
Sharri Pruitt is a 43 y.o. female presents for a new pregnancy visit.    Chief Complaint   Patient presents with    Initial Prenatal Visit       Problems:  initial prenatal visit      Patient's last menstrual period was 11/10/2023.    Last Pap: ascus, neg hpv obtained 2 month(s) ago.    LMP history:  The date of her LMP is not certain.  Her last menstrual period was normal and lasted for 4 to 5 days. She was not on the pill at conception.     Based on her LMP, her EDC is 24 and her EGA is 9 weeks,4 days.      Ultrasound data:  She had an ultrasound done by the ultrasound tech 1 week ago which revealed a viable morales pregnancy with a gestational age of 8 weeks and 0 days giving an EDC of 24.    TA ULTRASOUND PERFORMED  A SINGLE VIABLE 8W0D IUP IS SEEN WITH NORMAL CARDIAC RHYTHM.  GESTATIONAL AGE BASED ON TODAY'S ULTRASOUND.   A NORMAL YOLK SAC IS SEEN.  RIGHT ADNEXA APPEARS WITHIN NORMAL LIMITS.  LEFT ADNEXA APPEARS WITHIN NORMAL LIMITS  NO FREE FLUID SEEN IN THE CDS.     Pregnancy symptoms:    Since her LMP she has experienced urinary frequency, breast tenderness, and nausea.   She has been vomiting over the last few weeks.  Associated signs and symptoms which she denies: dysuria, discharge, vaginal bleeding.      Relevant past pregnancy history:  She has the following pregnancy history: none   She has no history of  delivery.    Relevant past medical history:(relevant to this pregnancy): noncontributory.      Her occupation is: [employer] at [employer], works 12 hours.        Examination chaperoned by Su Antoine MA.  
to person, place and time  Mood and Affect: mood normal, affect appropriate    Assessment:   Intrauterine pregnancy with the following problems identified:   Advanced maternal age - will be 43yo at time of delivery. MFM referral placed. Plans to have NIPT, not interested in amniocentesis. No h/o HTN, DM. Will rx ASA for preE ppx at 12w.  H/o lenny en y s/p reversal 2023 - nutrition labs collected today. Reversed due to \"I was not gaining weight.\"  H/o hep C, acute, resolved - CMP to assess liver function tests today. Hep C Ab with reflex to quant viral load collected.    Plan:     Offered CF testing, CVS, Nuchal Translucency, MSAFP, amnio, and discussed NIPT  Course of pregnancy discussed including visit schedule, routine U/S, glucola testing, etc.  Avoid alcoholic beverages and illicit/recreational drugs use  Take prenatal vitamins or folic acid daily.  Hospital and practice style discussed with coverage system.  Discussed nutrition, toxoplasmosis precautions, sexual activity, exercise, need for influenza vaccine, environmental and work hazards, travel advice, screen for domestic violence, need for seat belts.  Discussed seafood, unpasteurized dairy products, deli meat, artificial sweeteners, and caffeine.  Information on prenatal classes/breastfeeding given.  Information on circumcision given  Patient encouraged not to smoke.  Discussed current prescription drug use. Given medication list.  Discussed the use of over the counter medications and chemicals.  Route of delivery discussed, including risks, benefits, and alternatives of  versus repeat LTCS.  Pt understands risk of hemorrhage during pregnancy and post delivery and would accept blood products if necessary in life-threatening emergencies    Handouts given to pt.    MD Riaz Ratliff OB/[facility]

## 2024-05-08 NOTE — TELEPHONE ENCOUNTER
PT name and  verified    44 yo last ov 24, appt today 24  LMP c/w us 24-8w2d, today making 9w2d      PT calling and verifying appt for today, asking if she needed to come as she was just seen 24, confirmation of pregnancy.  Relayed, yes, as her last ov was a problem/ED fu ov, went to ED for n/v and confirmation of pregnancy.  PT verbalizes understanding.

## 2024-05-09 LAB — BLD GP AB SCN TITR SERPL: NORMAL {TITER}

## 2024-05-10 LAB
25(OH)D3 SERPL-MCNC: 30.8 NG/ML (ref 30–100)
ABO + RH BLD: NORMAL
ALBUMIN SERPL-MCNC: 3.6 G/DL (ref 3.5–5)
ALBUMIN/GLOB SERPL: 0.9 (ref 1.1–2.2)
ALP SERPL-CCNC: 62 U/L (ref 45–117)
ALT SERPL-CCNC: 21 U/L (ref 12–78)
ANION GAP SERPL CALC-SCNC: 4 MMOL/L (ref 5–15)
AST SERPL-CCNC: 18 U/L (ref 15–37)
BACTERIA SPEC CULT: NORMAL
BILIRUB SERPL-MCNC: 0.6 MG/DL (ref 0.2–1)
BLOOD BANK CMNT PATIENT-IMP: NORMAL
BLOOD GROUP ANTIBODIES SERPL: NORMAL
BLOOD GROUP ANTIBODIES SERPL: NORMAL
BUN SERPL-MCNC: 7 MG/DL (ref 6–20)
BUN/CREAT SERPL: 11 (ref 12–20)
C TRACH RRNA SPEC QL NAA+PROBE: NEGATIVE
CALCIUM SERPL-MCNC: 9.8 MG/DL (ref 8.5–10.1)
CALCIUM SERPL-MCNC: 9.9 MG/DL (ref 8.5–10.1)
CC UR VC: NORMAL
CHLORIDE SERPL-SCNC: 106 MMOL/L (ref 97–108)
CO2 SERPL-SCNC: 25 MMOL/L (ref 21–32)
CREAT SERPL-MCNC: 0.64 MG/DL (ref 0.55–1.02)
ERYTHROCYTE [DISTWIDTH] IN BLOOD BY AUTOMATED COUNT: 19.2 % (ref 11.5–14.5)
FOLATE SERPL-MCNC: 8.2 NG/ML (ref 5–21)
GLOBULIN SER CALC-MCNC: 3.8 G/DL (ref 2–4)
GLUCOSE SERPL-MCNC: 92 MG/DL (ref 65–100)
HBV SURFACE AG SER QL: 0.14 INDEX
HBV SURFACE AG SER QL: NEGATIVE
HCT VFR BLD AUTO: 34.7 % (ref 35–47)
HCV AB SERPL QL IA: NORMAL
HCV IGG SERPL QL IA: NON REACTIVE S/CO RATIO
HGB BLD-MCNC: 11.3 G/DL (ref 11.5–16)
HIV 1+2 AB+HIV1 P24 AG SERPL QL IA: NONREACTIVE
HIV 1/2 RESULT COMMENT: NORMAL
IRON SERPL-MCNC: 63 UG/DL (ref 35–150)
MCH RBC QN AUTO: 26.4 PG (ref 26–34)
MCHC RBC AUTO-ENTMCNC: 32.6 G/DL (ref 30–36.5)
MCV RBC AUTO: 81.1 FL (ref 80–99)
N GONORRHOEA RRNA SPEC QL NAA+PROBE: NEGATIVE
NRBC # BLD: 0 K/UL (ref 0–0.01)
NRBC BLD-RTO: 0 PER 100 WBC
PLATELET # BLD AUTO: 160 K/UL (ref 150–400)
PMV BLD AUTO: 13 FL (ref 8.9–12.9)
POTASSIUM SERPL-SCNC: 3.6 MMOL/L (ref 3.5–5.1)
PROT SERPL-MCNC: 7.4 G/DL (ref 6.4–8.2)
RBC # BLD AUTO: 4.28 M/UL (ref 3.8–5.2)
RUBV IGG SERPL IA-ACNC: NORMAL IU/ML
SERVICE CMNT-IMP: NORMAL
SODIUM SERPL-SCNC: 135 MMOL/L (ref 136–145)
SPECIMEN EXP DATE BLD: NORMAL
SPECIMEN STATUS REPORT: NORMAL
T VAGINALIS RRNA SPEC QL NAA+PROBE: NEGATIVE
VIT B12 SERPL-MCNC: 851 PG/ML (ref 193–986)
WBC # BLD AUTO: 5.1 K/UL (ref 3.6–11)

## 2024-05-11 LAB — VZV IGG SER IA-ACNC: 794 INDEX

## 2024-05-12 LAB — T PALLIDUM AB SER QL IA: NON REACTIVE

## 2024-05-14 LAB
HGB A MFR BLD: 97.3 % (ref 96.4–98.8)
HGB A2 MFR BLD COLUMN CHROM: 2.7 % (ref 1.8–3.2)
HGB F MFR BLD: 0 % (ref 0–2)
HGB FRACT BLD-IMP: NORMAL
HGB S MFR BLD: 0 %

## 2024-05-28 ENCOUNTER — TELEPHONE (OUTPATIENT)
Age: 44
End: 2024-05-28

## 2024-05-28 NOTE — TELEPHONE ENCOUNTER
Left vm advising appt scheduled for 5/29/2024 at 8am was cancelled and that [facility] would reach out to schedule appt.

## 2024-05-28 NOTE — TELEPHONE ENCOUNTER
Called patient to set up new patient appt. No answer. Left vm    Please schedule 6/5 with Linette        Also let patient know that Linette will be reviewing her chart and well reach out if needed before then.

## 2024-06-04 ENCOUNTER — ROUTINE PRENATAL (OUTPATIENT)
Age: 44
End: 2024-06-04
Payer: COMMERCIAL

## 2024-06-04 ENCOUNTER — TELEMEDICINE (OUTPATIENT)
Age: 44
End: 2024-06-04

## 2024-06-04 VITALS — SYSTOLIC BLOOD PRESSURE: 113 MMHG | DIASTOLIC BLOOD PRESSURE: 64 MMHG | HEART RATE: 65 BPM

## 2024-06-04 DIAGNOSIS — O09.521 SUPERVISION OF ELDERLY MULTIGRAVIDA IN FIRST TRIMESTER: Primary | ICD-10-CM

## 2024-06-04 DIAGNOSIS — Z3A.12 12 WEEKS GESTATION OF PREGNANCY: ICD-10-CM

## 2024-06-04 DIAGNOSIS — Z3A.12 12 WEEKS GESTATION OF PREGNANCY: Primary | ICD-10-CM

## 2024-06-04 PROCEDURE — 99203 OFFICE O/P NEW LOW 30 MIN: CPT | Performed by: OBSTETRICS & GYNECOLOGY

## 2024-06-04 PROCEDURE — 76813 OB US NUCHAL MEAS 1 GEST: CPT | Performed by: OBSTETRICS & GYNECOLOGY

## 2024-06-04 NOTE — PROGRESS NOTES
The patient, Sharri Pruitt, logged into the call to let me know that she would like to reschedule today's appointment. She stated that she is planning to attend her MFM ultrasound this afternoon (appt at 1 pm) but that she does not want to do the virtual genetic counseling portion at this scheduled time. I assured patient that this is not a problem and we can get her rescheduled when she is here later this afternoon. Also informed patient that genetic counseling appointment is completely optional should she decide to decline it entirely.    The patient verbalized her understanding of the information as it was presented to her today; she denies any further questions or concerns at this time.    Trish Shoemaker, MS, [facility]  Licensed, Certified Genetic Counselor

## 2024-06-04 NOTE — PROCEDURES
She has also had multiple fathers of her pregnancies. At  this time she has not had any genetic counseling but may be open to the idea. She has not had any genetic testing.    Today's ultrasound reveals a normal appearing first trimester fetus. The NT measurement is 1.05 mm and a nasal bone is visible.    Regarding maternal age,    With respect to the advanced maternal age, in addition to the increased risk for fetal chromosomal aneuploidies, patients are at high risk for developing diabetes mellitus,  early onset preeclampsia, premature labor and delivery, placental insufficiency, and intrauterine fetal demise. The relative risk for these complications ranges from 2-3 fold at  age 35. These relative risks increase further in women 40 years old or older. The risk of intrauterine fetal demise appears to be increased especially after 37 weeks  gestation.    Because of this patient's GI issues and history a small infant she may be at increased risk of fetal growth restriction.    Plan:  MFM follow up at 20 weeks for fetal anatomy  Offer NIPT  Serial assessment of fetal growth   testing starting at 32-34 weeks  Delivery at 39 weeks    30 minutes spent in consultation.    Recommendations  ==============    Recommend fetal anatomy at 20 weeks.    Coding  ======    Code: 63111  Description: Ultrasound, pregnant uterus, real time with image documentation, first trimester fetal nuchal translucency measurement, transabdominal or transvaginal  approach; single or first gestation

## 2024-06-04 NOTE — PATIENT INSTRUCTIONS
more about \"Weeks 10 to 14 of Your Pregnancy: Care Instructions.\"  Current as of: July 10, 2023               Content Version: 14.0  © 8449-5581 [employer].   Care instructions adapted under license by [employer]. If you have questions about a medical condition or this instruction, always ask your healthcare professional. [employer] disclaims any warranty or liability for your use of this information.

## 2024-06-05 ENCOUNTER — TELEPHONE (OUTPATIENT)
Age: 44
End: 2024-06-05

## 2024-06-05 ENCOUNTER — ROUTINE PRENATAL (OUTPATIENT)
Age: 44
End: 2024-06-05

## 2024-06-05 VITALS — DIASTOLIC BLOOD PRESSURE: 71 MMHG | BODY MASS INDEX: 24.86 KG/M2 | WEIGHT: 154 LBS | SYSTOLIC BLOOD PRESSURE: 112 MMHG

## 2024-06-05 DIAGNOSIS — O36.1110: ICD-10-CM

## 2024-06-05 DIAGNOSIS — N89.8 VAGINAL ITCHING: ICD-10-CM

## 2024-06-05 DIAGNOSIS — Z36.9 ENCOUNTER FOR ANTENATAL SCREENING OF MOTHER: ICD-10-CM

## 2024-06-05 DIAGNOSIS — O09.522 SUPERVISION OF ELDERLY MULTIGRAVIDA IN SECOND TRIMESTER: Primary | ICD-10-CM

## 2024-06-05 PROBLEM — O09.90 SUPERVISION OF HIGH RISK PREGNANCY, ANTEPARTUM: Status: ACTIVE | Noted: 2024-06-05

## 2024-06-05 PROCEDURE — 0502F SUBSEQUENT PRENATAL CARE: CPT | Performed by: STUDENT IN AN ORGANIZED HEALTH CARE EDUCATION/TRAINING PROGRAM

## 2024-06-05 NOTE — PROGRESS NOTES
43yo  at 13w0d here for MILADY. Unprotected sex last week with FOP, since this AM having some vaginal burning and thick white discharge. Concerned for STD. Exam c/w candidiasis. Nuswab collected. Sending monistat.     No ob complaints.  Reviewed anti K antibodies with titer 1:2 and the implications with respect to HDHN. Will trend T&S q month.  Dual kit today. RTC in 4w.

## 2024-06-06 LAB
ABO + RH BLD: NORMAL
BLOOD BANK CMNT PATIENT-IMP: NORMAL
BLOOD GROUP ANTIBODIES SERPL: NORMAL
BLOOD GROUP ANTIBODIES SERPL: NORMAL
SPECIMEN EXP DATE BLD: NORMAL

## 2024-06-08 LAB — SPECIMEN STATUS REPORT: NORMAL

## 2024-06-09 LAB
A VAGINAE DNA VAG QL NAA+PROBE: ABNORMAL SCORE
BVAB2 DNA VAG QL NAA+PROBE: ABNORMAL SCORE
C ALBICANS DNA VAG QL NAA+PROBE: POSITIVE
C GLABRATA DNA VAG QL NAA+PROBE: NEGATIVE
C TRACH DNA VAG QL NAA+PROBE: NEGATIVE
MEGA1 DNA VAG QL NAA+PROBE: ABNORMAL SCORE
N GONORRHOEA DNA VAG QL NAA+PROBE: NEGATIVE
T VAGINALIS DNA VAG QL NAA+PROBE: NEGATIVE

## 2024-06-17 LAB
EGFR GENE MUT TESTED BLD/T: NEGATIVE
KRAS GENE MUT ANL BLD/T: NEGATIVE
Lab: ABNORMAL
Lab: POSITIVE
MICROARRAY PLATFORM: NEGATIVE
NTRA ALPHA-THALASSEMIA: POSITIVE
NTRA BATTEN DISEASE (NEURONAL CEROID LIPOFUSCINOSIS, CLN3-RELATED): NEGATIVE
NTRA BETA-HEMOGLOBINOPATHIES: NEGATIVE
NTRA BLOOM SYNDROME: NEGATIVE
NTRA CANAVAN DISEASE: NEGATIVE
NTRA CITRULLINEMIA, TYPE I: NEGATIVE
NTRA CYSTIC FIBROSIS: NEGATIVE
NTRA DUCHENNE/BECKER MUSCULAR DYSTROPHY: NEGATIVE
NTRA FAMILIAL DYSAUTONOMIA: NEGATIVE
NTRA FANCONI ANEMIA, GROUP C: NEGATIVE
NTRA FRAGILE X SYNDROME: NEGATIVE
NTRA GALACTOSEMIA: NEGATIVE
NTRA GAUCHER DISEASE: NEGATIVE
NTRA GLYCOGEN STORAGE DISEASE, TYPE 1A: NEGATIVE
NTRA ISOVALERIC ACIDEMIA: NEGATIVE
NTRA MEDIUM CHAIN ACYL-COA DEHYDROGENASE DEFICIENCY: NEGATIVE
NTRA METHYLMALONIC ACIDURIA AND HOMOCYSTINURIA, TYPE CBLC: NEGATIVE
NTRA MUCOLIPIDOSIS, TYPE IV: NEGATIVE
NTRA POLYCYSTIC KIDNEY DISEASE, AUTOSOMAL RECESSIVE: NEGATIVE
NTRA SMITH-LEMLI-OPITZ SYNDROME: NEGATIVE
NTRA SPINAL MUSCULAR ATROPHY: POSITIVE
NTRA TAY-SACHS DISEASE: NEGATIVE
NTRA TYROSINEMIA, TYPE I: NEGATIVE
NTRA ZELLWEGER SPECTRUM DISORDERS, PEX1-RELATED: NEGATIVE

## 2024-06-25 ENCOUNTER — HOSPITAL ENCOUNTER (EMERGENCY)
Facility: HOSPITAL | Age: 44
Discharge: HOME | End: 2024-06-25
Attending: STUDENT IN AN ORGANIZED HEALTH CARE EDUCATION/TRAINING PROGRAM
Payer: COMMERCIAL

## 2024-06-25 VITALS
HEIGHT: 66 IN | OXYGEN SATURATION: 100 % | WEIGHT: 154 LBS | RESPIRATION RATE: 18 BRPM | SYSTOLIC BLOOD PRESSURE: 104 MMHG | DIASTOLIC BLOOD PRESSURE: 65 MMHG | HEART RATE: 79 BPM | TEMPERATURE: 98.3 F | BODY MASS INDEX: 24.75 KG/M2

## 2024-06-25 DIAGNOSIS — E44.1 MILD PROTEIN-CALORIE MALNUTRITION (HCC): ICD-10-CM

## 2024-06-25 DIAGNOSIS — E86.0 DEHYDRATION: Primary | ICD-10-CM

## 2024-06-25 DIAGNOSIS — R63.8 DECREASED ORAL INTAKE: ICD-10-CM

## 2024-06-25 LAB
ALBUMIN SERPL-MCNC: 2.9 G/DL (ref 3.5–5)
ALBUMIN/GLOB SERPL: 0.7 (ref 1.1–2.2)
ALP SERPL-CCNC: 45 U/L (ref 45–117)
ALT SERPL-CCNC: 22 U/L (ref 12–78)
ANION GAP SERPL CALC-SCNC: 6 MMOL/L (ref 5–15)
AST SERPL W P-5'-P-CCNC: 16 U/L (ref 15–37)
BASOPHILS # BLD: 0 K/UL (ref 0–0.1)
BASOPHILS NFR BLD: 0 % (ref 0–1)
BILIRUB SERPL-MCNC: 0.4 MG/DL (ref 0.2–1)
BUN SERPL-MCNC: 6 MG/DL (ref 6–20)
BUN/CREAT SERPL: 10 (ref 12–20)
CA-I BLD-MCNC: 9.6 MG/DL (ref 8.5–10.1)
CHLORIDE SERPL-SCNC: 105 MMOL/L (ref 97–108)
CO2 SERPL-SCNC: 25 MMOL/L (ref 21–32)
CREAT SERPL-MCNC: 0.6 MG/DL (ref 0.55–1.02)
DIFFERENTIAL METHOD BLD: ABNORMAL
EOSINOPHIL # BLD: 0.1 K/UL (ref 0–0.4)
EOSINOPHIL NFR BLD: 2 % (ref 0–7)
ERYTHROCYTE [DISTWIDTH] IN BLOOD BY AUTOMATED COUNT: 17.2 % (ref 11.5–14.5)
GLOBULIN SER CALC-MCNC: 3.9 G/DL (ref 2–4)
GLUCOSE SERPL-MCNC: 116 MG/DL (ref 65–100)
HCT VFR BLD AUTO: 34.4 % (ref 35–47)
HGB BLD-MCNC: 11.2 G/DL (ref 11.5–16)
IMM GRANULOCYTES # BLD AUTO: 0 K/UL (ref 0–0.04)
IMM GRANULOCYTES NFR BLD AUTO: 0 % (ref 0–0.5)
LYMPHOCYTES # BLD: 1.6 K/UL (ref 0.8–3.5)
LYMPHOCYTES NFR BLD: 32 % (ref 12–49)
MCH RBC QN AUTO: 26.9 PG (ref 26–34)
MCHC RBC AUTO-ENTMCNC: 32.6 G/DL (ref 30–36.5)
MCV RBC AUTO: 82.5 FL (ref 80–99)
MONOCYTES # BLD: 0.5 K/UL (ref 0–1)
MONOCYTES NFR BLD: 9 % (ref 5–13)
NEUTS SEG # BLD: 2.9 K/UL (ref 1.8–8)
NEUTS SEG NFR BLD: 57 % (ref 32–75)
NRBC # BLD: 0 K/UL (ref 0–0.01)
NRBC BLD-RTO: 0 PER 100 WBC
PLATELET # BLD AUTO: 143 K/UL (ref 150–400)
PMV BLD AUTO: 11.3 FL (ref 8.9–12.9)
POTASSIUM SERPL-SCNC: 3.9 MMOL/L (ref 3.5–5.1)
PROT SERPL-MCNC: 6.8 G/DL (ref 6.4–8.2)
RBC # BLD AUTO: 4.17 M/UL (ref 3.8–5.2)
SODIUM SERPL-SCNC: 136 MMOL/L (ref 136–145)
WBC # BLD AUTO: 5.1 K/UL (ref 3.6–11)

## 2024-06-25 PROCEDURE — 96360 HYDRATION IV INFUSION INIT: CPT

## 2024-06-25 PROCEDURE — 2580000003 HC RX 258: Performed by: STUDENT IN AN ORGANIZED HEALTH CARE EDUCATION/TRAINING PROGRAM

## 2024-06-25 PROCEDURE — 36415 COLL VENOUS BLD VENIPUNCTURE: CPT

## 2024-06-25 PROCEDURE — 80053 COMPREHEN METABOLIC PANEL: CPT

## 2024-06-25 PROCEDURE — 99284 EMERGENCY DEPT VISIT MOD MDM: CPT

## 2024-06-25 PROCEDURE — 96361 HYDRATE IV INFUSION ADD-ON: CPT

## 2024-06-25 PROCEDURE — 85025 COMPLETE CBC W/AUTO DIFF WBC: CPT

## 2024-06-25 RX ORDER — 0.9 % SODIUM CHLORIDE 0.9 %
1000 INTRAVENOUS SOLUTION INTRAVENOUS ONCE
Status: COMPLETED | OUTPATIENT
Start: 2024-06-25 | End: 2024-06-25

## 2024-06-25 RX ADMIN — SODIUM CHLORIDE 1000 ML: 9 INJECTION, SOLUTION INTRAVENOUS at 21:08

## 2024-06-25 RX ADMIN — SODIUM CHLORIDE 1000 ML: 9 INJECTION, SOLUTION INTRAVENOUS at 21:58

## 2024-06-25 ASSESSMENT — LIFESTYLE VARIABLES
HOW OFTEN DO YOU HAVE A DRINK CONTAINING ALCOHOL: NEVER
HOW MANY STANDARD DRINKS CONTAINING ALCOHOL DO YOU HAVE ON A TYPICAL DAY: PATIENT DOES NOT DRINK

## 2024-06-25 ASSESSMENT — PAIN SCALES - GENERAL: PAINLEVEL_OUTOF10: 0

## 2024-06-26 NOTE — DISCHARGE INSTRUCTIONS
Thank you for choosing our Emergency Department for your care.  It is our privilege to care for you in your time of need.  In the next several days, you may receive a survey via email or mailed to your home about your experience with our team.  We would greatly appreciate you taking a few minutes to complete the survey, as we use this information to learn what we have done well and what we could be doing better. Thank you for trusting us with your care!    Below you will find a list of your tests from today's visit.   Labs  Recent Results (from the past 12 hour(s))   CBC with Auto Differential    Collection Time: 06/25/24  8:58 PM   Result Value Ref Range    WBC 5.1 3.6 - 11.0 K/uL    RBC 4.17 3.80 - 5.20 M/uL    Hemoglobin 11.2 (L) 11.5 - 16.0 g/dL    Hematocrit 34.4 (L) 35.0 - 47.0 %    MCV 82.5 80.0 - 99.0 FL    MCH 26.9 26.0 - 34.0 PG    MCHC 32.6 30.0 - 36.5 g/dL    RDW 17.2 (H) 11.5 - 14.5 %    Platelets 143 (L) 150 - 400 K/uL    MPV 11.3 8.9 - 12.9 FL    Nucleated RBCs 0.0 0.0  WBC    nRBC 0.00 0.00 - 0.01 K/uL    Neutrophils % 57 32 - 75 %    Lymphocytes % 32 12 - 49 %    Monocytes % 9 5 - 13 %    Eosinophils % 2 0 - 7 %    Basophils % 0 0 - 1 %    Immature Granulocytes % 0 0 - 0.5 %    Neutrophils Absolute 2.9 1.8 - 8.0 K/UL    Lymphocytes Absolute 1.6 0.8 - 3.5 K/UL    Monocytes Absolute 0.5 0.0 - 1.0 K/UL    Eosinophils Absolute 0.1 0.0 - 0.4 K/UL    Basophils Absolute 0.0 0.0 - 0.1 K/UL    Immature Granulocytes Absolute 0.0 0.00 - 0.04 K/UL    Differential Type AUTOMATED     Comprehensive Metabolic Panel    Collection Time: 06/25/24  8:58 PM   Result Value Ref Range    Sodium 136 136 - 145 mmol/L    Potassium 3.9 3.5 - 5.1 mmol/L    Chloride 105 97 - 108 mmol/L    CO2 25 21 - 32 mmol/L    Anion Gap 6 5 - 15 mmol/L    Glucose 116 (H) 65 - 100 mg/dL    BUN 6 6 - 20 mg/dL    Creatinine 0.60 0.55 - 1.02 mg/dL    BUN/Creatinine Ratio 10 (L) 12 - 20      Est, Glom Filt Rate >90 >60 ml/min/1.73m2

## 2024-06-26 NOTE — ED TRIAGE NOTES
Four months pregnant, December 8th due date. Recently had gastric bypass reversed,  her feeding tube fell out and has been out for awhile, little PO intake, no fluids. Patient states that she just does not feel good at all. Unable to eat. G11, P4, AB 6.   [facility],   [place] who performed gastric [facility] reversal [facility].

## 2024-06-26 NOTE — ED PROVIDER NOTES
[facility] EMERGENCY DEPT  EMERGENCY DEPARTMENT HISTORY AND PHYSICAL EXAM      Date: 6/25/2024  Patient Name: Sharri Pruitt  MRN: [id number]  Birthdate 1980  Date of evaluation: 6/25/2024  Provider: Gamal Strange MD   Note Started: 10:18 PM EDT 6/25/24    HISTORY OF PRESENT ILLNESS     Chief Complaint   Patient presents with    pregnancy complaints       History Provided By: Patient    HPI: Sharri Pruitt is a 44 y.o. female PMH patient previous gastric bypass that was reversed in December with abdominal feeding tube in place until March to help increase her feeding, and unfortunately came out at that time and patient is now reliant on p.o. intake for the last 3 months, currently 4 months pregnant (G12, 5 previous full gestations, 3 miscarriages, 4 abortions) current due date December 8 of this year, followed by OB/GYN Dr. Munira Omalley at [facility].  Presenting with decreased p.o. intake and concern about dehydration.  Patient reports she has called her OB/GYN multiple times with concerns about her symptoms and they told her to come to the ER as her next appointment is not for several weeks.  Patient denies any abdominal pain or vaginal bleeding but she reports poor p.o. intake and a high level of stress about her symptoms.  She believes she is dehydrated and would like to be evaluated and have fetal heart tones performed on baby.  She denies any fevers, nausea, vomiting.  She reports constipation which is baseline for her but no abdominal pain.  She is worried about not getting enough weight during this pregnancy but denies any diagnosis of decreased fetal weight/growth.    PAST MEDICAL HISTORY   Past Medical History:  Past Medical History:   Diagnosis Date    Anemia     Anxiety     GERD (gastroesophageal reflux disease)     Hepatitis C 2010    treated    History of blood transfusion     SEVERAL TRNASFUSIONS LAST ONE 2021 NO REACTIONS TO ANY INFUSIONS    History of MRSA infection     Hx of abnormal Pap smear

## 2024-07-10 ENCOUNTER — ROUTINE PRENATAL (OUTPATIENT)
Age: 44
End: 2024-07-10

## 2024-07-10 ENCOUNTER — PATIENT MESSAGE (OUTPATIENT)
Age: 44
End: 2024-07-10

## 2024-07-10 VITALS — SYSTOLIC BLOOD PRESSURE: 112 MMHG | BODY MASS INDEX: 24.86 KG/M2 | WEIGHT: 154 LBS | DIASTOLIC BLOOD PRESSURE: 72 MMHG

## 2024-07-10 DIAGNOSIS — O09.90 SUPERVISION OF HIGH RISK PREGNANCY, ANTEPARTUM: ICD-10-CM

## 2024-07-10 DIAGNOSIS — Z36.9 ENCOUNTER FOR ANTENATAL SCREENING OF MOTHER: Primary | ICD-10-CM

## 2024-07-10 PROCEDURE — 0502F SUBSEQUENT PRENATAL CARE: CPT | Performed by: STUDENT IN AN ORGANIZED HEALTH CARE EDUCATION/TRAINING PROGRAM

## 2024-07-10 RX ORDER — ASPIRIN 81 MG/1
81 TABLET ORAL DAILY
Qty: 90 TABLET | Refills: 0 | Status: SHIPPED | OUTPATIENT
Start: 2024-07-10

## 2024-07-10 NOTE — PROGRESS NOTES
45yo  at 18w0d here for MILADY. Very busy with work and school, feeling fatigued. Discussed the importance of regular sleep. T&S repeat today to trend antiK titer, msAFP today. Partner getting carrier testing. RTC in 4w for MILADY. Anatomy scheduled with [facility].

## 2024-07-12 LAB
BLD GP AB SCN TITR SERPL: NORMAL {TITER}
BLOOD BANK CMNT PATIENT-IMP: NORMAL

## 2024-07-13 LAB
AFP INTERP SERPL-IMP: NORMAL
AFP MOM SERPL: 1.44
AFP SERPL-MCNC: 62.6 NG/ML
AGE AT DELIVERY: 44.5 YR
AGE OF EGG DONOR: NORMAL
AGE OF EGG DONOR: NORMAL
COMMENT: NORMAL
DONOR EGG?: NO
DONOR EGG?: NORMAL
FAMILY HISTORY NTD: NORMAL
FHX NTD (Y OR N): NORMAL
GA METHOD: NORMAL
GA: 18 WEEKS
IDDM PATIENT QL: NO
INSULIN DEP. DIABETIC: NORMAL
Lab: 154
Lab: NORMAL
Lab: NORMAL
MAT SCN FOR FETAL ABNORMALITIES SERPL: NORMAL
MULTIPLE PREGNANCY: NO
NEURAL TUBE DEFECT RISK FETUS: 3220
NUMBER OF FETUSES: NO
OTHER INDICATIONS: NO
OTHER INDICATIONS: NORMAL
PREVIOUSLY ELEVATED AFP (Y OR N): 18
PREVIOUSLY ELEVATED AFP (Y OR N): NO
PRIOR 1ST TRIM TESTING ?: NO
PRIOR 2ND TRIM TESTING ?: NO
PRIOR DS/NTD SCREEN CURRENT PREGNANCY?: NO
PRIOR DS/NTD SCREEN CURRENT PREGNANCY?: NORMAL
PRIOR FIRST TRIMESTER TESTING (Y OR N ): NORMAL
PRIOR PREGNANCY WITH DOWN SYNDROME (Y OR N): 1
PRIOR PREGNANCY WITH DOWN SYNDROME (Y OR N): NO
TYPE OF EGG DONOR: NORMAL
TYPE OF EGG DONOR: NORMAL

## 2024-07-17 ENCOUNTER — TELEPHONE (OUTPATIENT)
Age: 44
End: 2024-07-17

## 2024-07-17 ENCOUNTER — HOSPITAL ENCOUNTER (EMERGENCY)
Facility: HOSPITAL | Age: 44
Discharge: HOME | End: 2024-07-17
Attending: STUDENT IN AN ORGANIZED HEALTH CARE EDUCATION/TRAINING PROGRAM
Payer: COMMERCIAL

## 2024-07-17 ENCOUNTER — PATIENT MESSAGE (OUTPATIENT)
Age: 44
End: 2024-07-17

## 2024-07-17 VITALS
OXYGEN SATURATION: 100 % | WEIGHT: 155 LBS | SYSTOLIC BLOOD PRESSURE: 110 MMHG | BODY MASS INDEX: 25.02 KG/M2 | DIASTOLIC BLOOD PRESSURE: 62 MMHG | TEMPERATURE: 98 F | RESPIRATION RATE: 20 BRPM | HEART RATE: 66 BPM

## 2024-07-17 DIAGNOSIS — E86.0 DEHYDRATION: Primary | ICD-10-CM

## 2024-07-17 LAB
ALBUMIN SERPL-MCNC: 2.9 G/DL (ref 3.5–5)
ALBUMIN/GLOB SERPL: 0.8 (ref 1.1–2.2)
ALP SERPL-CCNC: 52 U/L (ref 45–117)
ALT SERPL-CCNC: 20 U/L (ref 12–78)
ANION GAP SERPL CALC-SCNC: 8 MMOL/L (ref 5–15)
AST SERPL W P-5'-P-CCNC: 17 U/L (ref 15–37)
BASOPHILS # BLD: 0 K/UL (ref 0–0.1)
BASOPHILS NFR BLD: 0 % (ref 0–1)
BILIRUB SERPL-MCNC: 0.5 MG/DL (ref 0.2–1)
BUN SERPL-MCNC: 7 MG/DL (ref 6–20)
BUN/CREAT SERPL: 13 (ref 12–20)
CA-I BLD-MCNC: 9 MG/DL (ref 8.5–10.1)
CHLORIDE SERPL-SCNC: 106 MMOL/L (ref 97–108)
CO2 SERPL-SCNC: 24 MMOL/L (ref 21–32)
CREAT SERPL-MCNC: 0.52 MG/DL (ref 0.55–1.02)
DIFFERENTIAL METHOD BLD: ABNORMAL
EOSINOPHIL # BLD: 0 K/UL (ref 0–0.4)
EOSINOPHIL NFR BLD: 1 % (ref 0–7)
ERYTHROCYTE [DISTWIDTH] IN BLOOD BY AUTOMATED COUNT: 15.8 % (ref 11.5–14.5)
GLOBULIN SER CALC-MCNC: 3.6 G/DL (ref 2–4)
GLUCOSE SERPL-MCNC: 72 MG/DL (ref 65–100)
HCT VFR BLD AUTO: 34.3 % (ref 35–47)
HGB BLD-MCNC: 11.2 G/DL (ref 11.5–16)
IMM GRANULOCYTES # BLD AUTO: 0 K/UL (ref 0–0.04)
IMM GRANULOCYTES NFR BLD AUTO: 0 % (ref 0–0.5)
LYMPHOCYTES # BLD: 1.3 K/UL (ref 0.8–3.5)
LYMPHOCYTES NFR BLD: 26 % (ref 12–49)
MCH RBC QN AUTO: 27.7 PG (ref 26–34)
MCHC RBC AUTO-ENTMCNC: 32.7 G/DL (ref 30–36.5)
MCV RBC AUTO: 84.9 FL (ref 80–99)
MONOCYTES # BLD: 0.5 K/UL (ref 0–1)
MONOCYTES NFR BLD: 10 % (ref 5–13)
NEUTS SEG # BLD: 3.2 K/UL (ref 1.8–8)
NEUTS SEG NFR BLD: 63 % (ref 32–75)
NRBC # BLD: 0 K/UL (ref 0–0.01)
NRBC BLD-RTO: 0 PER 100 WBC
PLATELET # BLD AUTO: 151 K/UL (ref 150–400)
PMV BLD AUTO: 11.9 FL (ref 8.9–12.9)
POTASSIUM SERPL-SCNC: 3.4 MMOL/L (ref 3.5–5.1)
PROT SERPL-MCNC: 6.5 G/DL (ref 6.4–8.2)
RBC # BLD AUTO: 4.04 M/UL (ref 3.8–5.2)
SODIUM SERPL-SCNC: 138 MMOL/L (ref 136–145)
WBC # BLD AUTO: 5.1 K/UL (ref 3.6–11)

## 2024-07-17 PROCEDURE — 93005 ELECTROCARDIOGRAM TRACING: CPT | Performed by: STUDENT IN AN ORGANIZED HEALTH CARE EDUCATION/TRAINING PROGRAM

## 2024-07-17 PROCEDURE — 99284 EMERGENCY DEPT VISIT MOD MDM: CPT

## 2024-07-17 PROCEDURE — 80053 COMPREHEN METABOLIC PANEL: CPT

## 2024-07-17 PROCEDURE — 36415 COLL VENOUS BLD VENIPUNCTURE: CPT

## 2024-07-17 PROCEDURE — 96360 HYDRATION IV INFUSION INIT: CPT

## 2024-07-17 PROCEDURE — 2580000003 HC RX 258: Performed by: STUDENT IN AN ORGANIZED HEALTH CARE EDUCATION/TRAINING PROGRAM

## 2024-07-17 PROCEDURE — 85025 COMPLETE CBC W/AUTO DIFF WBC: CPT

## 2024-07-17 RX ORDER — 0.9 % SODIUM CHLORIDE 0.9 %
1000 INTRAVENOUS SOLUTION INTRAVENOUS ONCE
Status: COMPLETED | OUTPATIENT
Start: 2024-07-17 | End: 2024-07-17

## 2024-07-17 RX ORDER — ACETAMINOPHEN 500 MG
1000 TABLET ORAL
Status: DISCONTINUED | OUTPATIENT
Start: 2024-07-17 | End: 2024-07-17 | Stop reason: HOSPADM

## 2024-07-17 RX ADMIN — SODIUM CHLORIDE 1000 ML: 9 INJECTION, SOLUTION INTRAVENOUS at 13:59

## 2024-07-17 ASSESSMENT — PAIN - FUNCTIONAL ASSESSMENT
PAIN_FUNCTIONAL_ASSESSMENT: 0-10
PAIN_FUNCTIONAL_ASSESSMENT: NONE - DENIES PAIN

## 2024-07-17 ASSESSMENT — PAIN SCALES - GENERAL: PAINLEVEL_OUTOF10: 7

## 2024-07-17 NOTE — ED PROVIDER NOTES
[facility] EMERGENCY DEPT  EMERGENCY DEPARTMENT HISTORY AND PHYSICAL EXAM      Date: 2024  Patient Name: Sharri Pruitt  MRN: [id number]  Birthdate 1980  Date of evaluation: 2024  Provider: Gamal Strange MD   Note Started: 3:06 PM EDT 24    HISTORY OF PRESENT ILLNESS     Chief Complaint   Patient presents with    Dehydration    Dizziness       History Provided By: Patient    HPI: Sharri Pruitt is a 44 y.o. female PMH anxiety, GERD, psychiatric disorder, presenting with dizziness and dehydration.  Patient reports she has had multiple ER visits for this and usually feels better after IV fluids.  She reports her dizziness has been going on for several months and not acutely worse today but she was drinking much less water.  She has difficulty drinking water due to her previous medical conditions.  Denies any fevers, chest pain, belly pain at this time.    PAST MEDICAL HISTORY   Past Medical History:  Past Medical History:   Diagnosis Date    Anemia     Anxiety     GERD (gastroesophageal reflux disease)     Hepatitis C     treated    History of blood transfusion     SEVERAL TRNASFUSIONS LAST ONE  NO REACTIONS TO ANY INFUSIONS    History of MRSA infection     Hx of abnormal Pap smear     Ill-defined condition     anemia hx, has received several blood transfusion, last blood transfusion May 5 2014    Intestinal perforation (HCC)     Psychiatric disorder     OCD    PUD (peptic ulcer disease)        Past Surgical History:  Past Surgical History:   Procedure Laterality Date    ESOPHAGEAL DILATATION      GASTRIC BYPASS SURGERY      GI  2009    perforated intestines    GYN       x 4    OTHER SURGICAL HISTORY  21    FEEDING TUBE  REMOVED 2021    LUCIANO-EN-Y GASTRIC BYPASS N/A 2023    ROBOTIC REVERSAL GASTRIC BYPASS, ESOPHAGOGASTRODUODENOSCOPY (ERAS), J-TUBE PLACEMENT AND PYLORAPLASTY performed by Marko Keyes MD at [facility] MAIN OR    UPPER GASTROINTESTINAL ENDOSCOPY N/A

## 2024-07-17 NOTE — ED TRIAGE NOTES
Pt reports dizziness, fatigue, headache. Pts OB at [facility], approx 19 weeks pregnancy.

## 2024-07-17 NOTE — TELEPHONE ENCOUNTER
Two patient identifiers used    44 year old patient  19w0d pregnant    Patient is currently in the er at [facility] for feeling dizzy , has headache and dehydrated    Patient reports she has gotten one bag of fluids and does not feel any different. Patient states they were going to give her tylenol for the fluids    Patient denies vaginal bleeding,ROM, contractions, and is feeling the baby move    Patient has had gastric bypass surgery is having trouble drinking enough fluids and eating     Patient is feeling like she needs more fluids . Patient reports her urine is dark  in color and she does not void frequent ly.    Patient was advised to tell Er nurse /MD how she is feeling and ask for more fluids      This nurse went to speak to Dr. Omalley who recommended that she ask for more fluids .  When this nurse got back to the phone the call had dropped    This nurse attempted to reach the patient and left a message for the patient to check her my chart /call the office back if needed      My chart message sent to patient

## 2024-07-17 NOTE — DISCHARGE INSTRUCTIONS
PRIMARY CARE in [facility]:  [address] [place] [address].  [phone]  Breonna Marvin MD Family Medicine  Peter Caldwell MD Family Medicine  Jamil Griggs MD Family Medicine    [facility] Medicine: [address] [place] [address]. [phone]   Ramiro Williamson MD Family Medicine  Rodolfo Hernandez, RAJIV Family Medicine  Leydi Garvin, RAJIV Family Medicine    [facility]: [address], [address], [place] [address]. [phone]  June Pryor MD Family Medicine  Martita Husain MD Family Medicine  Jewel Keyes, RAJIV Family Medicine  Keyana Stark, RAJIV Family Medicine    [facility]: [address], [address] [address]. [phone]  Morenita Mike MD Internal Medicine  French Dang MD Internal Medicine  Ryan Bellamy MD Internal Medicine  Yesenia Washburn, PA Family Medicine    [facility]: [address], [place] [address]. [phone]  Kyleigh Jacobsen MD Family Medicine  Claudine Flannery MD Family Medicine  Ar Floyd, DO Infectious Disease  Gamal Gamez MD Family Medicine    [facility]: [address], [address], [place] [address]. [phone]  Viri Berry,  Family Medicine  Iman Keyes NP Internal Medicine  Francie Rico, RAJIV Internal Medicine    [facility]: [address], [place] [address]. [phone]  Prashanth Bourgeois MD Internal Medicine  Iraj Garcia MD Internal Medicine    [facility]: [address] [place] [address]. [phone]  Manjula Ramirez MD Family Medicine  Nickie Rubin MD Family Medicine  Cedrick Pickard MD Family Medicine  Su Valdez, RAJIV Family Medicine  Roshan Keyes, ARPN, CNP Family Medicine    [facility]

## 2024-07-18 ENCOUNTER — HOSPITAL ENCOUNTER (OUTPATIENT)
Facility: HOSPITAL | Age: 44
Setting detail: INFUSION SERIES
Discharge: HOME | End: 2024-07-18
Payer: COMMERCIAL

## 2024-07-18 VITALS
TEMPERATURE: 97.4 F | HEART RATE: 65 BPM | OXYGEN SATURATION: 99 % | DIASTOLIC BLOOD PRESSURE: 65 MMHG | SYSTOLIC BLOOD PRESSURE: 102 MMHG | RESPIRATION RATE: 20 BRPM

## 2024-07-18 DIAGNOSIS — E86.0 DEHYDRATION: Primary | ICD-10-CM

## 2024-07-18 LAB
EKG ATRIAL RATE: 83 BPM
EKG DIAGNOSIS: NORMAL
EKG P AXIS: 81 DEGREES
EKG P-R INTERVAL: 134 MS
EKG Q-T INTERVAL: 358 MS
EKG QRS DURATION: 78 MS
EKG QTC CALCULATION (BAZETT): 420 MS
EKG R AXIS: 62 DEGREES
EKG T AXIS: 38 DEGREES
EKG VENTRICULAR RATE: 83 BPM

## 2024-07-18 PROCEDURE — 96360 HYDRATION IV INFUSION INIT: CPT

## 2024-07-18 PROCEDURE — 96361 HYDRATE IV INFUSION ADD-ON: CPT

## 2024-07-18 PROCEDURE — 2580000003 HC RX 258: Performed by: NURSE PRACTITIONER

## 2024-07-18 RX ORDER — SODIUM CHLORIDE, SODIUM LACTATE, POTASSIUM CHLORIDE, AND CALCIUM CHLORIDE .6; .31; .03; .02 G/100ML; G/100ML; G/100ML; G/100ML
2000 INJECTION, SOLUTION INTRAVENOUS ONCE
Status: CANCELLED
Start: 2024-07-18 | End: 2024-07-18

## 2024-07-18 RX ORDER — SODIUM CHLORIDE, SODIUM LACTATE, POTASSIUM CHLORIDE, AND CALCIUM CHLORIDE .6; .31; .03; .02 G/100ML; G/100ML; G/100ML; G/100ML
2000 INJECTION, SOLUTION INTRAVENOUS ONCE
Status: COMPLETED | OUTPATIENT
Start: 2024-07-18 | End: 2024-07-18

## 2024-07-18 RX ADMIN — SODIUM CHLORIDE, POTASSIUM CHLORIDE, SODIUM LACTATE AND CALCIUM CHLORIDE 2000 ML: 600; 310; 30; 20 INJECTION, SOLUTION INTRAVENOUS at 11:02

## 2024-07-18 ASSESSMENT — PAIN SCALES - GENERAL: PAINLEVEL_OUTOF10: 7

## 2024-07-18 ASSESSMENT — PAIN DESCRIPTION - LOCATION: LOCATION: HEAD

## 2024-07-18 ASSESSMENT — PAIN DESCRIPTION - DESCRIPTORS: DESCRIPTORS: ACHING

## 2024-07-18 NOTE — TELEPHONE ENCOUNTER
Spoke with provider he request 2L of LR at [facility]. Patient notified and appt. Set @11am 7/18/24.

## 2024-07-18 NOTE — PROGRESS NOTES
[facility] Peds/Adult Note                   Date: 2024    Name: Sharri Pruitt    MRN: [id number]         : 1980    1050 - Patient arrives for IV Hydration without acute problems. Please see Epic for complete assessment and education provided.    Vital signs stable throughout and prior to discharge. Patient tolerated procedure well and was discharged without incident. Patient is aware of no upcoming [facility] appointments and to follow up with healthcare provider.      Ms. Pruitt's vitals were reviewed prior to and after treatment.   Patient Vitals for the past 12 hrs:   Temp Pulse Resp BP SpO2   24 1323 -- 65 20 102/65 --   24 1052 97.4 °F (36.3 °C) 72 20 108/76 99 %       Lab results were reviewed.  No results found for this or any previous visit (from the past 12 hour(s)).    Medications given:   Medications Administered         lactated ringers bolus 2,000 mL Admin Date  2024 Action  New Bag Dose  2,000 mL Rate  991.7 mL/hr Route  IntraVENous Administered By  Leydi Rodriguez, KATHIE            Ms. Pruitt tolerated the infusion, and had no complaints.    [facility]. Discharge Instructions provided to patient, patient verbalized understanding but denied the request for a copy of after visit summary.     Future Appointments   Date Time Provider Department Center   2024  9:45 AM ULTRASOUND 1 [facility] BS [facility]   2024  1:00 PM Munira Omalley MD [facility] BS [facility]LEEN FIORE RN  2024  1:26 PM

## 2024-07-18 NOTE — TELEPHONE ENCOUNTER
From: Sharri Pruitt  To: Dr. Marko Keyes  Sent: 7/17/2024 3:14 PM EDT  Subject: Fluids    Hey would you please help me. I am still not doing well with this I am weak dehydrated headache dizzy. I need fluids I have been to the hospital where it doesn’t seem as if it’s a big deal. Can I come in to be seen by you.

## 2024-07-30 ENCOUNTER — ROUTINE PRENATAL (OUTPATIENT)
Age: 44
End: 2024-07-30
Payer: COMMERCIAL

## 2024-07-30 VITALS
HEART RATE: 68 BPM | DIASTOLIC BLOOD PRESSURE: 61 MMHG | BODY MASS INDEX: 25.99 KG/M2 | SYSTOLIC BLOOD PRESSURE: 101 MMHG | WEIGHT: 161 LBS

## 2024-07-30 DIAGNOSIS — O35.9XX0 SUSPECTED FETAL ANOMALY, ANTEPARTUM, SINGLE OR UNSPECIFIED FETUS: ICD-10-CM

## 2024-07-30 DIAGNOSIS — Z3A.20 20 WEEKS GESTATION OF PREGNANCY: ICD-10-CM

## 2024-07-30 DIAGNOSIS — O09.522 AMA (ADVANCED MATERNAL AGE) MULTIGRAVIDA 35+, SECOND TRIMESTER: ICD-10-CM

## 2024-07-30 DIAGNOSIS — O35.2XX0 HEREDITARY FAMILIAL DISEASE AFFECTING MANAGEMENT OF MOTHER AND POSSIBLY AFFECTING FETUS, ANTEPARTUM, SINGLE OR UNSPECIFIED FETUS: Primary | ICD-10-CM

## 2024-07-30 DIAGNOSIS — O09.42 GRAND MULTIPARITY WITH ANTENATAL PROBLEM IN SECOND TRIMESTER: ICD-10-CM

## 2024-07-30 PROCEDURE — 76811 OB US DETAILED SNGL FETUS: CPT | Performed by: OBSTETRICS & GYNECOLOGY

## 2024-07-30 PROCEDURE — 99214 OFFICE O/P EST MOD 30 MIN: CPT | Performed by: OBSTETRICS & GYNECOLOGY

## 2024-07-30 NOTE — PROCEDURES
PATIENT: STEVE CORONADO   -  : 1980   -  DOS:2024   -  INTERPRETING PROVIDER:Sanjay Beard,   Indication  ========    AMA, Grand multiparity, history of bariatric surgery and referral, history of severe anemia requiring blood transfusions and intravenous iron infusions, advanced maternal  age. SMA carrier. Alpha thalassemia silent carrier. Advanced paternal age.    Method  ======    Transabdominal ultrasound examination. View: Sufficient    Dating  ======    Previous Ultrasound on: 2024  Type of prior assessment: GA  GA at prior assessment date 8 w + 0 d  GA by previous U/S 20 w + 6 d  DANAY by previous Ultrasound: 2024  Ultrasound examination on: 2024  GA by U/S based upon: AC, BPD, Femur, HC  GA by U/S 21 w + 0 d  DANAY by U/S: 12/10/2024  Assigned: based on ultrasound (GA), selected on 2024  Assigned GA 20 w + 6 d  Assigned DANAY: 2024    Fetal Growth Overview  =================    Exam date        GA              BPD (mm)          HC (mm)              AC (mm)            FL (mm)          HL (mm)             EFW (g)  2024        20w 6d        52.1     83%        183.3    34%        154     34%        34    35%        34.4     77%        370    36%    Fetal Biometry  ============    Standard  BPD 52.1 mm 21w 6d 83% Hadlock  OFD 62.8 mm 21w 3d 71% Shan  .3 mm 20w 5d 34% Hadlock  Cerebellum tr 21.5 mm 20w 2d 44% Hill  Nuchal fold 3.9 mm  .0 mm 20w 4d 34% Hadlock  Femur 34.0 mm 20w 5d 35% Hadlock  Humerus 34.4 mm 21w 5d 77% Shan   g 20w 4d 36% Hadlock  EFW (lb) 0 lb  EFW (oz) 13 oz  EFW by: Hadlock (BPD-HC-AC-FL)  Extended   5.1 mm  CM 3.2 mm  3% Nicolaides  Nasal bone 7.6 mm  Head / Face / Neck  Nasal bone: present  Other Structures   bpm    General Evaluation  ==============    Cardiac activity present.  bpm. Fetal movements: visualized. Presentation: Cephalic  Placenta: Placental site: anterior, appropriate distance from the internal

## 2024-08-02 RX ORDER — ONDANSETRON 4 MG/1
4 TABLET, ORALLY DISINTEGRATING ORAL EVERY 6 HOURS PRN
Qty: 30 TABLET | Refills: 1 | OUTPATIENT
Start: 2024-08-02

## 2024-08-07 ENCOUNTER — ROUTINE PRENATAL (OUTPATIENT)
Age: 44
End: 2024-08-07

## 2024-08-07 VITALS — DIASTOLIC BLOOD PRESSURE: 64 MMHG | BODY MASS INDEX: 26.63 KG/M2 | WEIGHT: 165 LBS | SYSTOLIC BLOOD PRESSURE: 101 MMHG

## 2024-08-07 DIAGNOSIS — O09.90 SUPERVISION OF HIGH RISK PREGNANCY, ANTEPARTUM: ICD-10-CM

## 2024-08-07 DIAGNOSIS — Z36.9 UNSPECIFIED ANTENATAL SCREENING: Primary | ICD-10-CM

## 2024-08-07 LAB
ABO, EXTERNAL RESULT: NORMAL
RH FACTOR, EXTERNAL RESULT: POSITIVE

## 2024-08-07 PROCEDURE — 0502F SUBSEQUENT PRENATAL CARE: CPT | Performed by: STUDENT IN AN ORGANIZED HEALTH CARE EDUCATION/TRAINING PROGRAM

## 2024-08-07 RX ORDER — ONDANSETRON 4 MG/1
4 TABLET, ORALLY DISINTEGRATING ORAL EVERY 6 HOURS PRN
Qty: 30 TABLET | Refills: 1 | Status: SHIPPED | OUTPATIENT
Start: 2024-08-07

## 2024-08-07 NOTE — PROGRESS NOTES
43yo  at 22w0d here for MILADY. Doing better since last visit, stopped doing in person classes and getting more sleep.  In review of prior MFM consult notes, no mention of patient's antiK antibodies. Spoke with Dr. Tinajero, who recommends t&s today as well as unity screening for fetal K antigen to better stratify risk.   RTC in 4w.

## 2024-08-09 LAB
BLD GP AB SCN TITR SERPL: NORMAL {TITER}
BLOOD BANK CMNT PATIENT-IMP: NORMAL

## 2024-08-12 ENCOUNTER — TELEPHONE (OUTPATIENT)
Age: 44
End: 2024-08-12

## 2024-08-12 ENCOUNTER — ROUTINE PRENATAL (OUTPATIENT)
Age: 44
End: 2024-08-12

## 2024-08-12 DIAGNOSIS — O09.522 AMA (ADVANCED MATERNAL AGE) MULTIGRAVIDA 35+, SECOND TRIMESTER: Primary | ICD-10-CM

## 2024-08-16 ENCOUNTER — TELEPHONE (OUTPATIENT)
Age: 44
End: 2024-08-16

## 2024-08-16 NOTE — TELEPHONE ENCOUNTER
I attempted to contact the patient, Sharri Pruitt, today over the phone to review her negative UNITY antigen NIPT results.    The patient did not answer; I lvm requesting call back at her convenience.    Trish Shoemaker MS, [facility]  Licensed, Certified Genetic Counselor

## 2024-09-05 ENCOUNTER — TELEPHONE (OUTPATIENT)
Age: 44
End: 2024-09-05

## 2024-09-05 NOTE — TELEPHONE ENCOUNTER
The patient, Sharri Pruitt, called the office to review her UNITY antigen NIPT results. She states that the phone number we previously had on file for her was incorrect and thus she did not receive the previous voicemails. An updated phone number was obtained and updated in the patient's chart.    The patient previously underwent UNITY NIPT for the [place] antigen. We reviewed today that the screening did NOT detect the reese antigen, meaning that the fetus is not at an increased risk for hemolytic disease of the fetus and  based on this result. It was emphasized to the patient that this screening is not diagnostic and is not considered 100%, but the normal result is reassuring.    We also reviewed that the screening also re-evaluated fetal aneuploidy, and those results were also negative, or normal, for Down syndrome, trisomy 18, trisomy 13 and sex chromosomal aneuploidies.     The patient has a follow up ultrasound and MFM appointment scheduled with our office on 9/10/24.    The patient verbalized her understanding of the information as it was presented to her today; she denies any further questions or concerns at this time.    Trish Shoemaker MS, [facility]  Licensed, Certified Genetic Counselor

## 2024-09-06 ENCOUNTER — TELEPHONE (OUTPATIENT)
Age: 44
End: 2024-09-06

## 2024-09-06 RX ORDER — METRONIDAZOLE 500 MG/1
500 TABLET ORAL 2 TIMES DAILY
Qty: 14 TABLET | Refills: 0 | OUTPATIENT
Start: 2024-09-06 | End: 2024-09-13

## 2024-09-06 RX ORDER — VALACYCLOVIR HYDROCHLORIDE 500 MG/1
500 TABLET, FILM COATED ORAL DAILY
Qty: 90 TABLET | Refills: 3 | Status: SHIPPED | OUTPATIENT
Start: 2024-09-06

## 2024-09-06 NOTE — TELEPHONE ENCOUNTER
Patient was called back and advised of prescription that refill of  valtrex sent and she needed ov for refill of the flagyl    Patient has upcoming appointment to be seen on 9/10/2024 for fob    Patient  26w2d pregnant.  Patient reports she does not have any symptoms but states she just hit refill for every thing she could ask for.    Patient aware prescription not sent    Patient verbalized understanding.

## 2024-09-06 NOTE — TELEPHONE ENCOUNTER
44 year old patient  26w2d pregnant    Patient /pharmacy asking for the following refills.    Patient last had valtrex filled 2022 and flagyl last filled on 2024      Please review amend pended medication from the pharmacy     ? Need ov        Last office visit was 2024 and next ov for fob is 9/10/2024    Please advise    Thank you

## 2024-09-10 ENCOUNTER — ROUTINE PRENATAL (OUTPATIENT)
Age: 44
End: 2024-09-10
Payer: COMMERCIAL

## 2024-09-10 ENCOUNTER — ROUTINE PRENATAL (OUTPATIENT)
Age: 44
End: 2024-09-10

## 2024-09-10 VITALS
SYSTOLIC BLOOD PRESSURE: 111 MMHG | DIASTOLIC BLOOD PRESSURE: 69 MMHG | HEART RATE: 76 BPM | BODY MASS INDEX: 29.38 KG/M2 | WEIGHT: 182 LBS

## 2024-09-10 VITALS — HEART RATE: 75 BPM | SYSTOLIC BLOOD PRESSURE: 109 MMHG | DIASTOLIC BLOOD PRESSURE: 67 MMHG

## 2024-09-10 DIAGNOSIS — O09.529 ANTEPARTUM MULTIGRAVIDA OF ADVANCED MATERNAL AGE: Primary | ICD-10-CM

## 2024-09-10 DIAGNOSIS — Z36.9 UNSPECIFIED ANTENATAL SCREENING: ICD-10-CM

## 2024-09-10 DIAGNOSIS — O09.90 SUPERVISION OF HIGH RISK PREGNANCY, ANTEPARTUM: ICD-10-CM

## 2024-09-10 DIAGNOSIS — O09.90 SUPERVISION OF HIGH RISK PREGNANCY, ANTEPARTUM: Primary | ICD-10-CM

## 2024-09-10 PROCEDURE — 76816 OB US FOLLOW-UP PER FETUS: CPT | Performed by: OBSTETRICS & GYNECOLOGY

## 2024-09-10 PROCEDURE — 0502F SUBSEQUENT PRENATAL CARE: CPT | Performed by: STUDENT IN AN ORGANIZED HEALTH CARE EDUCATION/TRAINING PROGRAM

## 2024-09-12 ENCOUNTER — TELEPHONE (OUTPATIENT)
Age: 44
End: 2024-09-12

## 2024-09-12 LAB — GLUCOSE 1H P 100 G GLC PO SERPL-MCNC: 64 MG/DL (ref 65–140)

## 2024-10-16 ENCOUNTER — ROUTINE PRENATAL (OUTPATIENT)
Age: 44
End: 2024-10-16
Payer: COMMERCIAL

## 2024-10-16 VITALS — HEART RATE: 76 BPM | SYSTOLIC BLOOD PRESSURE: 109 MMHG | DIASTOLIC BLOOD PRESSURE: 73 MMHG

## 2024-10-16 DIAGNOSIS — O09.529 ANTEPARTUM MULTIGRAVIDA OF ADVANCED MATERNAL AGE: Primary | ICD-10-CM

## 2024-10-16 PROCEDURE — 76819 FETAL BIOPHYS PROFIL W/O NST: CPT | Performed by: OBSTETRICS & GYNECOLOGY

## 2024-10-16 PROCEDURE — 76816 OB US FOLLOW-UP PER FETUS: CPT | Performed by: OBSTETRICS & GYNECOLOGY

## 2024-10-17 NOTE — PROCEDURES
PATIENT: STEVE CORONADO   -  : 1980   -  DOS:10/16/2024   -  INTERPRETING PROVIDER:Esperanza Jon,   Indication  ========    AMA, Grand multiparity, history of bariatric surgery and referral, history of severe anemia requiring blood transfusions and intravenous iron infusions, advanced maternal  age. SMA carrier. Alpha thalassemia silent carrier. Advanced paternal age.    Method  ======    Transabdominal ultrasound examination. View: Sufficient    Pregnancy  =========    Patel pregnancy, patel pregnancy. Number of fetuses: 1    Dating  ======    Previous Ultrasound on: 2024  Type of prior assessment: GA  GA at prior assessment date 8 w + 0 d  GA by previous U/S 32 w + 0 d  DANAY by previous Ultrasound: 2024  Ultrasound examination on: 10/16/2024  GA by U/S based upon: AC, BPD, Femur, HC  GA by U/S 33 w + 1 d  DANAY by U/S: 12/3/2024  Assigned: based on ultrasound (GA), selected on 2024  Assigned GA 32 w + 0 d  Assigned DANAY: 2024    Fetal Biometry  ============    Standard  BPD 86.1 mm 34w 5d 97% Hadlock  .4 mm 35w 3d 98% Shan  .3 mm 34w 4d 80% Hadlock  .6 mm 31w 4d 37% Hadlock  Femur 61.2 mm 31w 5d 31% Hadlock  EFW 1,926 g 31w 6d 46% Hadlock  EFW (lb) 4 lb  EFW (oz) 4 oz  EFW by: Hadlock (BPD-HC-AC-FL)  Other Structures   bpm    General Evaluation  ==============    Cardiac activity present.  bpm. Fetal movements: visualized. Presentation: Cephalic  Placenta: Placental site: anterior, appropriate distance from the internal os  Umbilical cord: Cord vessels: 3 vessel cord. Insertion site: central  Amniotic fluid: Amount of AF: normal. MVP 5.1 cm    Fetal Anatomy  ===========    Stomach: normal  Kidneys: normal  Bladder: normal  Wants to know fetal sex: yes    Biophysical Profile  ==============    2: Fetal breathing movements  2: Gross body movements  2: Fetal tone  2: Amniotic fluid volume  8/8 Biophysical profile

## 2024-10-24 ENCOUNTER — ROUTINE PRENATAL (OUTPATIENT)
Age: 44
End: 2024-10-24

## 2024-10-24 VITALS
DIASTOLIC BLOOD PRESSURE: 61 MMHG | HEART RATE: 72 BPM | BODY MASS INDEX: 30.99 KG/M2 | SYSTOLIC BLOOD PRESSURE: 104 MMHG | WEIGHT: 192 LBS

## 2024-10-24 DIAGNOSIS — D50.9 IRON DEFICIENCY ANEMIA, UNSPECIFIED IRON DEFICIENCY ANEMIA TYPE: ICD-10-CM

## 2024-10-24 DIAGNOSIS — Z23 ENCOUNTER FOR IMMUNIZATION: Primary | ICD-10-CM

## 2024-10-24 DIAGNOSIS — O09.90 SUPERVISION OF HIGH RISK PREGNANCY, ANTEPARTUM: ICD-10-CM

## 2024-10-24 DIAGNOSIS — K29.60 REFLUX GASTRITIS: ICD-10-CM

## 2024-10-24 PROCEDURE — 0502F SUBSEQUENT PRENATAL CARE: CPT | Performed by: STUDENT IN AN ORGANIZED HEALTH CARE EDUCATION/TRAINING PROGRAM

## 2024-10-24 RX ORDER — OMEPRAZOLE 40 MG/1
40 CAPSULE, DELAYED RELEASE ORAL
Qty: 90 CAPSULE | Refills: 3 | Status: SHIPPED | OUTPATIENT
Start: 2024-10-24

## 2024-10-24 NOTE — PROGRESS NOTES
45yo  at 33w1d here for MILADY. Follows with MFM, recommend delivery 39w for AMA. Signed consent today, will attempt to schedule for . Not sure if she'll need hdz, grandmultip though long inter-pregnancy interval. RTC in 2w. Encouraged to schedule f/u with MFM. Feeling severe fatigue and dizziness, CBC today.

## 2024-10-24 NOTE — TELEPHONE ENCOUNTER
Late entry    PT was responded to 7/15/24:  Ha Harrell     I am sorry that you do not feel well. Please see your physician for fluid, dizzy and headache.        Zehra Ferreira MA    PT went to the ED for dizziness, headache an possible dehydration on 7/17/24.    8/2/24 PT reached out via MC to ET/MARISOL and message was sent to  for a refill for Flagyl and having heartburn, it was sent to  by ET/MARISOL and responded to by :  Zehra Ferreira MA   to Tawanda Leo MD   ET    8/2/24  2:51 PM  's patient. Please advise below question and refill: May I please have a refill for flagyl  Tawanda Leo MD   to Zehra Ferreira MA       8/2/24  3:02 PM  She needs to take OTC Prilosec or some other reflux medication (esomeprazole, etc) for the heartburn.  Zofran won't help.  Will refill the Flagyl.    Flagyl was last sent 2/16/24.

## 2024-10-25 ENCOUNTER — PATIENT MESSAGE (OUTPATIENT)
Age: 44
End: 2024-10-25

## 2024-10-25 DIAGNOSIS — E61.1 IRON DEFICIENCY: Primary | ICD-10-CM

## 2024-10-25 LAB
ERYTHROCYTE [DISTWIDTH] IN BLOOD BY AUTOMATED COUNT: 14.1 % (ref 11.7–15.4)
FERRITIN SERPL-MCNC: 12 NG/ML (ref 15–150)
HCT VFR BLD AUTO: 32.8 % (ref 34–46.6)
HGB BLD-MCNC: 10.2 G/DL (ref 11.1–15.9)
MCH RBC QN AUTO: 25.3 PG (ref 26.6–33)
MCHC RBC AUTO-ENTMCNC: 31.1 G/DL (ref 31.5–35.7)
MCV RBC AUTO: 81 FL (ref 79–97)
PLATELET # BLD AUTO: 164 X10E3/UL (ref 150–450)
RBC # BLD AUTO: 4.03 X10E6/UL (ref 3.77–5.28)
WBC # BLD AUTO: 4.7 X10E3/UL (ref 3.4–10.8)

## 2024-10-28 ENCOUNTER — TELEPHONE (OUTPATIENT)
Age: 44
End: 2024-10-28

## 2024-10-28 NOTE — TELEPHONE ENCOUNTER
Attempted to call patient to schedule new patient appt. No answer; left vm.     NP/ Iron Deficiency/ Munira Omalley     Next Available

## 2024-10-29 ENCOUNTER — OFFICE VISIT (OUTPATIENT)
Age: 44
End: 2024-10-29
Payer: COMMERCIAL

## 2024-10-29 VITALS
BODY MASS INDEX: 32.17 KG/M2 | OXYGEN SATURATION: 99 % | SYSTOLIC BLOOD PRESSURE: 102 MMHG | RESPIRATION RATE: 20 BRPM | HEART RATE: 95 BPM | HEIGHT: 66 IN | WEIGHT: 200.2 LBS | DIASTOLIC BLOOD PRESSURE: 58 MMHG | TEMPERATURE: 97.3 F

## 2024-10-29 DIAGNOSIS — O99.019 IRON DEFICIENCY ANEMIA DURING PREGNANCY: Primary | ICD-10-CM

## 2024-10-29 DIAGNOSIS — D50.9 IRON DEFICIENCY ANEMIA DURING PREGNANCY: Primary | ICD-10-CM

## 2024-10-29 PROCEDURE — 99204 OFFICE O/P NEW MOD 45 MIN: CPT | Performed by: NURSE PRACTITIONER

## 2024-10-29 RX ORDER — ALBUTEROL SULFATE 90 UG/1
4 INHALANT RESPIRATORY (INHALATION) PRN
Status: CANCELLED | OUTPATIENT
Start: 2024-10-31

## 2024-10-29 RX ORDER — FAMOTIDINE 10 MG/ML
20 INJECTION, SOLUTION INTRAVENOUS
Status: CANCELLED | OUTPATIENT
Start: 2024-10-31

## 2024-10-29 RX ORDER — ACETAMINOPHEN 325 MG/1
650 TABLET ORAL
Status: CANCELLED | OUTPATIENT
Start: 2024-10-31

## 2024-10-29 RX ORDER — FERROUS SULFATE 325(65) MG
325 TABLET ORAL DAILY
Qty: 90 TABLET | Refills: 1 | Status: SHIPPED | OUTPATIENT
Start: 2024-10-29

## 2024-10-29 RX ORDER — HEPARIN SODIUM (PORCINE) LOCK FLUSH IV SOLN 100 UNIT/ML 100 UNIT/ML
500 SOLUTION INTRAVENOUS PRN
Status: CANCELLED | OUTPATIENT
Start: 2024-10-31

## 2024-10-29 RX ORDER — DIPHENHYDRAMINE HYDROCHLORIDE 50 MG/ML
50 INJECTION INTRAMUSCULAR; INTRAVENOUS
Status: CANCELLED | OUTPATIENT
Start: 2024-10-31

## 2024-10-29 RX ORDER — ONDANSETRON 2 MG/ML
8 INJECTION INTRAMUSCULAR; INTRAVENOUS
Status: CANCELLED | OUTPATIENT
Start: 2024-10-31

## 2024-10-29 RX ORDER — SODIUM CHLORIDE 9 MG/ML
5-250 INJECTION, SOLUTION INTRAVENOUS PRN
Status: CANCELLED | OUTPATIENT
Start: 2024-10-31

## 2024-10-29 RX ORDER — EPINEPHRINE 1 MG/ML
0.3 INJECTION, SOLUTION, CONCENTRATE INTRAVENOUS PRN
Status: CANCELLED | OUTPATIENT
Start: 2024-10-31

## 2024-10-29 RX ORDER — SODIUM CHLORIDE 0.9 % (FLUSH) 0.9 %
5-40 SYRINGE (ML) INJECTION PRN
Status: CANCELLED | OUTPATIENT
Start: 2024-10-31

## 2024-10-29 RX ORDER — SODIUM CHLORIDE 9 MG/ML
INJECTION, SOLUTION INTRAVENOUS CONTINUOUS
Status: CANCELLED | OUTPATIENT
Start: 2024-10-31

## 2024-10-29 ASSESSMENT — PATIENT HEALTH QUESTIONNAIRE - PHQ9
1. LITTLE INTEREST OR PLEASURE IN DOING THINGS: NOT AT ALL
SUM OF ALL RESPONSES TO PHQ QUESTIONS 1-9: 0
SUM OF ALL RESPONSES TO PHQ QUESTIONS 1-9: 0
SUM OF ALL RESPONSES TO PHQ9 QUESTIONS 1 & 2: 0
SUM OF ALL RESPONSES TO PHQ QUESTIONS 1-9: 0
SUM OF ALL RESPONSES TO PHQ QUESTIONS 1-9: 0
2. FEELING DOWN, DEPRESSED OR HOPELESS: NOT AT ALL

## 2024-10-29 NOTE — PROGRESS NOTES
[facility] [place]  Medical Oncology at [place]  [phone]    Hematology / Oncology Consult    Reason for Visit:   Sharri Pruitt is a 44 y.o. female who is seen in consultation at the request of Dr. Munira Omalley for evaluation of iron deficiency anemia.    History of Present Illness:   Sharri Pruitt is a 44 y.o. female who is here for evaluation of iron deficiency anemia. Medical history significant for gastric bypass (2005), anemia, treated hepatitis C, and anxiety.     She is 34 weeks gestation. She reports a longstanding history of iron deficiency. Reports 12/24/23 she had a gastric bypass reversal. Prior to reversal she was requiring parenteral iron infusions several times a year. Reports she was also previously getting B12 injections. She is not currently receiving B12 injections. Reports she is taking a prenatal vitamin that contains iron.     Reports prior to getting pregnant, she had a feeding tube for several months because whenever she tried to eat, she would get sick. She had an EGD and was diagnosed with PUD as potential cause of symptoms. Reports she also required an esophageal dilation. After treatment for PUD, she continued having difficulty eating, therefore had her gastric bypass surgery reversed. Reports since getting pregnant, her appetite has improved.     She denies melena or hematochezia. Denies history of colonoscopy. Reports dizziness with occasional sweating, dyspnea with exertion, and ice cravings. Denies chest pain.     Presents alone today.       Past Medical History:   Diagnosis Date    Anemia     Anxiety     GERD (gastroesophageal reflux disease)     Hepatitis C 2010    treated    History of blood transfusion     SEVERAL TRNASFUSIONS LAST ONE 2021 NO REACTIONS TO ANY INFUSIONS    History of MRSA infection     Hx of abnormal Pap smear     Ill-defined condition     anemia hx, has received several blood transfusion, last blood transfusion May 5 2014    Intestinal

## 2024-10-29 NOTE — PROGRESS NOTES
Chief Complaint   Patient presents with    New Patient           Vitals:    10/29/24 1047   BP: (!) 102/58   Pulse: 95   Resp: 20   Temp: 97.3 °F (36.3 °C)   SpO2: 99%            1. Have you been to the ER, urgent care clinic since your last visit?  Hospitalized since your last visit?  New Patient  2. Have you seen or consulted any other health care providers outside of the [facility] since your last visit?  Include any pap smears or colon screening. New Patient

## 2024-11-01 ENCOUNTER — HOSPITAL ENCOUNTER (OUTPATIENT)
Facility: HOSPITAL | Age: 44
Setting detail: INFUSION SERIES
Discharge: HOME | End: 2024-11-01
Payer: COMMERCIAL

## 2024-11-01 VITALS
RESPIRATION RATE: 16 BRPM | HEART RATE: 76 BPM | DIASTOLIC BLOOD PRESSURE: 52 MMHG | TEMPERATURE: 98 F | HEIGHT: 66 IN | BODY MASS INDEX: 32.31 KG/M2 | OXYGEN SATURATION: 100 % | SYSTOLIC BLOOD PRESSURE: 104 MMHG

## 2024-11-01 DIAGNOSIS — O99.019 IRON DEFICIENCY ANEMIA DURING PREGNANCY: Primary | ICD-10-CM

## 2024-11-01 DIAGNOSIS — D50.9 IRON DEFICIENCY ANEMIA DURING PREGNANCY: Primary | ICD-10-CM

## 2024-11-01 LAB
BASOPHILS # BLD: 0 K/UL (ref 0–0.1)
BASOPHILS NFR BLD: 0 % (ref 0–1)
DIFFERENTIAL METHOD BLD: ABNORMAL
EOSINOPHIL # BLD: 0.1 K/UL (ref 0–0.4)
EOSINOPHIL NFR BLD: 2 % (ref 0–7)
ERYTHROCYTE [DISTWIDTH] IN BLOOD BY AUTOMATED COUNT: 16 % (ref 11.5–14.5)
FOLATE SERPL-MCNC: 44.2 NG/ML (ref 5–21)
HCT VFR BLD AUTO: 29.4 % (ref 35–47)
HGB BLD-MCNC: 9.3 G/DL (ref 11.5–16)
IMM GRANULOCYTES # BLD AUTO: 0 K/UL (ref 0–0.04)
IMM GRANULOCYTES NFR BLD AUTO: 1 % (ref 0–0.5)
IRON SATN MFR SERPL: 25 % (ref 20–50)
IRON SERPL-MCNC: 137 UG/DL (ref 35–150)
LYMPHOCYTES # BLD: 1.2 K/UL (ref 0.8–3.5)
LYMPHOCYTES NFR BLD: 19 % (ref 12–49)
MCH RBC QN AUTO: 25.3 PG (ref 26–34)
MCHC RBC AUTO-ENTMCNC: 31.6 G/DL (ref 30–36.5)
MCV RBC AUTO: 80.1 FL (ref 80–99)
MONOCYTES # BLD: 0.7 K/UL (ref 0–1)
MONOCYTES NFR BLD: 12 % (ref 5–13)
NEUTS SEG # BLD: 4.2 K/UL (ref 1.8–8)
NEUTS SEG NFR BLD: 66 % (ref 32–75)
NRBC # BLD: 0 K/UL (ref 0–0.01)
NRBC BLD-RTO: 0 PER 100 WBC
PLATELET # BLD AUTO: 129 K/UL (ref 150–400)
PMV BLD AUTO: 12.2 FL (ref 8.9–12.9)
RBC # BLD AUTO: 3.67 M/UL (ref 3.8–5.2)
TIBC SERPL-MCNC: 556 UG/DL (ref 250–450)
VIT B12 SERPL-MCNC: 435 PG/ML (ref 193–986)
WBC # BLD AUTO: 6.3 K/UL (ref 3.6–11)

## 2024-11-01 PROCEDURE — 82746 ASSAY OF FOLIC ACID SERUM: CPT

## 2024-11-01 PROCEDURE — 36415 COLL VENOUS BLD VENIPUNCTURE: CPT

## 2024-11-01 PROCEDURE — 85025 COMPLETE CBC W/AUTO DIFF WBC: CPT

## 2024-11-01 PROCEDURE — 6360000002 HC RX W HCPCS: Performed by: NURSE PRACTITIONER

## 2024-11-01 PROCEDURE — 82607 VITAMIN B-12: CPT

## 2024-11-01 PROCEDURE — 83550 IRON BINDING TEST: CPT

## 2024-11-01 PROCEDURE — 83540 ASSAY OF IRON: CPT

## 2024-11-01 PROCEDURE — 96365 THER/PROPH/DIAG IV INF INIT: CPT

## 2024-11-01 PROCEDURE — 2580000003 HC RX 258: Performed by: NURSE PRACTITIONER

## 2024-11-01 RX ORDER — SODIUM CHLORIDE 9 MG/ML
INJECTION, SOLUTION INTRAVENOUS CONTINUOUS
Status: CANCELLED | OUTPATIENT
Start: 2024-11-03

## 2024-11-01 RX ORDER — DIPHENHYDRAMINE HYDROCHLORIDE 50 MG/ML
50 INJECTION INTRAMUSCULAR; INTRAVENOUS
Status: CANCELLED | OUTPATIENT
Start: 2024-11-03

## 2024-11-01 RX ORDER — HEPARIN 100 UNIT/ML
500 SYRINGE INTRAVENOUS PRN
Status: CANCELLED | OUTPATIENT
Start: 2024-11-03

## 2024-11-01 RX ORDER — SODIUM CHLORIDE 0.9 % (FLUSH) 0.9 %
5-40 SYRINGE (ML) INJECTION PRN
Status: CANCELLED | OUTPATIENT
Start: 2024-11-03

## 2024-11-01 RX ORDER — SODIUM CHLORIDE 9 MG/ML
5-250 INJECTION, SOLUTION INTRAVENOUS PRN
Status: CANCELLED | OUTPATIENT
Start: 2024-11-03

## 2024-11-01 RX ORDER — ONDANSETRON 2 MG/ML
8 INJECTION INTRAMUSCULAR; INTRAVENOUS
Status: CANCELLED | OUTPATIENT
Start: 2024-11-03

## 2024-11-01 RX ORDER — EPINEPHRINE 1 MG/ML
0.3 INJECTION, SOLUTION INTRAMUSCULAR; SUBCUTANEOUS PRN
Status: CANCELLED | OUTPATIENT
Start: 2024-11-03

## 2024-11-01 RX ORDER — FAMOTIDINE 10 MG/ML
20 INJECTION, SOLUTION INTRAVENOUS
Status: CANCELLED | OUTPATIENT
Start: 2024-11-03

## 2024-11-01 RX ORDER — ALBUTEROL SULFATE 90 UG/1
4 INHALANT RESPIRATORY (INHALATION) PRN
Status: CANCELLED | OUTPATIENT
Start: 2024-11-03

## 2024-11-01 RX ORDER — ACETAMINOPHEN 325 MG/1
650 TABLET ORAL
Status: CANCELLED | OUTPATIENT
Start: 2024-11-03

## 2024-11-01 RX ADMIN — SODIUM CHLORIDE 200 MG: 9 INJECTION, SOLUTION INTRAVENOUS at 15:47

## 2024-11-01 ASSESSMENT — PAIN SCALES - GENERAL: PAINLEVEL_OUTOF10: 0

## 2024-11-01 NOTE — PROGRESS NOTES
Outpatient Infusion Center Progress Note        Date: 24    Name: Sharri Pruitt    MRN: [id number]         : 1980    MD: Rebecca Kahn, FERMIN *       Ms. Pruitt admitted to [facility] for Day 1 of Venofer Infusion ambulatory in stable condition. Assessment completed, no acute issues at this time. No new concerns voiced.  24 gauge peripheral IV obtained in the LAC without difficulty, line flushed and capped. Labs drawn peripherally and sent for processing.      ** Patient has received this medication in the past. Patient reports no previous reactions to the medication(s).        Vitals:    24 1503 24 1621   BP: (!) 104/54 (!) 104/52   Pulse: 72 76   Resp: 18 16   Temp: 98.1 °F (36.7 °C) 98 °F (36.7 °C)   TempSrc: Temporal Temporal   SpO2: 100% 100%   Height: 1.676 m (5' 6\")        Labs were obtained and reviewed.    Results for orders placed or performed during the hospital encounter of 24   CBC with Auto Differential   Result Value Ref Range    WBC 6.3 3.6 - 11.0 K/uL    RBC 3.67 (L) 3.80 - 5.20 M/uL    Hemoglobin 9.3 (L) 11.5 - 16.0 g/dL    Hematocrit 29.4 (L) 35.0 - 47.0 %    MCV 80.1 80.0 - 99.0 FL    MCH 25.3 (L) 26.0 - 34.0 PG    MCHC 31.6 30.0 - 36.5 g/dL    RDW 16.0 (H) 11.5 - 14.5 %    Platelets 129 (L) 150 - 400 K/uL    MPV 12.2 8.9 - 12.9 FL    Nucleated RBCs 0.0 0  WBC    nRBC 0.00 0.00 - 0.01 K/uL    Neutrophils % 66 32 - 75 %    Lymphocytes % 19 12 - 49 %    Monocytes % 12 5 - 13 %    Eosinophils % 2 0 - 7 %    Basophils % 0 0 - 1 %    Immature Granulocytes % 1 (H) 0.0 - 0.5 %    Neutrophils Absolute 4.2 1.8 - 8.0 K/UL    Lymphocytes Absolute 1.2 0.8 - 3.5 K/UL    Monocytes Absolute 0.7 0.0 - 1.0 K/UL    Eosinophils Absolute 0.1 0.0 - 0.4 K/UL    Basophils Absolute 0.0 0.0 - 0.1 K/UL    Immature Granulocytes Absolute 0.0 0.00 - 0.04 K/UL    Differential Type AUTOMATED               Medications:  MEDICATIONS GIVEN:  Medications Administered         iron sucrose

## 2024-11-04 ENCOUNTER — HOSPITAL ENCOUNTER (EMERGENCY)
Facility: HOSPITAL | Age: 44
Discharge: HOME | End: 2024-11-04
Attending: STUDENT IN AN ORGANIZED HEALTH CARE EDUCATION/TRAINING PROGRAM
Payer: COMMERCIAL

## 2024-11-04 ENCOUNTER — HOSPITAL ENCOUNTER (OUTPATIENT)
Facility: HOSPITAL | Age: 44
Setting detail: INFUSION SERIES
Discharge: HOME | End: 2024-11-04
Payer: COMMERCIAL

## 2024-11-04 ENCOUNTER — TELEPHONE (OUTPATIENT)
Age: 44
End: 2024-11-04

## 2024-11-04 VITALS
DIASTOLIC BLOOD PRESSURE: 60 MMHG | SYSTOLIC BLOOD PRESSURE: 110 MMHG | OXYGEN SATURATION: 98 % | TEMPERATURE: 97.6 F | HEIGHT: 66 IN | HEART RATE: 72 BPM | BODY MASS INDEX: 32.14 KG/M2 | RESPIRATION RATE: 16 BRPM | WEIGHT: 200 LBS

## 2024-11-04 VITALS
SYSTOLIC BLOOD PRESSURE: 122 MMHG | DIASTOLIC BLOOD PRESSURE: 56 MMHG | RESPIRATION RATE: 16 BRPM | OXYGEN SATURATION: 99 % | TEMPERATURE: 97.5 F | HEART RATE: 73 BPM

## 2024-11-04 DIAGNOSIS — D50.9 IRON DEFICIENCY ANEMIA DURING PREGNANCY: Primary | ICD-10-CM

## 2024-11-04 DIAGNOSIS — O99.019 IRON DEFICIENCY ANEMIA DURING PREGNANCY: Primary | ICD-10-CM

## 2024-11-04 DIAGNOSIS — O21.9 NAUSEA AND VOMITING IN PREGNANCY: Primary | ICD-10-CM

## 2024-11-04 LAB
ALBUMIN SERPL-MCNC: 2.6 G/DL (ref 3.5–5)
ALBUMIN/GLOB SERPL: 0.7 (ref 1.1–2.2)
ALP SERPL-CCNC: 105 U/L (ref 45–117)
ALT SERPL-CCNC: 29 U/L (ref 12–78)
ANION GAP SERPL CALC-SCNC: 3 MMOL/L (ref 2–12)
AST SERPL W P-5'-P-CCNC: 21 U/L (ref 15–37)
BASOPHILS # BLD: 0 K/UL (ref 0–0.1)
BASOPHILS NFR BLD: 0 % (ref 0–1)
BILIRUB SERPL-MCNC: 0.3 MG/DL (ref 0.2–1)
BUN SERPL-MCNC: 10 MG/DL (ref 6–20)
BUN/CREAT SERPL: 19 (ref 12–20)
CA-I BLD-MCNC: 8.5 MG/DL (ref 8.5–10.1)
CHLORIDE SERPL-SCNC: 108 MMOL/L (ref 97–108)
CO2 SERPL-SCNC: 26 MMOL/L (ref 21–32)
CREAT SERPL-MCNC: 0.52 MG/DL (ref 0.55–1.02)
DIFFERENTIAL METHOD BLD: ABNORMAL
EOSINOPHIL # BLD: 0.1 K/UL (ref 0–0.4)
EOSINOPHIL NFR BLD: 2 % (ref 0–7)
ERYTHROCYTE [DISTWIDTH] IN BLOOD BY AUTOMATED COUNT: 17.4 % (ref 11.5–14.5)
GLOBULIN SER CALC-MCNC: 3.9 G/DL (ref 2–4)
GLUCOSE SERPL-MCNC: 86 MG/DL (ref 65–100)
HCT VFR BLD AUTO: 32.9 % (ref 35–47)
HGB BLD-MCNC: 10.4 G/DL (ref 11.5–16)
IMM GRANULOCYTES # BLD AUTO: 0 K/UL (ref 0–0.04)
IMM GRANULOCYTES NFR BLD AUTO: 1 % (ref 0–0.5)
LYMPHOCYTES # BLD: 1.4 K/UL (ref 0.8–3.5)
LYMPHOCYTES NFR BLD: 21 % (ref 12–49)
MCH RBC QN AUTO: 25.4 PG (ref 26–34)
MCHC RBC AUTO-ENTMCNC: 31.6 G/DL (ref 30–36.5)
MCV RBC AUTO: 80.4 FL (ref 80–99)
MONOCYTES # BLD: 0.9 K/UL (ref 0–1)
MONOCYTES NFR BLD: 14 % (ref 5–13)
NEUTS SEG # BLD: 4.2 K/UL (ref 1.8–8)
NEUTS SEG NFR BLD: 62 % (ref 32–75)
NRBC # BLD: 0 K/UL (ref 0–0.01)
NRBC BLD-RTO: 0 PER 100 WBC
PLATELET # BLD AUTO: 164 K/UL (ref 150–400)
PMV BLD AUTO: 12.7 FL (ref 8.9–12.9)
POTASSIUM SERPL-SCNC: 4.3 MMOL/L (ref 3.5–5.1)
PROT SERPL-MCNC: 6.5 G/DL (ref 6.4–8.2)
RBC # BLD AUTO: 4.09 M/UL (ref 3.8–5.2)
SODIUM SERPL-SCNC: 137 MMOL/L (ref 136–145)
WBC # BLD AUTO: 6.7 K/UL (ref 3.6–11)

## 2024-11-04 PROCEDURE — 6360000002 HC RX W HCPCS: Performed by: NURSE PRACTITIONER

## 2024-11-04 PROCEDURE — 96375 TX/PRO/DX INJ NEW DRUG ADDON: CPT

## 2024-11-04 PROCEDURE — 80053 COMPREHEN METABOLIC PANEL: CPT

## 2024-11-04 PROCEDURE — 36415 COLL VENOUS BLD VENIPUNCTURE: CPT

## 2024-11-04 PROCEDURE — 85025 COMPLETE CBC W/AUTO DIFF WBC: CPT

## 2024-11-04 PROCEDURE — 99284 EMERGENCY DEPT VISIT MOD MDM: CPT

## 2024-11-04 PROCEDURE — 96365 THER/PROPH/DIAG IV INF INIT: CPT

## 2024-11-04 PROCEDURE — 2580000003 HC RX 258: Performed by: NURSE PRACTITIONER

## 2024-11-04 PROCEDURE — 96374 THER/PROPH/DIAG INJ IV PUSH: CPT

## 2024-11-04 PROCEDURE — 6360000002 HC RX W HCPCS: Performed by: STUDENT IN AN ORGANIZED HEALTH CARE EDUCATION/TRAINING PROGRAM

## 2024-11-04 RX ORDER — HEPARIN 100 UNIT/ML
500 SYRINGE INTRAVENOUS PRN
Status: CANCELLED | OUTPATIENT
Start: 2024-11-05

## 2024-11-04 RX ORDER — ONDANSETRON 4 MG/1
4 TABLET, ORALLY DISINTEGRATING ORAL 3 TIMES DAILY PRN
Qty: 21 TABLET | Refills: 0 | Status: SHIPPED | OUTPATIENT
Start: 2024-11-04

## 2024-11-04 RX ORDER — DOXYLAMINE SUCCINATE AND PYRIDOXINE HYDROCHLORIDE, DELAYED RELEASE TABLETS 10 MG/10 MG 10; 10 MG/1; MG/1
1 TABLET, DELAYED RELEASE ORAL NIGHTLY
Qty: 30 TABLET | Refills: 1 | Status: SHIPPED | OUTPATIENT
Start: 2024-11-04

## 2024-11-04 RX ORDER — EPINEPHRINE 1 MG/ML
0.3 INJECTION, SOLUTION INTRAMUSCULAR; SUBCUTANEOUS PRN
Status: CANCELLED | OUTPATIENT
Start: 2024-11-05

## 2024-11-04 RX ORDER — ONDANSETRON 2 MG/ML
4 INJECTION INTRAMUSCULAR; INTRAVENOUS ONCE
Status: DISCONTINUED | OUTPATIENT
Start: 2024-11-04 | End: 2024-11-04

## 2024-11-04 RX ORDER — FAMOTIDINE 10 MG/ML
20 INJECTION, SOLUTION INTRAVENOUS
Status: CANCELLED | OUTPATIENT
Start: 2024-11-05

## 2024-11-04 RX ORDER — SODIUM CHLORIDE 0.9 % (FLUSH) 0.9 %
5-40 SYRINGE (ML) INJECTION PRN
Status: CANCELLED | OUTPATIENT
Start: 2024-11-05

## 2024-11-04 RX ORDER — SODIUM CHLORIDE 9 MG/ML
INJECTION, SOLUTION INTRAVENOUS CONTINUOUS
Status: CANCELLED | OUTPATIENT
Start: 2024-11-05

## 2024-11-04 RX ORDER — SODIUM CHLORIDE 9 MG/ML
5-250 INJECTION, SOLUTION INTRAVENOUS PRN
Status: CANCELLED | OUTPATIENT
Start: 2024-11-05

## 2024-11-04 RX ORDER — ONDANSETRON 4 MG/1
4 TABLET, ORALLY DISINTEGRATING ORAL EVERY 30 MIN PRN
Status: DISCONTINUED | OUTPATIENT
Start: 2024-11-04 | End: 2024-11-05 | Stop reason: HOSPADM

## 2024-11-04 RX ORDER — ONDANSETRON 2 MG/ML
8 INJECTION INTRAMUSCULAR; INTRAVENOUS
Status: CANCELLED | OUTPATIENT
Start: 2024-11-05

## 2024-11-04 RX ORDER — ONDANSETRON 2 MG/ML
4 INJECTION INTRAMUSCULAR; INTRAVENOUS ONCE
Status: COMPLETED | OUTPATIENT
Start: 2024-11-04 | End: 2024-11-04

## 2024-11-04 RX ORDER — ONDANSETRON 2 MG/ML
4 INJECTION INTRAMUSCULAR; INTRAVENOUS ONCE
Status: DISCONTINUED | OUTPATIENT
Start: 2024-11-04 | End: 2024-11-05 | Stop reason: HOSPADM

## 2024-11-04 RX ORDER — DIPHENHYDRAMINE HYDROCHLORIDE 50 MG/ML
50 INJECTION INTRAMUSCULAR; INTRAVENOUS
Status: CANCELLED | OUTPATIENT
Start: 2024-11-05

## 2024-11-04 RX ORDER — ACETAMINOPHEN 325 MG/1
650 TABLET ORAL
Status: CANCELLED | OUTPATIENT
Start: 2024-11-05

## 2024-11-04 RX ORDER — METOCLOPRAMIDE HYDROCHLORIDE 5 MG/ML
10 INJECTION INTRAMUSCULAR; INTRAVENOUS ONCE
Status: COMPLETED | OUTPATIENT
Start: 2024-11-04 | End: 2024-11-04

## 2024-11-04 RX ORDER — ALBUTEROL SULFATE 90 UG/1
4 INHALANT RESPIRATORY (INHALATION) PRN
Status: CANCELLED | OUTPATIENT
Start: 2024-11-05

## 2024-11-04 RX ADMIN — ONDANSETRON 4 MG: 2 INJECTION INTRAMUSCULAR; INTRAVENOUS at 19:54

## 2024-11-04 RX ADMIN — IRON SUCROSE 200 MG: 20 INJECTION, SOLUTION INTRAVENOUS at 16:44

## 2024-11-04 RX ADMIN — METOCLOPRAMIDE 10 MG: 5 INJECTION, SOLUTION INTRAMUSCULAR; INTRAVENOUS at 20:33

## 2024-11-04 ASSESSMENT — PAIN SCALES - GENERAL
PAINLEVEL_OUTOF10: 0
PAINLEVEL_OUTOF10: 0

## 2024-11-04 ASSESSMENT — PAIN - FUNCTIONAL ASSESSMENT: PAIN_FUNCTIONAL_ASSESSMENT: 0-10

## 2024-11-04 NOTE — TELEPHONE ENCOUNTER
11/04/24 3:23 PM Received message from [facility] RN that patient called [facility] to inquire if IV fluids could be ordered with iron infusion this afternoon. Attempted to call patient back via home number listed. No answer, left message requesting call back. Provided office phone number as well.      3:30 PM Received return call from patient. Verified patient ID x 2. Patient states that since she hasn't been eating ice, she feels as if she has not had as much intake. Patient notes feeling sluggish and dry. Patient shared that in the past her OBGYN or surgeon have ordered fluids. Discussed current IV fluid shortage. Encouraged patient to push PO fluids by drinking more water, Gatorade, or Pedialyte. Otherwise, advised that patient can contact OBGYN or surgeon if she feels she needs fluids. Unable to order from hematology standpoint. Patient voiced understanding and in agreement with plan.

## 2024-11-04 NOTE — PROGRESS NOTES
[facility] Progress Note    Date: 2024    Name: Sharri Pruitt    MRN: [id number]         : 1980    Ms. Pruitt Arrived ambulatory and in no distress for Venofer Infusion.  Assessment was completed, no acute issues at this time, no new complaints voiced.  24 G PIV established to Left arm, + blood return.     Ms. Pruitt's vitals were reviewed.  Vitals:    24 1615   BP: (!) 122/56   Pulse: 73   Resp: 16   Temp: 97.5 °F (36.4 °C)   SpO2: 99%       Lab results were obtained and reviewed on  for patient.      Medications:  Medications Administered         iron sucrose (VENOFER) 200 mg in sodium chloride 0.9 % 100 mL IVPB Admin Date  2024 Action  New Bag Dose  200 mg Rate  240 mL/hr Route  IntraVENous Documented By  Fozia Gaviria, RN             Ms. Pruitt tolerated treatment well and was discharged from Outpatient [facility]fusion [facility] in stable condition.  24G PIV flushed & removed. Patient is aware of future appointments.    Future Appointments   Date Time Provider Department Center   2024  3:00 PM SS FASTTRACK 1 [facility]   2024 11:30 AM Munira Omalley MD [facility]   2024  3:30 PM SS FASTTRACK 3 [facility]   2024  3:00 PM SS FASTTRACK 1 [facility]   2024  1:00 PM ULTRASOUND 3 [facility]   2024 11:30 AM Munira Omalley MD [facility]   2025  1:30 PM Kimberlee Gipson MD ONCSF [facility]       Fozia Gaviria, KATHIE  2024

## 2024-11-05 ENCOUNTER — TELEPHONE (OUTPATIENT)
Age: 44
End: 2024-11-05

## 2024-11-05 DIAGNOSIS — D69.6 THROMBOCYTOPENIA (HCC): ICD-10-CM

## 2024-11-05 DIAGNOSIS — O99.019 IRON DEFICIENCY ANEMIA DURING PREGNANCY: Primary | ICD-10-CM

## 2024-11-05 DIAGNOSIS — D50.9 IRON DEFICIENCY ANEMIA DURING PREGNANCY: Primary | ICD-10-CM

## 2024-11-05 NOTE — ED TRIAGE NOTES
GCS 15 pt stated that she has been vomiting all day today without any ABD pains; pt stated that she has to have iron infusions Q2 days d/t her pregnancy; pt stated that she was told by the cancer center where she gets her infusions to come here for further evaluation

## 2024-11-05 NOTE — TELEPHONE ENCOUNTER
11/05/24 1:11 PM Called pt to review lab results. Left VM to return call. Advised I will also release a result note.     11/05/24 2:17 PM   Patient returned call. Reviewed lab results. Normal B12/folate. Plt count mildly low, recommend recheck in 2 weeks. She verbalized understanding.

## 2024-11-06 ENCOUNTER — HOSPITAL ENCOUNTER (OUTPATIENT)
Facility: HOSPITAL | Age: 44
Setting detail: INFUSION SERIES
Discharge: HOME | End: 2024-11-06
Payer: COMMERCIAL

## 2024-11-06 VITALS
OXYGEN SATURATION: 97 % | DIASTOLIC BLOOD PRESSURE: 61 MMHG | HEART RATE: 84 BPM | TEMPERATURE: 98.3 F | RESPIRATION RATE: 18 BRPM | SYSTOLIC BLOOD PRESSURE: 101 MMHG

## 2024-11-06 DIAGNOSIS — O99.019 IRON DEFICIENCY ANEMIA DURING PREGNANCY: Primary | ICD-10-CM

## 2024-11-06 DIAGNOSIS — D50.9 IRON DEFICIENCY ANEMIA DURING PREGNANCY: Primary | ICD-10-CM

## 2024-11-06 PROCEDURE — 96365 THER/PROPH/DIAG IV INF INIT: CPT

## 2024-11-06 PROCEDURE — 2580000003 HC RX 258: Performed by: NURSE PRACTITIONER

## 2024-11-06 PROCEDURE — 6360000002 HC RX W HCPCS: Performed by: NURSE PRACTITIONER

## 2024-11-06 RX ORDER — SODIUM CHLORIDE 9 MG/ML
INJECTION, SOLUTION INTRAVENOUS CONTINUOUS
Status: CANCELLED | OUTPATIENT
Start: 2024-11-08

## 2024-11-06 RX ORDER — FAMOTIDINE 10 MG/ML
20 INJECTION, SOLUTION INTRAVENOUS
Status: CANCELLED | OUTPATIENT
Start: 2024-11-08

## 2024-11-06 RX ORDER — SODIUM CHLORIDE 9 MG/ML
5-250 INJECTION, SOLUTION INTRAVENOUS PRN
Status: CANCELLED | OUTPATIENT
Start: 2024-11-08

## 2024-11-06 RX ORDER — SODIUM CHLORIDE 0.9 % (FLUSH) 0.9 %
5-40 SYRINGE (ML) INJECTION PRN
Status: CANCELLED | OUTPATIENT
Start: 2024-11-08

## 2024-11-06 RX ORDER — ACETAMINOPHEN 325 MG/1
650 TABLET ORAL
Status: CANCELLED | OUTPATIENT
Start: 2024-11-08

## 2024-11-06 RX ORDER — DIPHENHYDRAMINE HYDROCHLORIDE 50 MG/ML
50 INJECTION INTRAMUSCULAR; INTRAVENOUS
Status: CANCELLED | OUTPATIENT
Start: 2024-11-08

## 2024-11-06 RX ORDER — EPINEPHRINE 1 MG/ML
0.3 INJECTION, SOLUTION INTRAMUSCULAR; SUBCUTANEOUS PRN
Status: CANCELLED | OUTPATIENT
Start: 2024-11-08

## 2024-11-06 RX ORDER — ONDANSETRON 2 MG/ML
8 INJECTION INTRAMUSCULAR; INTRAVENOUS
Status: CANCELLED | OUTPATIENT
Start: 2024-11-08

## 2024-11-06 RX ORDER — HEPARIN 100 UNIT/ML
500 SYRINGE INTRAVENOUS PRN
Status: CANCELLED | OUTPATIENT
Start: 2024-11-08

## 2024-11-06 RX ORDER — ALBUTEROL SULFATE 90 UG/1
4 INHALANT RESPIRATORY (INHALATION) PRN
Status: CANCELLED | OUTPATIENT
Start: 2024-11-08

## 2024-11-06 RX ADMIN — IRON SUCROSE 200 MG: 20 INJECTION, SOLUTION INTRAVENOUS at 15:24

## 2024-11-06 ASSESSMENT — PAIN SCALES - GENERAL: PAINLEVEL_OUTOF10: 0

## 2024-11-06 NOTE — ED PROVIDER NOTES
[phone]   doxylamine-pyridoxine 10-10 MG Tbec  ondansetron 4 MG disintegrating tablet           DISCONTINUED MEDICATIONS:  Discharge Medication List as of 11/4/2024 10:09 PM          I am the Primary Clinician of Record. John Arora MD (electronically signed)    (Please note that parts of this dictation were completed with voice recognition software. Quite often unanticipated grammatical, syntax, homophones, and other interpretive errors are inadvertently transcribed by the computer software. Please disregards these errors. Please excuse any errors that have escaped final proofreading.)     John Arora MD  11/06/24 4013

## 2024-11-06 NOTE — PROGRESS NOTES
[facility] Progress Note    Date: 2024    Name: Sharri Pruitt    MRN: [id number]         : 1980    Ms. Pruitt Arrived ambulatory and in no distress for Venofer Infusion.  Assessment was completed, no acute issues at this time, no new complaints voiced.  24 G PIV established to left arm, + blood return.    Ms. Pruitt's vitals were reviewed.  Vitals:    24 1559   BP: 101/61   Pulse: 84   Resp: 18   Temp:    SpO2:      Medications:  Medications Administered         iron sucrose (VENOFER) 200 mg in sodium chloride 0.9 % 100 mL IVPB Admin Date  2024 Action  New Bag Dose  200 mg Rate  240 mL/hr Route  IntraVENous Documented By  Alba Lemus, KATHIE             Ms. Pruitt tolerated treatment well and was discharged from [facility]patient [facility]fusion Center in stable condition.   PIV flushed & removed. Patient is aware of future appointments.    Future Appointments   Date Time Provider Department Center   2024 11:30 AM Munira Omalley MD [facility]   2024  3:30 PM SS FASTTRACK 3 [facility]   2024  3:00 PM SS FASTTRACK 1 [facility]   2024  1:00 PM ULTRASOUND 3 [facility]   2024 11:30 AM Munira Omalley MD [facility]   2025  1:30 PM Kimberlee Gipson MD ONCSF [facility]       Alba Lemus, KATHIE  2024

## 2024-11-07 ENCOUNTER — PATIENT MESSAGE (OUTPATIENT)
Age: 44
End: 2024-11-07

## 2024-11-07 DIAGNOSIS — D50.9 IRON DEFICIENCY ANEMIA DURING PREGNANCY: Primary | ICD-10-CM

## 2024-11-07 DIAGNOSIS — O99.019 IRON DEFICIENCY ANEMIA DURING PREGNANCY: Primary | ICD-10-CM

## 2024-11-08 ENCOUNTER — ROUTINE PRENATAL (OUTPATIENT)
Age: 44
End: 2024-11-08

## 2024-11-08 VITALS
SYSTOLIC BLOOD PRESSURE: 113 MMHG | DIASTOLIC BLOOD PRESSURE: 73 MMHG | WEIGHT: 202 LBS | BODY MASS INDEX: 32.6 KG/M2 | HEART RATE: 75 BPM

## 2024-11-08 DIAGNOSIS — O09.90 SUPERVISION OF HIGH RISK PREGNANCY, ANTEPARTUM: Primary | ICD-10-CM

## 2024-11-08 NOTE — PROGRESS NOTES
43yo  at 35w2d here for MILADY. Difficulty with swelling. S/p 4/5 IV iron infusions, doing well. No ob complaints. RTC in 1w. Has MFM appt next week.

## 2024-11-11 ENCOUNTER — HOSPITAL ENCOUNTER (OUTPATIENT)
Facility: HOSPITAL | Age: 44
Setting detail: INFUSION SERIES
Discharge: HOME | End: 2024-11-11
Payer: COMMERCIAL

## 2024-11-11 VITALS
HEART RATE: 90 BPM | RESPIRATION RATE: 16 BRPM | TEMPERATURE: 98.2 F | SYSTOLIC BLOOD PRESSURE: 101 MMHG | OXYGEN SATURATION: 94 % | DIASTOLIC BLOOD PRESSURE: 58 MMHG

## 2024-11-11 DIAGNOSIS — O99.019 IRON DEFICIENCY ANEMIA DURING PREGNANCY: Primary | ICD-10-CM

## 2024-11-11 DIAGNOSIS — D50.9 IRON DEFICIENCY ANEMIA DURING PREGNANCY: Primary | ICD-10-CM

## 2024-11-11 PROCEDURE — 6360000002 HC RX W HCPCS: Performed by: NURSE PRACTITIONER

## 2024-11-11 PROCEDURE — 2580000003 HC RX 258: Performed by: NURSE PRACTITIONER

## 2024-11-11 PROCEDURE — 96365 THER/PROPH/DIAG IV INF INIT: CPT

## 2024-11-11 RX ORDER — SODIUM CHLORIDE 0.9 % (FLUSH) 0.9 %
5-40 SYRINGE (ML) INJECTION PRN
Status: DISCONTINUED | OUTPATIENT
Start: 2024-11-11 | End: 2024-11-12 | Stop reason: HOSPADM

## 2024-11-11 RX ORDER — ACETAMINOPHEN 325 MG/1
650 TABLET ORAL
Status: CANCELLED | OUTPATIENT
Start: 2024-11-12

## 2024-11-11 RX ORDER — FAMOTIDINE 10 MG/ML
20 INJECTION, SOLUTION INTRAVENOUS
Status: CANCELLED | OUTPATIENT
Start: 2024-11-12

## 2024-11-11 RX ORDER — EPINEPHRINE 1 MG/ML
0.3 INJECTION, SOLUTION INTRAMUSCULAR; SUBCUTANEOUS PRN
Status: CANCELLED | OUTPATIENT
Start: 2024-11-12

## 2024-11-11 RX ORDER — SODIUM CHLORIDE 0.9 % (FLUSH) 0.9 %
5-40 SYRINGE (ML) INJECTION PRN
Status: CANCELLED | OUTPATIENT
Start: 2024-11-12

## 2024-11-11 RX ORDER — HEPARIN 100 UNIT/ML
500 SYRINGE INTRAVENOUS PRN
Status: CANCELLED | OUTPATIENT
Start: 2024-11-12

## 2024-11-11 RX ORDER — ALBUTEROL SULFATE 90 UG/1
4 INHALANT RESPIRATORY (INHALATION) PRN
Status: CANCELLED | OUTPATIENT
Start: 2024-11-12

## 2024-11-11 RX ORDER — ONDANSETRON 2 MG/ML
8 INJECTION INTRAMUSCULAR; INTRAVENOUS
Status: CANCELLED | OUTPATIENT
Start: 2024-11-12

## 2024-11-11 RX ORDER — DIPHENHYDRAMINE HYDROCHLORIDE 50 MG/ML
50 INJECTION INTRAMUSCULAR; INTRAVENOUS
Status: CANCELLED | OUTPATIENT
Start: 2024-11-12

## 2024-11-11 RX ORDER — SODIUM CHLORIDE 9 MG/ML
5-250 INJECTION, SOLUTION INTRAVENOUS PRN
Status: CANCELLED | OUTPATIENT
Start: 2024-11-12

## 2024-11-11 RX ORDER — HYDROCORTISONE SODIUM SUCCINATE 100 MG/2ML
100 INJECTION INTRAMUSCULAR; INTRAVENOUS
Status: CANCELLED | OUTPATIENT
Start: 2024-11-12

## 2024-11-11 RX ORDER — SODIUM CHLORIDE 9 MG/ML
INJECTION, SOLUTION INTRAVENOUS CONTINUOUS
Status: CANCELLED | OUTPATIENT
Start: 2024-11-12

## 2024-11-11 RX ADMIN — IRON SUCROSE 200 MG: 20 INJECTION, SOLUTION INTRAVENOUS at 15:38

## 2024-11-11 ASSESSMENT — PAIN SCALES - GENERAL: PAINLEVEL_OUTOF10: 0

## 2024-11-11 NOTE — PROGRESS NOTES
Outpatient Infusion Center Short Visit Progress Note    Ms. Pruitt admitted to [facility] for Venofer # 4 of 5 ambulatory in stable condition. Assessment completed. No new concerns voiced.     Vital Signs:  /75   Pulse 84   Temp 98.2 °F (36.8 °C) (Temporal)   Resp 18   LMP 11/10/2023   SpO2 94%       Left arm PIV with positive blood return.       Medications:  Medications Administered         iron sucrose (VENOFER) 200 mg in sodium chloride 0.9 % 100 mL IVPB Admin Date  11/11/2024 Action  New Bag Dose  200 mg Rate  240 mL/hr Route  IntraVENous Documented By  Gisela Barr, RN              Patient tolerated treatment well. Patient discharged from [facility]patient [facility]fusion Center ambulatory in no distress. Patient aware of next appointment. Patient to make next apt with  as she missed one dose of iron.    Gisela Barr RN  November 11, 2024    Future Appointments   Date Time Provider Department Center   11/13/2024  1:00 PM ULTRASOUND 3 [facility]   11/21/2024 11:30 AM Munira Omalley MD BSR[facility]   12/5/2024  3:30 PM Lolly Plasencia MD [facility]SR[facility]G [facility]   12/6/2024  7:00 AM Munira Omalley MD [facility]   1/24/2025  1:30 PM Kimberlee Gipson MD ONCSF [facility] AMB

## 2024-11-12 ENCOUNTER — TELEPHONE (OUTPATIENT)
Age: 44
End: 2024-11-12

## 2024-11-13 ENCOUNTER — ROUTINE PRENATAL (OUTPATIENT)
Age: 44
End: 2024-11-13
Payer: COMMERCIAL

## 2024-11-13 ENCOUNTER — APPOINTMENT (OUTPATIENT)
Facility: HOSPITAL | Age: 44
End: 2024-11-13
Payer: COMMERCIAL

## 2024-11-13 ENCOUNTER — HOSPITAL ENCOUNTER (OUTPATIENT)
Facility: HOSPITAL | Age: 44
Setting detail: INFUSION SERIES
Discharge: HOME | End: 2024-11-13
Payer: COMMERCIAL

## 2024-11-13 VITALS
SYSTOLIC BLOOD PRESSURE: 108 MMHG | RESPIRATION RATE: 16 BRPM | TEMPERATURE: 97.5 F | DIASTOLIC BLOOD PRESSURE: 53 MMHG | HEART RATE: 79 BPM

## 2024-11-13 VITALS — DIASTOLIC BLOOD PRESSURE: 62 MMHG | SYSTOLIC BLOOD PRESSURE: 102 MMHG | HEART RATE: 83 BPM

## 2024-11-13 DIAGNOSIS — D50.9 IRON DEFICIENCY ANEMIA DURING PREGNANCY: Primary | ICD-10-CM

## 2024-11-13 DIAGNOSIS — O99.019 IRON DEFICIENCY ANEMIA DURING PREGNANCY: Primary | ICD-10-CM

## 2024-11-13 DIAGNOSIS — Z3A.36 36 WEEKS GESTATION OF PREGNANCY: Primary | ICD-10-CM

## 2024-11-13 PROCEDURE — 76818 FETAL BIOPHYS PROFILE W/NST: CPT | Performed by: STUDENT IN AN ORGANIZED HEALTH CARE EDUCATION/TRAINING PROGRAM

## 2024-11-13 PROCEDURE — 6360000002 HC RX W HCPCS: Performed by: NURSE PRACTITIONER

## 2024-11-13 PROCEDURE — 99214 OFFICE O/P EST MOD 30 MIN: CPT | Performed by: STUDENT IN AN ORGANIZED HEALTH CARE EDUCATION/TRAINING PROGRAM

## 2024-11-13 PROCEDURE — 96365 THER/PROPH/DIAG IV INF INIT: CPT

## 2024-11-13 PROCEDURE — 76816 OB US FOLLOW-UP PER FETUS: CPT | Performed by: STUDENT IN AN ORGANIZED HEALTH CARE EDUCATION/TRAINING PROGRAM

## 2024-11-13 PROCEDURE — 2580000003 HC RX 258: Performed by: NURSE PRACTITIONER

## 2024-11-13 RX ORDER — ALBUTEROL SULFATE 90 UG/1
4 INHALANT RESPIRATORY (INHALATION) PRN
OUTPATIENT
Start: 2024-11-13

## 2024-11-13 RX ORDER — FAMOTIDINE 10 MG/ML
20 INJECTION, SOLUTION INTRAVENOUS
OUTPATIENT
Start: 2024-11-13

## 2024-11-13 RX ORDER — ONDANSETRON 2 MG/ML
8 INJECTION INTRAMUSCULAR; INTRAVENOUS
OUTPATIENT
Start: 2024-11-13

## 2024-11-13 RX ORDER — EPINEPHRINE 1 MG/ML
0.3 INJECTION, SOLUTION INTRAMUSCULAR; SUBCUTANEOUS PRN
OUTPATIENT
Start: 2024-11-13

## 2024-11-13 RX ORDER — DIPHENHYDRAMINE HYDROCHLORIDE 50 MG/ML
50 INJECTION INTRAMUSCULAR; INTRAVENOUS
OUTPATIENT
Start: 2024-11-13

## 2024-11-13 RX ORDER — SODIUM CHLORIDE 9 MG/ML
5-250 INJECTION, SOLUTION INTRAVENOUS PRN
OUTPATIENT
Start: 2024-11-13

## 2024-11-13 RX ORDER — HYDROCORTISONE SODIUM SUCCINATE 100 MG/2ML
100 INJECTION INTRAMUSCULAR; INTRAVENOUS
OUTPATIENT
Start: 2024-11-13

## 2024-11-13 RX ORDER — HEPARIN 100 UNIT/ML
500 SYRINGE INTRAVENOUS PRN
OUTPATIENT
Start: 2024-11-13

## 2024-11-13 RX ORDER — ACETAMINOPHEN 325 MG/1
650 TABLET ORAL
OUTPATIENT
Start: 2024-11-13

## 2024-11-13 RX ORDER — SODIUM CHLORIDE 0.9 % (FLUSH) 0.9 %
5-40 SYRINGE (ML) INJECTION PRN
OUTPATIENT
Start: 2024-11-13

## 2024-11-13 RX ORDER — SODIUM CHLORIDE 9 MG/ML
INJECTION, SOLUTION INTRAVENOUS CONTINUOUS
OUTPATIENT
Start: 2024-11-13

## 2024-11-13 RX ADMIN — IRON SUCROSE 200 MG: 20 INJECTION, SOLUTION INTRAVENOUS at 16:21

## 2024-11-13 ASSESSMENT — PAIN SCALES - GENERAL: PAINLEVEL_OUTOF10: 0

## 2024-11-13 NOTE — PROGRESS NOTES
Outpatient Infusion Center   Visit Progress Note    [facility] in stable condition. Assessment completed.      No new concerns voiced.    VS  Vitals:    11/13/24 1559 11/13/24 1656   BP: 109/63 (!) 108/53   Pulse: 64 79   Resp: 16    Temp: 97.5 °F (36.4 °C)          LAC with positive blood return.     Medications:  Medications Administered         iron sucrose (VENOFER) 200 mg in sodium chloride 0.9 % 100 mL IVPB Admin Date  11/13/2024 Action  New Bag Dose  200 mg Rate  240 mL/hr Route  IntraVENous Documented By  Joana Bernal, RN            Patient tolerated treatment well. Patient discharged from Outpatient Infusion Center in no distress. Patient aware of next appointment.    No labs were ordered/drawn this visit.

## 2024-11-13 NOTE — PROGRESS NOTES
Patient was seen 11/13/2024      Please look under media to view full consult and ultrasound report in View[employer].         Shireen Wagner MD   Maternal Fetal Medicine

## 2024-11-13 NOTE — PROCEDURES
PATIENT: STEVE CORONADO   -  : 1980   -  DOS:2024   -  INTERPRETING PROVIDER:Shireen Wagner,   Indication  ========    AMA, Grand multiparity, history of bariatric surgery and referral, history of severe anemia requiring blood transfusions and intravenous iron infusions, advanced maternal  age. SMA carrier. Alpha thalassemia silent carrier. Advanced paternal age.    Method  ======    Transabdominal ultrasound examination. View: Suboptimal view: limited by late gestational age    Pregnancy  =========    Morales pregnancy, morales pregnancy. Number of fetuses: 1    Dating  ======    Previous Ultrasound on: 2024  Type of prior assessment: GA  GA at prior assessment date 8 w + 0 d  GA by previous U/S 36 w + 0 d  DANAY by previous Ultrasound: 2024  Ultrasound examination on: 2024  GA by U/S based upon: AC, BPD, Femur, HC  GA by U/S 37 w + 5 d  DANAY by U/S: 2024  Assigned: based on ultrasound (GA), selected on 2024  Assigned GA 36 w + 0 d  Assigned DANAY: 2024    Fetal Biometry  ============    Standard  BPD 95.5 mm 39w 0d >99% Hadlock  .2 mm -/- >99% Shan  .3 mm 39w 6d 95% Hadlock  .0 mm 34w 6d 27% Hadlock  Femur 72.3 mm 37w 0d 72% Hadlock  EFW 2,923 g 36w 4d 62% Hadlock  EFW (lb) 6 lb  EFW (oz) 7 oz  EFW by: Hadlock (BPD-HC-AC-FL)  Other Structures   bpm    General Evaluation  ==============    Cardiac activity present.  bpm. Fetal movements: visualized. Presentation: Cephalic  Placenta: Placental site: anterior, appropriate distance from the internal os  Umbilical cord: Cord vessels: 3 vessel cord. Insertion site: central  Amniotic fluid: Amount of AF: normal. MVP 3.8 cm. MERI 13.0 cm. Q1 3.8 cm, Q2 3.1 cm, Q3 3.3 cm, Q4 2.8 cm    Fetal Anatomy  ===========    Stomach: normal  Kidneys: normal  Bladder: normal  Wants to know fetal sex: yes    Biophysical Profile  ==============    0: Fetal breathing movements  2: Gross body movements  2:

## 2024-11-18 ENCOUNTER — TELEPHONE (OUTPATIENT)
Age: 44
End: 2024-11-18

## 2024-11-18 ENCOUNTER — ROUTINE PRENATAL (OUTPATIENT)
Age: 44
End: 2024-11-18
Payer: COMMERCIAL

## 2024-11-18 VITALS
DIASTOLIC BLOOD PRESSURE: 68 MMHG | HEART RATE: 96 BPM | BODY MASS INDEX: 32.93 KG/M2 | WEIGHT: 204 LBS | SYSTOLIC BLOOD PRESSURE: 100 MMHG

## 2024-11-18 DIAGNOSIS — O09.90 SUPERVISION OF HIGH RISK PREGNANCY, ANTEPARTUM: Primary | ICD-10-CM

## 2024-11-18 PROCEDURE — 99212 OFFICE O/P EST SF 10 MIN: CPT | Performed by: STUDENT IN AN ORGANIZED HEALTH CARE EDUCATION/TRAINING PROGRAM

## 2024-11-18 NOTE — TELEPHONE ENCOUNTER
Two patient identifiers used    44 year old patient  36w5d pregnant .    Patient denies vaginal bleeding,ROM,and is having contractions ever 30 minutes since last night with some prior to that.    Patient is feeling the baby move.    Patient reports the contractions are moderate in pain and have not really intensified.    Patient reports she has very high pain tolerance        Per Dr. Omalley patient was advised to come to the office to be evaluated . Patient was placed on the schedule to be seen at 3:00 pm    Patient verbalized understanding.

## 2024-11-18 NOTE — PROGRESS NOTES
45yo  at 36w5d here for contractions. Q30m. No bleeding, leakage of fluid. Normal fetal movement.  Cervix 2/L/H. Patient appears comfortable. Offered observation, elects to leave with return precautions. RTC as scheduled in 3d.

## 2024-11-19 ENCOUNTER — TELEPHONE (OUTPATIENT)
Age: 44
End: 2024-11-19

## 2024-11-19 NOTE — TELEPHONE ENCOUNTER
11/19/24 4:11 PM Called pt and advised she does not need to repeat labs given her PLT count normalized on most recent lab draw. She verbalized understanding.

## 2024-11-21 ENCOUNTER — TELEPHONE (OUTPATIENT)
Age: 44
End: 2024-11-21

## 2024-11-21 ENCOUNTER — ROUTINE PRENATAL (OUTPATIENT)
Age: 44
End: 2024-11-21
Payer: COMMERCIAL

## 2024-11-21 ENCOUNTER — ROUTINE PRENATAL (OUTPATIENT)
Age: 44
End: 2024-11-21

## 2024-11-21 ENCOUNTER — PATIENT MESSAGE (OUTPATIENT)
Age: 44
End: 2024-11-21

## 2024-11-21 VITALS
BODY MASS INDEX: 33.09 KG/M2 | WEIGHT: 205 LBS | DIASTOLIC BLOOD PRESSURE: 73 MMHG | HEART RATE: 75 BPM | SYSTOLIC BLOOD PRESSURE: 112 MMHG

## 2024-11-21 VITALS — SYSTOLIC BLOOD PRESSURE: 99 MMHG | DIASTOLIC BLOOD PRESSURE: 67 MMHG | HEART RATE: 71 BPM

## 2024-11-21 DIAGNOSIS — O09.90 SUPERVISION OF HIGH RISK PREGNANCY, ANTEPARTUM: ICD-10-CM

## 2024-11-21 DIAGNOSIS — O99.019 IRON DEFICIENCY ANEMIA DURING PREGNANCY: ICD-10-CM

## 2024-11-21 DIAGNOSIS — Z11.3 SCREENING FOR VENEREAL DISEASE: ICD-10-CM

## 2024-11-21 DIAGNOSIS — O09.90 SUPERVISION OF HIGH RISK PREGNANCY, ANTEPARTUM: Primary | ICD-10-CM

## 2024-11-21 DIAGNOSIS — D50.9 IRON DEFICIENCY ANEMIA DURING PREGNANCY: ICD-10-CM

## 2024-11-21 DIAGNOSIS — Z36.85 ANTENATAL SCREENING FOR STREPTOCOCCUS B: ICD-10-CM

## 2024-11-21 DIAGNOSIS — Z36.9 ENCOUNTER FOR ANTENATAL SCREENING OF MOTHER: ICD-10-CM

## 2024-11-21 LAB — GBS, EXTERNAL RESULT: NEGATIVE

## 2024-11-21 PROCEDURE — 0502F SUBSEQUENT PRENATAL CARE: CPT | Performed by: STUDENT IN AN ORGANIZED HEALTH CARE EDUCATION/TRAINING PROGRAM

## 2024-11-21 PROCEDURE — 76819 FETAL BIOPHYS PROFIL W/O NST: CPT | Performed by: OBSTETRICS & GYNECOLOGY

## 2024-11-21 NOTE — PROGRESS NOTES
45yo  at 37w1d here for MILADY. Contractions irregular. Cervix unchanged from Monday. GBS/nuswab today. S/p iron infusions. RTC in 1w.

## 2024-11-21 NOTE — TELEPHONE ENCOUNTER
44 year old patient  37w1d pregnant    Requested prescription last sent on  for 90  tabs      Please review pended medication    Thank you

## 2024-11-21 NOTE — PROCEDURES
PATIENT: STEVE CORONADO   -  : 1980   -  DOS:2024   -  INTERPRETING PROVIDER:Aleyda Evans,   Indication  ========    AMA, Grand multiparity, history of bariatric surgery and referral, history of severe anemia requiring blood transfusions and intravenous iron infusions, advanced maternal  age. SMA carrier. Alpha thalassemia silent carrier. Advanced paternal age.    Method  ======    Transabdominal ultrasound examination. View: Suboptimal view: limited by late gestational age    Pregnancy  =========    Patel pregnancy, patel pregnancy. Number of fetuses: 1    Dating  ======    Previous Ultrasound on: 2024  Type of prior assessment: GA  GA at prior assessment date 8 w + 0 d  GA by previous U/S 37 w + 1 d  DANAY by previous Ultrasound: 2024  Assigned: based on ultrasound (GA), selected on 2024  Assigned GA 37 w + 1 d  Assigned DANAY: 2024    General Evaluation  ==============    Cardiac activity present.  bpm. Fetal movements: visualized. Presentation: Cephalic  Placenta: Placental site: anterior, appropriate distance from the internal os  Umbilical cord: Cord vessels: 3 vessel cord    Fetal Anatomy  ===========    Stomach: normal  Kidneys: normal  Bladder: normal  Wants to know fetal sex: yes    Amniotic Fluid Assessment  =====================    Amount of AF: normal  MVP 3.7 cm. MERI 9.7 cm. Q1 3.4 cm, Q2 2.5 cm, Q3 0.0 cm, Q4 3.7 cm    Biophysical Profile  ==============    2: Fetal breathing movements  2: Gross body movements  2: Fetal tone  2: Amniotic fluid volume  8/8 Biophysical profile score    Findings  =======    Intrauterine Patel pregnancy, patel pregnancy at 37w 1d by clinical dates.  Amniotic fluid: normal.  Placenta is anterior, appropriate distance from the internal os.  Biophysical profile score is 8/8.  Cephalic presentation.    Consultation  ==========    37w1d for testing.    BPP is reassuring with a score of 8/8 and normal

## 2024-11-22 RX ORDER — FERROUS SULFATE 325(65) MG
325 TABLET ORAL DAILY
Qty: 90 TABLET | Refills: 1 | Status: SHIPPED | OUTPATIENT
Start: 2024-11-22

## 2024-11-22 RX ORDER — DEXTROMETHORPHAN HBR. AND GUAIFENESIN 10; 100 MG/5ML; MG/5ML
10 SOLUTION ORAL EVERY 4 HOURS PRN
Qty: 180 ML | Refills: 0 | Status: SHIPPED | OUTPATIENT
Start: 2024-11-22

## 2024-11-22 RX ORDER — ASPIRIN 81 MG/1
81 TABLET ORAL DAILY
Qty: 90 TABLET | Refills: 0 | Status: SHIPPED | OUTPATIENT
Start: 2024-11-22

## 2024-11-24 LAB
GP B STREP DNA SPEC QL NAA+PROBE: NEGATIVE
SPECIMEN SOURCE: NORMAL

## 2024-11-25 ENCOUNTER — ROUTINE PRENATAL (OUTPATIENT)
Age: 44
End: 2024-11-25

## 2024-11-25 ENCOUNTER — ROUTINE PRENATAL (OUTPATIENT)
Age: 44
End: 2024-11-25
Payer: COMMERCIAL

## 2024-11-25 VITALS
SYSTOLIC BLOOD PRESSURE: 115 MMHG | HEART RATE: 95 BPM | BODY MASS INDEX: 32.28 KG/M2 | WEIGHT: 200 LBS | DIASTOLIC BLOOD PRESSURE: 61 MMHG

## 2024-11-25 VITALS — SYSTOLIC BLOOD PRESSURE: 117 MMHG | HEART RATE: 94 BPM | DIASTOLIC BLOOD PRESSURE: 59 MMHG

## 2024-11-25 DIAGNOSIS — Z14.8 GENETIC CARRIER STATUS: ICD-10-CM

## 2024-11-25 DIAGNOSIS — O99.013 ANEMIA AFFECTING PREGNANCY IN THIRD TRIMESTER: ICD-10-CM

## 2024-11-25 DIAGNOSIS — O09.529 ANTEPARTUM MULTIGRAVIDA OF ADVANCED MATERNAL AGE: Primary | ICD-10-CM

## 2024-11-25 DIAGNOSIS — Z98.84 HX OF BARIATRIC SURGERY: ICD-10-CM

## 2024-11-25 DIAGNOSIS — O09.529 ANTEPARTUM MULTIGRAVIDA OF ADVANCED MATERNAL AGE: ICD-10-CM

## 2024-11-25 DIAGNOSIS — O09.90 SUPERVISION OF HIGH RISK PREGNANCY, ANTEPARTUM: Primary | ICD-10-CM

## 2024-11-25 DIAGNOSIS — O09.43 GRAND MULTIPARITY WITH CURRENT PREGNANCY, ANTEPARTUM, THIRD TRIMESTER: ICD-10-CM

## 2024-11-25 DIAGNOSIS — O99.019 IRON DEFICIENCY ANEMIA DURING PREGNANCY: Primary | ICD-10-CM

## 2024-11-25 DIAGNOSIS — D50.9 IRON DEFICIENCY ANEMIA DURING PREGNANCY: Primary | ICD-10-CM

## 2024-11-25 PROCEDURE — 76819 FETAL BIOPHYS PROFIL W/O NST: CPT | Performed by: STUDENT IN AN ORGANIZED HEALTH CARE EDUCATION/TRAINING PROGRAM

## 2024-11-25 PROCEDURE — 0502F SUBSEQUENT PRENATAL CARE: CPT | Performed by: STUDENT IN AN ORGANIZED HEALTH CARE EDUCATION/TRAINING PROGRAM

## 2024-11-25 PROCEDURE — 99213 OFFICE O/P EST LOW 20 MIN: CPT

## 2024-11-25 NOTE — PROGRESS NOTES
Patient was seen 11/25/2024      Please look under media to view full consult and ultrasound report in ViewPoint.         Shireen Wagner MD   Maternal Fetal Medicine

## 2024-11-25 NOTE — PROGRESS NOTES
Assessment & Plan   ASSESSMENT/PLAN:  1. Antepartum multigravida of advanced maternal age  2. Hx of bariatric surgery  3. Anemia affecting pregnancy in third trimester  4. Genetic carrier status  5. Grand multiparity with current pregnancy, antepartum, third trimester    Blayne is a 43 y/o  seen for a pregnancy complicated by:    AMA/Advanced paternal age.   -LR NIPT   -Declined diagnostic testing previously.   -Reviewed reassuring BPP 8/8 and normal MERI 16.6cm  -continue daily LDA.   -continue weekly testing.   -Growth Q4 weeks.   -Delivery recommended by 39 weeks.   -Kick count instructions, PIH and PTL precautions reviewed.      History of gastric bypass/bariatric surgery and reversal.  -GDM ruled out.      Anemia/History of severe anemia   -Requiring blood transfusions and intravenous iron infusions  -S/p Fe infusions  -Taking daily Fe supplement     SMA carrier/Alpha thalassemia silent carrier  -Declined GC   -FOB neg      UNITY anti Reese   -NIPT negative for reese antigen     Grand multiparity    Recommendations   Continue weekly ANT  Cervical Ripening 24-IOL scheduled 24    Patient images have been reviewed. Agree with the plan of care as outlined above.   Dr. Wagner    Please see Viewpoint for ultrasound findings.     Cherry Pruitt (:  1980) is a 44 y.o. female,Established patient, here for evaluation of the following chief complaint(s):  1. Antepartum multigravida of advanced maternal age  2. Hx of bariatric surgery  3. Anemia affecting pregnancy in third trimester  4. Genetic carrier status  5. Grand multiparity with current pregnancy, antepartum, third trimester    Objective   Physical Exam  Vitals reviewed.   Constitutional:       Appearance: Normal appearance.   Neurological:      Mental Status: She is alert.   Psychiatric:         Mood and Affect: Mood normal.         Judgment: Judgment normal.       On this date 2024 I have spent 25 minutes reviewing

## 2024-11-25 NOTE — PROCEDURES
PATIENT: STEVE CORONADO   -  : 1980   -  DOS:2024   -  INTERPRETING PROVIDER:Shireen Wagner,   Indication  ========    AMA, Grand multiparity, history of bariatric surgery, history of severe anemia requiring blood transfusions and intravenous iron infusions, advanced maternal age. SMA  carrier. Alpha thalassemia silent carrier. Advanced paternal age.    Method  ======    Transabdominal ultrasound examination. View: Sufficient    Pregnancy  =========    Morales pregnancy, morales pregnancy. Number of fetuses: 1    Dating  ======    Previous Ultrasound on: 2024  Type of prior assessment: GA  GA at prior assessment date 8 w + 0 d  GA by previous U/S 37 w + 5 d  DANAY by previous Ultrasound: 2024  Assigned: based on ultrasound (GA), selected on 2024  Assigned GA 37 w + 5 d  Assigned DANAY: 2024    General Evaluation  ==============    Cardiac activity present.  bpm. Fetal movements: visualized. Presentation: Cephalic  Placenta: Placental site: anterior, appropriate distance from the internal os  Umbilical cord: Cord vessels: 3 vessel cord    Fetal Anatomy  ===========    Stomach: normal  Kidneys: normal  Bladder: normal  Wants to know fetal sex: yes    Amniotic Fluid Assessment  =====================    Amount of AF: normal  MVP 6.5 cm. MERI 16.6 cm. Q1 6.5 cm, Q2 3.5 cm, Q3 2.8 cm, Q4 3.8 cm    Biophysical Profile  ==============    2: Fetal breathing movements  2: Gross body movements  2: Fetal tone  2: Amniotic fluid volume  8/8 Biophysical profile score    Findings  =======    Intrauterine Morales pregnancy, morales pregnancy at 37w 5d by clinical dates.  Amniotic fluid: normal.  Placenta is anterior, appropriate distance from the internal os.  Biophysical profile score is 8/8.  Cephalic presentation.    Consultation  ==========    NP note 24    Blayne is a 45 y/o  seen for a pregnancy complicated by:    AMA/Advanced paternal age.  -LR NIPT  -Declined

## 2024-11-25 NOTE — PROGRESS NOTES
43yo  at 37w5d here for MILADY. No complaints. Cervix unchanged. Reviewed results from term labs, GBS negative. Reviewed expectations for IOL and plan. Given precautions.

## 2024-11-26 LAB
C TRACH RRNA SPEC QL NAA+PROBE: NEGATIVE
N GONORRHOEA RRNA SPEC QL NAA+PROBE: NEGATIVE
SPECIMEN STATUS REPORT: NORMAL
T VAGINALIS RRNA SPEC QL NAA+PROBE: NEGATIVE

## 2024-12-02 ENCOUNTER — ROUTINE PRENATAL (OUTPATIENT)
Age: 44
End: 2024-12-02
Payer: COMMERCIAL

## 2024-12-02 VITALS — SYSTOLIC BLOOD PRESSURE: 112 MMHG | DIASTOLIC BLOOD PRESSURE: 60 MMHG | HEART RATE: 72 BPM

## 2024-12-02 DIAGNOSIS — Z98.84 HISTORY OF ROUX-EN-Y GASTRIC BYPASS: Primary | ICD-10-CM

## 2024-12-02 PROCEDURE — 99214 OFFICE O/P EST MOD 30 MIN: CPT | Performed by: STUDENT IN AN ORGANIZED HEALTH CARE EDUCATION/TRAINING PROGRAM

## 2024-12-02 PROCEDURE — 59025 FETAL NON-STRESS TEST: CPT | Performed by: STUDENT IN AN ORGANIZED HEALTH CARE EDUCATION/TRAINING PROGRAM

## 2024-12-02 PROCEDURE — 76815 OB US LIMITED FETUS(S): CPT | Performed by: STUDENT IN AN ORGANIZED HEALTH CARE EDUCATION/TRAINING PROGRAM

## 2024-12-02 NOTE — PROCEDURES
PATIENT: STEVE CORONADO   -  : 1980   -  DOS:2024   -  INTERPRETING PROVIDER:Shireen Wagner,   Indication  ========    AMA, Grand multiparity, history of bariatric surgery, history of severe anemia requiring blood transfusions and intravenous iron infusions, advanced maternal age. SMA  carrier. Alpha thalassemia silent carrier. Advanced paternal age.    Method  ======    External Fetal Monitor and transabdominal ultrasound examination. View: Sufficient    Pregnancy  =========    Patel pregnancy. Number of fetuses: 1    Dating  ======    Previous Ultrasound on: 2024  Type of prior assessment: GA  GA at prior assessment date 8 w + 0 d  GA by previous U/S 38 w + 5 d  DANAY by previous Ultrasound: 2024  Assigned: based on ultrasound (GA), selected on 2024  Assigned GA 38 w + 5 d  Assigned DANAY: 2024    General Evaluation  ==============    Cardiac activity present.  bpm. Fetal movements: visualized. Presentation: Cephalic  Placenta: Placental site: anterior, appropriate distance from the internal os  Umbilical cord: Cord vessels: 3 vessel cord    Fetal Anatomy  ===========    Stomach: normal  Kidneys: normal  Bladder: normal  Wants to know fetal sex: yes    Amniotic Fluid Assessment  =====================    Amount of AF: normal  MVP 4.2 cm. MERI 10.8 cm. Q1 4.0 cm, Q2 2.7 cm, Q3 0.0 cm, Q4 4.2 cm    Non Stress Test  =============    NST interpretation: reactive. Test duration 20 min. Baseline  bpm. Baseline variability: moderate. Accelerations: present. Decelerations: absent. Uterine activity:  absent    Findings  =======    Intrauterine Patel pregnancy at 38w 5d by clinical dates.  Amniotic fluid: normal.  Placenta is anterior, appropriate distance from the internal os.  Cephalic presentation.    NST is reactive. Modified BPP reassuring.    Consultation  ==========    Malenamor is a 45 y/o  seen for a pregnancy complicated by:    AMA/Advanced paternal

## 2024-12-02 NOTE — PROGRESS NOTES
Patient was seen 12/2/2024      Please look under media to view full consult and ultrasound report in ViewPoint.         Shireen Wagner MD   Maternal Fetal Medicine

## 2024-12-05 ENCOUNTER — ROUTINE PRENATAL (OUTPATIENT)
Age: 44
End: 2024-12-05

## 2024-12-05 ENCOUNTER — TELEPHONE (OUTPATIENT)
Age: 44
End: 2024-12-05

## 2024-12-05 ENCOUNTER — HOSPITAL ENCOUNTER (INPATIENT)
Facility: HOSPITAL | Age: 44
LOS: 3 days | Discharge: HOME | End: 2024-12-08
Attending: STUDENT IN AN ORGANIZED HEALTH CARE EDUCATION/TRAINING PROGRAM | Admitting: OBSTETRICS & GYNECOLOGY
Payer: COMMERCIAL

## 2024-12-05 VITALS
BODY MASS INDEX: 32.6 KG/M2 | SYSTOLIC BLOOD PRESSURE: 117 MMHG | DIASTOLIC BLOOD PRESSURE: 68 MMHG | WEIGHT: 202 LBS | HEART RATE: 88 BPM

## 2024-12-05 DIAGNOSIS — Z34.90 PREGNANCY, UNSPECIFIED GESTATIONAL AGE: Primary | ICD-10-CM

## 2024-12-05 DIAGNOSIS — O09.90 SUPERVISION OF HIGH RISK PREGNANCY, ANTEPARTUM: ICD-10-CM

## 2024-12-05 LAB
BASOPHILS # BLD: 0 K/UL (ref 0–0.1)
BASOPHILS NFR BLD: 0 % (ref 0–1)
DIFFERENTIAL METHOD BLD: ABNORMAL
EOSINOPHIL # BLD: 0.1 K/UL (ref 0–0.4)
EOSINOPHIL NFR BLD: 1 % (ref 0–7)
ERYTHROCYTE [DISTWIDTH] IN BLOOD BY AUTOMATED COUNT: 20.8 % (ref 11.5–14.5)
HCT VFR BLD AUTO: 36.1 % (ref 35–47)
HGB BLD-MCNC: 11.7 G/DL (ref 11.5–16)
HISTORY CHECK: NORMAL
IMM GRANULOCYTES # BLD AUTO: 0 K/UL (ref 0–0.04)
IMM GRANULOCYTES NFR BLD AUTO: 0 % (ref 0–0.5)
LYMPHOCYTES # BLD: 1.3 K/UL (ref 0.8–3.5)
LYMPHOCYTES NFR BLD: 24 % (ref 12–49)
MCH RBC QN AUTO: 26.2 PG (ref 26–34)
MCHC RBC AUTO-ENTMCNC: 32.4 G/DL (ref 30–36.5)
MCV RBC AUTO: 80.8 FL (ref 80–99)
MONOCYTES # BLD: 0.6 K/UL (ref 0–1)
MONOCYTES NFR BLD: 12 % (ref 5–13)
NEUTS SEG # BLD: 3.4 K/UL (ref 1.8–8)
NEUTS SEG NFR BLD: 63 % (ref 32–75)
NRBC # BLD: 0 K/UL (ref 0–0.01)
NRBC BLD-RTO: 0 PER 100 WBC
PLATELET # BLD AUTO: 133 K/UL (ref 150–400)
RBC # BLD AUTO: 4.47 M/UL (ref 3.8–5.2)
RBC MORPH BLD: ABNORMAL
WBC # BLD AUTO: 5.4 K/UL (ref 3.6–11)

## 2024-12-05 PROCEDURE — 86850 RBC ANTIBODY SCREEN: CPT

## 2024-12-05 PROCEDURE — 86920 COMPATIBILITY TEST SPIN: CPT

## 2024-12-05 PROCEDURE — 36415 COLL VENOUS BLD VENIPUNCTURE: CPT

## 2024-12-05 PROCEDURE — 59200 INSERT CERVICAL DILATOR: CPT | Performed by: STUDENT IN AN ORGANIZED HEALTH CARE EDUCATION/TRAINING PROGRAM

## 2024-12-05 PROCEDURE — 1100000000 HC RM PRIVATE

## 2024-12-05 PROCEDURE — 7210000100 HC LABOR FEE PER 1 HR: Performed by: STUDENT IN AN ORGANIZED HEALTH CARE EDUCATION/TRAINING PROGRAM

## 2024-12-05 PROCEDURE — 86902 BLOOD TYPE ANTIGEN DONOR EA: CPT

## 2024-12-05 PROCEDURE — 86901 BLOOD TYPING SEROLOGIC RH(D): CPT

## 2024-12-05 PROCEDURE — 86870 RBC ANTIBODY IDENTIFICATION: CPT

## 2024-12-05 PROCEDURE — 86922 COMPATIBILITY TEST ANTIGLOB: CPT

## 2024-12-05 PROCEDURE — 86921 COMPATIBILITY TEST INCUBATE: CPT

## 2024-12-05 PROCEDURE — 86780 TREPONEMA PALLIDUM: CPT

## 2024-12-05 PROCEDURE — 86900 BLOOD TYPING SEROLOGIC ABO: CPT

## 2024-12-05 PROCEDURE — 85025 COMPLETE CBC W/AUTO DIFF WBC: CPT

## 2024-12-05 PROCEDURE — 6370000000 HC RX 637 (ALT 250 FOR IP): Performed by: OBSTETRICS & GYNECOLOGY

## 2024-12-05 PROCEDURE — 0502F SUBSEQUENT PRENATAL CARE: CPT | Performed by: OBSTETRICS & GYNECOLOGY

## 2024-12-05 RX ORDER — MISOPROSTOL 200 UG/1
400 TABLET ORAL PRN
Status: DISCONTINUED | OUTPATIENT
Start: 2024-12-05 | End: 2024-12-08 | Stop reason: HOSPADM

## 2024-12-05 RX ORDER — SODIUM CHLORIDE, SODIUM LACTATE, POTASSIUM CHLORIDE, CALCIUM CHLORIDE 600; 310; 30; 20 MG/100ML; MG/100ML; MG/100ML; MG/100ML
INJECTION, SOLUTION INTRAVENOUS CONTINUOUS
Status: DISCONTINUED | OUTPATIENT
Start: 2024-12-05 | End: 2024-12-08 | Stop reason: HOSPADM

## 2024-12-05 RX ORDER — CARBOPROST TROMETHAMINE 250 UG/ML
250 INJECTION, SOLUTION INTRAMUSCULAR PRN
Status: DISCONTINUED | OUTPATIENT
Start: 2024-12-05 | End: 2024-12-08 | Stop reason: HOSPADM

## 2024-12-05 RX ORDER — SODIUM CHLORIDE 0.9 % (FLUSH) 0.9 %
5-40 SYRINGE (ML) INJECTION PRN
Status: DISCONTINUED | OUTPATIENT
Start: 2024-12-05 | End: 2024-12-08 | Stop reason: HOSPADM

## 2024-12-05 RX ORDER — TERBUTALINE SULFATE 1 MG/ML
0.25 INJECTION, SOLUTION SUBCUTANEOUS
Status: ACTIVE | OUTPATIENT
Start: 2024-12-05 | End: 2024-12-06

## 2024-12-05 RX ORDER — SODIUM CHLORIDE 0.9 % (FLUSH) 0.9 %
5-40 SYRINGE (ML) INJECTION EVERY 12 HOURS SCHEDULED
Status: DISCONTINUED | OUTPATIENT
Start: 2024-12-05 | End: 2024-12-08 | Stop reason: HOSPADM

## 2024-12-05 RX ORDER — SODIUM CHLORIDE 9 MG/ML
INJECTION, SOLUTION INTRAVENOUS PRN
Status: DISCONTINUED | OUTPATIENT
Start: 2024-12-05 | End: 2024-12-08 | Stop reason: HOSPADM

## 2024-12-05 RX ORDER — METHYLERGONOVINE MALEATE 0.2 MG/ML
200 INJECTION INTRAVENOUS PRN
Status: DISCONTINUED | OUTPATIENT
Start: 2024-12-05 | End: 2024-12-08 | Stop reason: HOSPADM

## 2024-12-05 RX ORDER — SODIUM CHLORIDE, SODIUM LACTATE, POTASSIUM CHLORIDE, AND CALCIUM CHLORIDE .6; .31; .03; .02 G/100ML; G/100ML; G/100ML; G/100ML
1000 INJECTION, SOLUTION INTRAVENOUS PRN
Status: DISCONTINUED | OUTPATIENT
Start: 2024-12-05 | End: 2024-12-08 | Stop reason: HOSPADM

## 2024-12-05 RX ORDER — TRANEXAMIC ACID 10 MG/ML
1000 INJECTION, SOLUTION INTRAVENOUS
Status: ACTIVE | OUTPATIENT
Start: 2024-12-05 | End: 2024-12-06

## 2024-12-05 RX ORDER — ONDANSETRON 4 MG/1
4 TABLET, ORALLY DISINTEGRATING ORAL EVERY 6 HOURS PRN
Status: DISCONTINUED | OUTPATIENT
Start: 2024-12-05 | End: 2024-12-08 | Stop reason: HOSPADM

## 2024-12-05 RX ORDER — ONDANSETRON 2 MG/ML
4 INJECTION INTRAMUSCULAR; INTRAVENOUS EVERY 6 HOURS PRN
Status: DISCONTINUED | OUTPATIENT
Start: 2024-12-05 | End: 2024-12-08 | Stop reason: HOSPADM

## 2024-12-05 RX ORDER — SODIUM CHLORIDE, SODIUM LACTATE, POTASSIUM CHLORIDE, AND CALCIUM CHLORIDE .6; .31; .03; .02 G/100ML; G/100ML; G/100ML; G/100ML
500 INJECTION, SOLUTION INTRAVENOUS PRN
Status: DISCONTINUED | OUTPATIENT
Start: 2024-12-05 | End: 2024-12-08 | Stop reason: HOSPADM

## 2024-12-05 RX ORDER — DOCUSATE SODIUM 100 MG/1
100 CAPSULE, LIQUID FILLED ORAL 2 TIMES DAILY
Status: DISCONTINUED | OUTPATIENT
Start: 2024-12-05 | End: 2024-12-08 | Stop reason: HOSPADM

## 2024-12-05 RX ADMIN — Medication 25 MCG: at 21:41

## 2024-12-05 RX ADMIN — Medication 25 MCG: at 18:04

## 2024-12-05 NOTE — PROGRESS NOTES
PT of Dr. Omalley.  For IOL tomorrow, cook today.  Cook placed, 80/60.  To L&D.  Plan miso overnight, pit at 0400.  Will T&C d/t anemia, +Marely Ab (baby negative).

## 2024-12-05 NOTE — PROGRESS NOTES
1600 Arrived to unit for IOL for AMA. Cook catheter placed in OB office prior to arrival (80U/60V).    1620 Efm placed for tracing. Denies H/A, visual disturbances, N/V or epigastric pain. Scar to abdomen due to previous gastric bypass surgery with placement and removal of gastric feeding tube. Denies vaginal bleeding or leakage of fluid. Trace edema to bilateral lower extremities. Labor education provided.
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
## 2024-12-05 NOTE — TELEPHONE ENCOUNTER
Two patient identifiers used    44 year old patient  39w1d pregnant calling from the waiting room.    Patient has already checked in for her 3:30 pm appointment for Pires bulb insertion.      Patient calling to ask how long will she have to wait.    Patient was advised that her appointment is at the end of the day so she can go from the office to labor and delivery and labor and delivery is able to be ready for her .        Patient verbalized understanding and had no further questions.

## 2024-12-06 PROCEDURE — 6370000000 HC RX 637 (ALT 250 FOR IP): Performed by: OBSTETRICS & GYNECOLOGY

## 2024-12-06 PROCEDURE — 2580000003 HC RX 258: Performed by: OBSTETRICS & GYNECOLOGY

## 2024-12-06 PROCEDURE — 59400 OBSTETRICAL CARE: CPT | Performed by: STUDENT IN AN ORGANIZED HEALTH CARE EDUCATION/TRAINING PROGRAM

## 2024-12-06 PROCEDURE — 1120000000 HC RM PRIVATE OB

## 2024-12-06 PROCEDURE — 6370000000 HC RX 637 (ALT 250 FOR IP): Performed by: STUDENT IN AN ORGANIZED HEALTH CARE EDUCATION/TRAINING PROGRAM

## 2024-12-06 PROCEDURE — 7210000100 HC LABOR FEE PER 1 HR: Performed by: STUDENT IN AN ORGANIZED HEALTH CARE EDUCATION/TRAINING PROGRAM

## 2024-12-06 PROCEDURE — 7220000101 HC DELIVERY VAGINAL/SINGLE: Performed by: STUDENT IN AN ORGANIZED HEALTH CARE EDUCATION/TRAINING PROGRAM

## 2024-12-06 PROCEDURE — 6360000002 HC RX W HCPCS: Performed by: OBSTETRICS & GYNECOLOGY

## 2024-12-06 RX ORDER — IBUPROFEN 800 MG/1
800 TABLET, FILM COATED ORAL EVERY 8 HOURS SCHEDULED
Status: DISCONTINUED | OUTPATIENT
Start: 2024-12-06 | End: 2024-12-08 | Stop reason: HOSPADM

## 2024-12-06 RX ORDER — IBUPROFEN 800 MG/1
800 TABLET, FILM COATED ORAL EVERY 8 HOURS SCHEDULED
Status: DISCONTINUED | OUTPATIENT
Start: 2024-12-06 | End: 2024-12-06

## 2024-12-06 RX ORDER — ONDANSETRON 4 MG/1
4 TABLET, ORALLY DISINTEGRATING ORAL EVERY 6 HOURS PRN
Status: DISCONTINUED | OUTPATIENT
Start: 2024-12-06 | End: 2024-12-08 | Stop reason: HOSPADM

## 2024-12-06 RX ORDER — SODIUM CHLORIDE 9 MG/ML
INJECTION, SOLUTION INTRAVENOUS PRN
Status: DISCONTINUED | OUTPATIENT
Start: 2024-12-06 | End: 2024-12-08 | Stop reason: HOSPADM

## 2024-12-06 RX ORDER — SODIUM CHLORIDE 0.9 % (FLUSH) 0.9 %
5-40 SYRINGE (ML) INJECTION EVERY 12 HOURS SCHEDULED
Status: DISCONTINUED | OUTPATIENT
Start: 2024-12-06 | End: 2024-12-08 | Stop reason: HOSPADM

## 2024-12-06 RX ORDER — METHYLERGONOVINE MALEATE 0.2 MG/ML
200 INJECTION INTRAVENOUS PRN
Status: DISCONTINUED | OUTPATIENT
Start: 2024-12-06 | End: 2024-12-08 | Stop reason: HOSPADM

## 2024-12-06 RX ORDER — ONDANSETRON 2 MG/ML
4 INJECTION INTRAMUSCULAR; INTRAVENOUS EVERY 6 HOURS PRN
Status: DISCONTINUED | OUTPATIENT
Start: 2024-12-06 | End: 2024-12-08 | Stop reason: HOSPADM

## 2024-12-06 RX ORDER — HYDROMORPHONE HYDROCHLORIDE 2 MG/1
1 TABLET ORAL EVERY 4 HOURS PRN
Status: DISPENSED | OUTPATIENT
Start: 2024-12-06 | End: 2024-12-07

## 2024-12-06 RX ORDER — ACETAMINOPHEN 500 MG
1000 TABLET ORAL EVERY 8 HOURS SCHEDULED
Status: DISCONTINUED | OUTPATIENT
Start: 2024-12-06 | End: 2024-12-06

## 2024-12-06 RX ORDER — MISOPROSTOL 200 UG/1
800 TABLET ORAL PRN
Status: DISCONTINUED | OUTPATIENT
Start: 2024-12-06 | End: 2024-12-08 | Stop reason: HOSPADM

## 2024-12-06 RX ORDER — CARBOPROST TROMETHAMINE 250 UG/ML
250 INJECTION, SOLUTION INTRAMUSCULAR PRN
Status: DISCONTINUED | OUTPATIENT
Start: 2024-12-06 | End: 2024-12-08 | Stop reason: HOSPADM

## 2024-12-06 RX ORDER — TRANEXAMIC ACID 10 MG/ML
1000 INJECTION, SOLUTION INTRAVENOUS
Status: ACTIVE | OUTPATIENT
Start: 2024-12-06 | End: 2024-12-07

## 2024-12-06 RX ORDER — CALCIUM CARBONATE 500 MG/1
1000 TABLET, CHEWABLE ORAL ONCE
Status: COMPLETED | OUTPATIENT
Start: 2024-12-06 | End: 2024-12-06

## 2024-12-06 RX ORDER — SODIUM CHLORIDE 0.9 % (FLUSH) 0.9 %
5-40 SYRINGE (ML) INJECTION PRN
Status: DISCONTINUED | OUTPATIENT
Start: 2024-12-06 | End: 2024-12-08 | Stop reason: HOSPADM

## 2024-12-06 RX ADMIN — ANTACID TABLETS 1000 MG: 500 TABLET, CHEWABLE ORAL at 06:48

## 2024-12-06 RX ADMIN — Medication 166.7 ML: at 09:21

## 2024-12-06 RX ADMIN — SODIUM CHLORIDE, POTASSIUM CHLORIDE, SODIUM LACTATE AND CALCIUM CHLORIDE: 600; 310; 30; 20 INJECTION, SOLUTION INTRAVENOUS at 04:03

## 2024-12-06 RX ADMIN — HYDROMORPHONE HYDROCHLORIDE 1 MG: 2 TABLET ORAL at 18:27

## 2024-12-06 RX ADMIN — HYDROMORPHONE HYDROCHLORIDE 1 MG: 2 TABLET ORAL at 22:24

## 2024-12-06 RX ADMIN — OXYTOCIN 1 MILLI-UNITS/MIN: 10 INJECTION, SOLUTION INTRAMUSCULAR; INTRAVENOUS at 04:04

## 2024-12-06 RX ADMIN — IBUPROFEN 800 MG: 800 TABLET, FILM COATED ORAL at 10:23

## 2024-12-06 RX ADMIN — OXYTOCIN 87.3 MILLI-UNITS/MIN: 10 INJECTION, SOLUTION INTRAMUSCULAR; INTRAVENOUS at 09:36

## 2024-12-06 RX ADMIN — HYDROMORPHONE HYDROCHLORIDE 1 MG: 2 TABLET ORAL at 12:31

## 2024-12-06 ASSESSMENT — PAIN DESCRIPTION - LOCATION
LOCATION: VAGINA;ABDOMEN
LOCATION: PERINEUM
LOCATION: VAGINA
LOCATION: ABDOMEN

## 2024-12-06 ASSESSMENT — PAIN SCALES - GENERAL
PAINLEVEL_OUTOF10: 8
PAINLEVEL_OUTOF10: 7

## 2024-12-06 ASSESSMENT — PAIN DESCRIPTION - ORIENTATION
ORIENTATION: LOWER

## 2024-12-06 ASSESSMENT — PAIN DESCRIPTION - DESCRIPTORS
DESCRIPTORS: CRAMPING
DESCRIPTORS: SORE

## 2024-12-06 NOTE — PROGRESS NOTES
0817-Md at bedside, sve, arom, mec  0855-Pt feeling urge to push with contractions josselinee complete MD called to bedside  0905-MD at bedside  Patient actively pushing.  RN remains in continuous attendance at the bedside.  Assessment & evaluation of fetal heart rate ongoing via continuous EFM.   0917-RN remained at bedside throughout pushing.  EFM continuously assessed.  Vaginal delivery of viable infant.  0930-Vaginal pp recovery begins  1130-PP recovery ends  1330-Pt up, voided, Pericare taught and performed

## 2024-12-06 NOTE — L&D DELIVERY NOTE
Javier Pruitt [[id number]]      Labor Events     Labor: No   Steroids: None  Cervical Ripening Date/Time:      Antibiotics Received during Labor: No  Rupture Identifier: Sac 1  Rupture Date/Time:  24 08:19:00   Rupture Type: AROM  Fluid Color: Meconium  Meconium Consistency: Moderate  Fluid Odor: None  Fluid Volume: Moderate  Induction: Pires Bulb (Balloon)  Augmentation: AROM, Oxytocin  Labor Complications: Meconium at birth       Anesthesia    Method: None       Labor Event Times      Labor onset date/time:        Dilation complete date/time:  24 09:05:00     Start pushing date/time:  2024 09:06:00   Decision date/time (emergent ):            Labor Length    2nd stage: 0h 12m  3rd stage: 0h 04m       Delivery Details      Delivery Date: 24 Delivery Time: 09:17:00   Delivery Type: Vaginal, Spontaneous              [place] Presentation    Presentation: Vertex  Position: Right  _: Occiput  _: Anterior       Shoulder Dystocia    Shoulder Dystocia Present?: No       Assisted Delivery Details    Forceps Attempted?: No  Vacuum Extractor Attempted?: No                           Cord    Vessels: 3 Vessels  Complications: None  Delayed Cord Clamping?: Yes  Cord Clamped Date/Time: 2024 09:18:00  Cord Blood Disposition: Lab  Gases Sent?: No              Placenta    Date/Time: 2024 09:21:05  Removal: Expressed  Appearance: Intact  Disposition: Discarded       Lacerations    Episiotomy: None  Perineal Lacerations: None  Other Lacerations: no non-perineal laceration  Number of Repair Packets: 0       Vaginal Counts    Initial Count Personnel: BECK VÁZQUEZ  Initial Count Verified By: SHARLA ROSENBAUM  Intial Sponge Count: Correct  Intial Instruments Count: Correct   Final Sponges Count: Correct  Final Instruments Count: Correct   Final Count Personnel: SHARLA ROSENBAUM  Final Count Verified By: PEPITO VÁZQUEZ  Accurate Final Count?: Yes       Blood Loss  Mother: Sharri Pruitt R #[id number]     Start

## 2024-12-06 NOTE — PROGRESS NOTES
1900: Bedside report received from Sumi VÁZQUEZ, POC discussed, patient care assumed at this time.     2255: Discussed FHR decels including late/prolonged resolved with repositioning. FHR now cat 1. MD stated to give 0000 dose if FHR and ctx permit.     0605: Reviewed FHR of entire night with Dr. Macias including occasional late decels, FHR overall reassuring.    0700: Bedside report given to Annie VÁZQUEZ and Pat VÁZQUEZ, POC discussed, lines checked, patient care transferred at this time.

## 2024-12-06 NOTE — LACTATION NOTE
This note was copied from a baby's chart.  Mother reports that breast feeding is going well. She is nursing on demand or every 2-3 hours, open to supplementation if needed. Her goal is to exclusively breast feed, therefore she was encouraged to nurse frequently. A few breast feeding basics discussed.     Discussed with mother her plan for feeding.  Reviewed the benefits of exclusive breast milk feeding during the hospital stay.   Informed her of the risks of using formula to supplement in the first few days of life as well as the benefits of successful breast milk feeding; referred her to the Breastfeeding booklet about this information.   She acknowledges understanding of information reviewed and states that it is her plan to breast and formula feed her infant.  Will support her choice and offer additional information as needed.      Pt will successfully establish breastfeeding by feeding in response to early feeding cues   or wake every 3h, will obtain deep latch, and will keep log of feedings/output.  Taught to BF at hunger cues and or q 2-3 hrs and to offer 10-20 drops of hand expressed colostrum at any non-feeds.      Left Breast: Soft  Left Nipple: Protrude  Right Nipple: Protrude  Right Breast: Soft               Formula Type: Similac 360 Total Care     Latch: Grasps breast, tongue down, lips flanged, rhythmic sucking  Audible Swallowing: None  Type of Nipple: Everted (after stimulation)  Comfort (Breast/Nipple): Soft/non-tender  Hold (Positioning): Full assist, teach one side, mother does other, staff holds  LATCH Score: 7     Care Plan Initiated: Reluctant nurser  Lactation Comment: Lactation education and support provided      Reviewed breastfeeding basics:  How milk is made and normal  breastfeeding behaviors discussed.  Supply and demand,  stomach size, early feeding cues, skin to skin bonding with comfortable positioning and baby led latch-on reviewed.  How to identify signs of successful

## 2024-12-06 NOTE — PROGRESS NOTES
0700 Bedside and Verbal shift change report given to FRANCISCA Gaviria RN and Carito Sotomayor RN (oncoming nurse) by MJ Dunlap RN (offgoing nurse). Report included the following information Nurse Handoff Report, Index, Adult Overview, Surgery Report, Intake/Output, MAR, and Recent Results.      This RN precepting Светлана RN. See flowsheets    1515 pt transported with this RN and Светлана RN to [facility]. Report given to RASHAD Ceballos RN.

## 2024-12-07 PROCEDURE — 94761 N-INVAS EAR/PLS OXIMETRY MLT: CPT

## 2024-12-07 PROCEDURE — 6370000000 HC RX 637 (ALT 250 FOR IP): Performed by: OBSTETRICS & GYNECOLOGY

## 2024-12-07 PROCEDURE — 1120000000 HC RM PRIVATE OB

## 2024-12-07 PROCEDURE — 6370000000 HC RX 637 (ALT 250 FOR IP): Performed by: STUDENT IN AN ORGANIZED HEALTH CARE EDUCATION/TRAINING PROGRAM

## 2024-12-07 RX ORDER — HYDROMORPHONE HYDROCHLORIDE 2 MG/1
1 TABLET ORAL EVERY 4 HOURS PRN
Status: DISPENSED | OUTPATIENT
Start: 2024-12-07 | End: 2024-12-08

## 2024-12-07 RX ORDER — IBUPROFEN 800 MG/1
800 TABLET, FILM COATED ORAL EVERY 8 HOURS PRN
Qty: 20 TABLET | Refills: 0 | Status: SHIPPED | OUTPATIENT
Start: 2024-12-07 | End: 2024-12-14

## 2024-12-07 RX ORDER — HYDROMORPHONE HYDROCHLORIDE 2 MG/1
2 TABLET ORAL EVERY 6 HOURS PRN
Qty: 4 TABLET | Refills: 0 | Status: SHIPPED | OUTPATIENT
Start: 2024-12-07 | End: 2024-12-10

## 2024-12-07 RX ORDER — LIDOCAINE HYDROCHLORIDE 10 MG/ML
1 INJECTION, SOLUTION EPIDURAL; INFILTRATION; INTRACAUDAL; PERINEURAL ONCE
Status: DISCONTINUED | OUTPATIENT
Start: 2024-12-07 | End: 2024-12-08 | Stop reason: HOSPADM

## 2024-12-07 RX ADMIN — HYDROMORPHONE HYDROCHLORIDE 1 MG: 2 TABLET ORAL at 05:55

## 2024-12-07 RX ADMIN — HYDROMORPHONE HYDROCHLORIDE 1 MG: 2 TABLET ORAL at 14:24

## 2024-12-07 ASSESSMENT — PAIN DESCRIPTION - LOCATION
LOCATION: ABDOMEN
LOCATION: ABDOMEN

## 2024-12-07 ASSESSMENT — PAIN - FUNCTIONAL ASSESSMENT: PAIN_FUNCTIONAL_ASSESSMENT: ACTIVITIES ARE NOT PREVENTED

## 2024-12-07 ASSESSMENT — PAIN SCALES - GENERAL
PAINLEVEL_OUTOF10: 9
PAINLEVEL_OUTOF10: 5

## 2024-12-07 ASSESSMENT — PAIN DESCRIPTION - ORIENTATION
ORIENTATION: LOWER
ORIENTATION: LOWER

## 2024-12-07 ASSESSMENT — PAIN DESCRIPTION - DESCRIPTORS
DESCRIPTORS: CRAMPING
DESCRIPTORS: CRAMPING

## 2024-12-07 NOTE — PROGRESS NOTES
Post-Partum Progress Note    Patient doing well post-partum without significant complaint.  She is voiding without difficulty, she reports normal lochia. Her pain is well controlled with oral pain medication. She is tolerating a general diet. Using dilaudid for cramping, feels like NSAIDs don't help.    Delivery Type: Vaginal, Spontaneous   By Delivering Clinician:CORIN MAGDALENO   Delivery Date /Time: 2024 9:17 AM     Information for the patient's :  Javier Pruitt [[id number]]   Vaginal, Spontaneous       Vitals:  Patient Vitals for the past 8 hrs:   BP Temp Pulse Resp SpO2   24 0535 109/65 97.9 °F (36.6 °C) 62 16 100 %     Temp (24hrs), Av.2 °F (36.8 °C), Min:97.7 °F (36.5 °C), Max:98.7 °F (37.1 °C)    Vitals:    24 0535   BP: 109/65   Pulse: 62   Resp: 16   Temp: 97.9 °F (36.6 °C)   SpO2: 100%       Exam:    General: Patient without distress.  Abdomen: soft, fundus firm at level of umbilicus, nontender  Lower extremities: negative for cords or tenderness.    Labs: No results found for this or any previous visit (from the past 24 hour(s)).    Assessment:    1. Postpartum S/P spontaneous vaginal delivery, PPD 1  2. Patient doing well without significant complications    Plan:  1. Continue routine postpartum care  2. Routine perineal care and maternal education   3. Oral pain medications and bowel regimen as needed       4. The risks and benefits of the circumcision  procedure and anesthesia including: bleeding, infection, variability of cosmetic results were discussed at length with the mother. She is aware that future repeat procedures may be necessary. She gives informed consent to proceed as noted and her questions are answered.     Fozia Gates MD

## 2024-12-07 NOTE — PROGRESS NOTES
Crosscover note:  Called by RN for dilaudid or pain     Pt is s/p  but reports pain with breastfeeding and NSAIDs don't help per RN and physician note (Dr Gates) this morning. Dilaudid last received 0555 this AM.     Will continue with dilaudid PRN until  @ 0700 when primary team will round.

## 2024-12-08 VITALS
SYSTOLIC BLOOD PRESSURE: 91 MMHG | TEMPERATURE: 97.9 F | DIASTOLIC BLOOD PRESSURE: 50 MMHG | WEIGHT: 200 LBS | BODY MASS INDEX: 32.14 KG/M2 | HEIGHT: 66 IN | OXYGEN SATURATION: 100 % | RESPIRATION RATE: 16 BRPM | HEART RATE: 60 BPM

## 2024-12-08 PROCEDURE — 94761 N-INVAS EAR/PLS OXIMETRY MLT: CPT

## 2024-12-08 PROCEDURE — 6370000000 HC RX 637 (ALT 250 FOR IP): Performed by: OBSTETRICS & GYNECOLOGY

## 2024-12-08 RX ADMIN — HYDROMORPHONE HYDROCHLORIDE 1 MG: 2 TABLET ORAL at 00:00

## 2024-12-08 ASSESSMENT — PAIN - FUNCTIONAL ASSESSMENT: PAIN_FUNCTIONAL_ASSESSMENT: ACTIVITIES ARE NOT PREVENTED

## 2024-12-08 ASSESSMENT — PAIN DESCRIPTION - DESCRIPTORS: DESCRIPTORS: THROBBING;SORE

## 2024-12-08 ASSESSMENT — PAIN DESCRIPTION - LOCATION: LOCATION: PERINEUM

## 2024-12-08 ASSESSMENT — PAIN SCALES - GENERAL: PAINLEVEL_OUTOF10: 5

## 2024-12-08 NOTE — PROGRESS NOTES
Post-Partum Progress Note    Patient doing well post-partum without significant complaint.  She is voiding without difficulty, she reports normal lochia. Her pain is well controlled with oral pain medication. She is tolerating a general diet.    Delivery Type: Vaginal, Spontaneous   By Delivering Clinician:CORIN MAGDALENO   Delivery Date /Time: 2024 9:17 AM     Information for the patient's :  Javier Pruitt [[id number]]   Vaginal, Spontaneous       Vitals:  Patient Vitals for the past 8 hrs:   BP Temp Pulse Resp SpO2   24 0633 (!) 91/50 97.9 °F (36.6 °C) 60 16 100 %   24 0000 -- -- -- 18 --     Temp (24hrs), Av °F (36.7 °C), Min:97.9 °F (36.6 °C), Max:98.1 °F (36.7 °C)    Vitals:    24   BP: (!) 91/50   Pulse: 60   Resp: 16   Temp: 97.9 °F (36.6 °C)   SpO2: 100%       Exam:    General: Patient without distress.  Abdomen: soft, fundus firm at level of umbilicus, nontender  Lower extremities: negative for cords or tenderness.    Labs: No results found for this or any previous visit (from the past 24 hour(s)).    Assessment:    1. Postpartum S/P spontaneous vaginal delivery   2. Patient doing well without significant complications    Plan:  1. Continue routine postpartum care  2. Routine perineal care and maternal education   3. Oral pain medications and bowel regimen as needed       4. Disc short course of narcotic for managing pain and if NI rec OBGYN fu this week and transition to NSAIDs as tolerated.    Fozia Gates MD

## 2024-12-09 LAB
ABO + RH BLD: NORMAL
ANTIGENS PRESENT RBC DONR: NORMAL
ANTIGENS PRESENT RBC DONR: NORMAL
BLD PROD TYP BPU: NORMAL
BLD PROD TYP BPU: NORMAL
BLOOD BANK CMNT PATIENT-IMP: NORMAL
BLOOD BANK DISPENSE STATUS: NORMAL
BLOOD BANK DISPENSE STATUS: NORMAL
BLOOD GROUP ANTIBODIES SERPL: NORMAL
BLOOD GROUP ANTIBODIES SERPL: NORMAL
BPU ID: NORMAL
BPU ID: NORMAL
CROSSMATCH RESULT: NORMAL
CROSSMATCH RESULT: NORMAL
SPECIMEN EXP DATE BLD: NORMAL
T PALLIDUM AB SER QL IA: NON REACTIVE
UNIT DIVISION: 0
UNIT DIVISION: 0

## 2024-12-09 NOTE — DISCHARGE SUMMARY
Obstetrical Discharge Summary     Name: Sharri Pruitt MRN: [id number]  SSN: [id number]    YOB: 1980  Age: 44 y.o.  Sex: female      Admit Date: 2024    Discharge Date: 2024 12:40 PM    Admitting Physician: Lolly Plasencia MD     Attending Physician:  Dr. Omalley    Admission Diagnoses: Term pregnancy [Z34.90]    Procedures for this admission:     Discharge Diagnoses:   Information for the patient's :  Javier Pruitt [[id number]]   @[phone]@     Discharge Condition: good    Disposition:  home      Hospital Course: Normal hospital course following the delivery.    Patient Instructions:   Discharge Medication List as of 2024 10:19 AM        START taking these medications    Details   ibuprofen (ADVIL;MOTRIN) 800 MG tablet Take 1 tablet by mouth every 8 hours as needed for Pain Take with food., Disp-20 tablet, R-0Normal      HYDROmorphone (DILAUDID) 2 MG tablet Take 1 tablet by mouth every 6 hours as needed for Pain for up to 3 days. Max Daily Amount: 8 mg, Disp-4 tablet, R-0Normal           CONTINUE these medications which have NOT CHANGED    Details   ferrous sulfate (IRON 325) 325 (65 Fe) MG tablet Take 1 tablet by mouth daily, Disp-90 tablet, R-1Normal      omeprazole (PRILOSEC) 40 MG delayed release capsule Take 1 capsule by mouth in the morning and at bedtime, Disp-180 capsule, R-3Normal      valACYclovir (VALTREX) 500 MG tablet Take 1 tablet by mouth 2 times daily, Disp-180 tablet, R-3Normal      Prenatal Vit w/Bh-Zdemfytlq-RS (PNV PO) Take by mouthHistorical Med      acetaminophen (TYLENOL) 500 MG tablet Take 2 tablets by mouth every 6 hours for 10 days, Disp-80 tablet, R-0Normal           STOP taking these medications       aspirin 81 MG EC tablet Comments:   Reason for Stopping:         dextromethorphan-guaiFENesin (ROBITUSSIN-DM)  MG/5ML syrup Comments:   Reason for Stopping:         ondansetron (ZOFRAN-ODT) 4 MG disintegrating tablet Comments:   Reason for

## 2024-12-11 ENCOUNTER — PATIENT MESSAGE (OUTPATIENT)
Age: 44
End: 2024-12-11

## 2024-12-11 DIAGNOSIS — N94.89 UTERINE CRAMPING: Primary | ICD-10-CM

## 2024-12-12 RX ORDER — KETOROLAC TROMETHAMINE 10 MG/1
10 TABLET, FILM COATED ORAL EVERY 6 HOURS PRN
Qty: 20 TABLET | Refills: 0 | Status: SHIPPED | OUTPATIENT
Start: 2024-12-12 | End: 2025-12-12

## 2025-01-27 ENCOUNTER — POSTPARTUM VISIT (OUTPATIENT)
Age: 45
End: 2025-01-27

## 2025-01-27 VITALS
BODY MASS INDEX: 34.22 KG/M2 | SYSTOLIC BLOOD PRESSURE: 116 MMHG | HEART RATE: 83 BPM | WEIGHT: 212 LBS | DIASTOLIC BLOOD PRESSURE: 73 MMHG

## 2025-01-27 DIAGNOSIS — Z12.4 SCREENING FOR CERVICAL CANCER: ICD-10-CM

## 2025-01-27 PROCEDURE — 0503F POSTPARTUM CARE VISIT: CPT | Performed by: STUDENT IN AN ORGANIZED HEALTH CARE EDUCATION/TRAINING PROGRAM

## 2025-01-27 NOTE — PROGRESS NOTES
Sharri Pruitt is a 44 y.o. female returns for a routine post-partum follow-up visit     Chief Complaint   Patient presents with    Postpartum Care       Postpartum Depression: High Risk (2024)    [place]  Depression Scale     Last EPDS Total Score: 5     Last EPDS Self Harm Result: Hardly ever         Type of delivery: normal spontaneous vaginal delivery  Date of Delivery: 2024  Breastfeeding: no, but is pumping  Bleeding Resolved: yes  Birth Control: abstinence.  Last Pap: abnormal obtained 6 months ago.  Problems: no problems      Examination chaperoned by RAUL LAWRENCE RN.

## 2025-01-27 NOTE — PROGRESS NOTES
Postpartum evaluation    Sharri Pruitt is a 44 y.o. female who presents for a postpartum exam.     She is now six weeks post normal spontaneous vaginal delivery.    Her baby is doing well.    She has had no menses since delivery.     She has had the following significant problems since her delivery: none    The patient is breast and bottle feeding without difficulty.     The patient plans to be abstinent and declines contraception.     She is currently taking: no medications.     She is due for her next AE in 6 months.     /73   Pulse 83   Wt 96.2 kg (212 lb)   Breastfeeding No Comment: is pumping  BMI 34.22 kg/m²     PHYSICAL EXAMINATION    Constitutional  Appearance: well-nourished, well developed, alert, in no acute distress    HENT  Head and Face: appears normal    Neck  Inspection/Palpation: normal appearance, no masses or tenderness  Lymph Nodes: no lymphadenopathy present  Thyroid: gland size normal, nontender, no nodules or masses present on palpation    Breasts  Inspection of Breasts: breasts symmetrical, no skin changes, no discharge present, nipple appearance normal, no skin retraction present  Palpation of Breasts and Axillae: no masses present on palpation, no breast tenderness  Axillary Lymph Nodes: no lymphadenopathy present    Gastrointestinal  Abdominal Examination: abdomen non-tender to palpation, normal bowel sounds, no masses present  Liver and spleen: no hepatomegaly present, spleen not palpable  Hernias: no hernias identified    Genitourinary  External Genitalia: normal appearance for age, no discharge present, no tenderness present, no inflammatory lesions present, no masses present, no atrophy present  Vagina: normal vaginal vault without central or paravaginal defects, no discharge present, no inflammatory lesions present, no masses present  Bladder: non-tender to palpation  Urethra: appears normal  Cervix: normal   Uterus: normal size, shape and consistency  Adnexa: no adnexal

## 2025-01-28 DIAGNOSIS — D50.9 IRON DEFICIENCY ANEMIA DURING PREGNANCY: ICD-10-CM

## 2025-01-28 DIAGNOSIS — O99.019 IRON DEFICIENCY ANEMIA DURING PREGNANCY: ICD-10-CM

## 2025-01-29 ENCOUNTER — TELEPHONE (OUTPATIENT)
Age: 45
End: 2025-01-29

## 2025-01-30 LAB
CYTOLOGIST CVX/VAG CYTO: NORMAL
CYTOLOGY CVX/VAG DOC CYTO: NORMAL
CYTOLOGY CVX/VAG DOC THIN PREP: NORMAL
DX ICD CODE: NORMAL
HPV GENOTYPE REFLEX: NORMAL
HPV I/H RISK 4 DNA CVX QL PROBE+SIG AMP: NEGATIVE
Lab: NORMAL
Lab: NORMAL
OTHER STN SPEC: NORMAL
STAT OF ADQ CVX/VAG CYTO-IMP: NORMAL

## 2025-04-03 NOTE — PROGRESS NOTES
Transition of Care: likely home with followup with specialist; patient states she does not have a PCP nor does she want a PCP    Transport Plan: patient states she drove her car here to [facility] and will drive herself home    RUR: n/a    Observation notice provided in writing to patient and/or caregiver as well as verbal explanation of the policy. Patients who are in outpatient status also receive the Observation notice. 1315: this CM met with patient at bedside; she is alert and oriented x4; she states she lives in a home in [facility] [address] with her mother and works in [place]; she is independent in her ADLs and uses no assistive devices for ambulation; she states she does not have a PCP nor does she want a PCP at this time (she declined PCP list); preferred pharmacy is the 24 hour [facility] [facility]. And [address] [facility].  In [place], [address]    Reason for Admission:  dysphagia                     RUR Score:    n/a                 Plan for utilizing home health:   Likely none       PCP: First and Last name:  None patient states she does not have a PCP nor wants a PCP at this time     Name of Practice: n/a   Are you a current patient: Yes/No: n/a   Approximate date of last visit: n/a   Can you participate in a virtual visit with your PCP: n/a                    Current Advanced Directive/Advance Care Plan: Full Code      Healthcare Decision Maker: self  Click here to complete [address] including selection of the Healthcare Decision Maker Relationship (ie \"Primary\")                             Transition of Care Plan:      Home with followup with specialist; patient is driving herself home in her car    Care Management Interventions  PCP Verified by CM: No(pt states she does not have one and does not want one; refused list of PCP)  Mode of Transport at Discharge: Self(pt states she drove herself here to hospital)  Transition of Care Consult (CM Consult): (TBD; likely home with f/uwith specialist)  Physical Therapy Consult: No  Occupational Therapy Consult: No  Current Support Network:  Other(lives with her mother in a home in [place])  Discharge Location  Discharge Placement: Other:(likely home with f/u with specialist)     CM following  Daniel Sin RN, CRM Winlevi Pregnancy And Lactation Text: This medication is considered safe during pregnancy and breastfeeding. Doxycycline Pregnancy And Lactation Text: This medication is Pregnancy Category D and not consider safe during pregnancy. It is also excreted in breast milk but is considered safe for shorter treatment courses. Bactrim Counseling:  I discussed with the patient the risks of sulfa antibiotics including but not limited to GI upset, allergic reaction, drug rash, diarrhea, dizziness, photosensitivity, and yeast infections.  Rarely, more serious reactions can occur including but not limited to aplastic anemia, agranulocytosis, methemoglobinemia, blood dyscrasias, liver or kidney failure, lung infiltrates or desquamative/blistering drug rashes. Tazorac Counseling:  Patient advised that medication is irritating and drying.  Patient may need to apply sparingly and wash off after an hour before eventually leaving it on overnight.  The patient verbalized understanding of the proper use and possible adverse effects of tazorac.  All of the patient's questions and concerns were addressed. Erythromycin Pregnancy And Lactation Text: This medication is Pregnancy Category B and is considered safe during pregnancy. It is also excreted in breast milk. Use Enhanced Medication Counseling?: No High Dose Vitamin A Counseling: Side effects reviewed, pt to contact office should one occur. Dapsone Pregnancy And Lactation Text: This medication is Pregnancy Category C and is not considered safe during pregnancy or breast feeding. Azithromycin Counseling:  I discussed with the patient the risks of azithromycin including but not limited to GI upset, allergic reaction, drug rash, diarrhea, and yeast infections. Tetracycline Pregnancy And Lactation Text: This medication is Pregnancy Category D and not consider safe during pregnancy. It is also excreted in breast milk. Minocycline Counseling: Patient advised regarding possible photosensitivity and discoloration of the teeth, skin, lips, tongue and gums.  Patient instructed to avoid sunlight, if possible.  When exposed to sunlight, patients should wear protective clothing, sunglasses, and sunscreen.  The patient was instructed to call the office immediately if the following severe adverse effects occur:  hearing changes, easy bruising/bleeding, severe headache, or vision changes.  The patient verbalized understanding of the proper use and possible adverse effects of minocycline.  All of the patient's questions and concerns were addressed. Topical Retinoid counseling:  Patient advised to apply a pea-sized amount only at bedtime and wait 30 minutes after washing their face before applying.  If too drying, patient may add a non-comedogenic moisturizer. The patient verbalized understanding of the proper use and possible adverse effects of retinoids.  All of the patient's questions and concerns were addressed. Topical Clindamycin Pregnancy And Lactation Text: This medication is Pregnancy Category B and is considered safe during pregnancy. It is unknown if it is excreted in breast milk. Birth Control Pills Pregnancy And Lactation Text: This medication should be avoided if pregnant and for the first 30 days post-partum. Spironolactone Pregnancy And Lactation Text: This medication can cause feminization of the male fetus and should be avoided during pregnancy. The active metabolite is also found in breast milk. Benzoyl Peroxide Counseling: Patient counseled that medicine may cause skin irritation and bleach clothing.  In the event of skin irritation, the patient was advised to reduce the amount of the drug applied or use it less frequently.   The patient verbalized understanding of the proper use and possible adverse effects of benzoyl peroxide.  All of the patient's questions and concerns were addressed. Topical Sulfur Applications Pregnancy And Lactation Text: This medication is Pregnancy Category C and has an unknown safety profile during pregnancy. It is unknown if this topical medication is excreted in breast milk. Detail Level: Zone Erythromycin Counseling:  I discussed with the patient the risks of erythromycin including but not limited to GI upset, allergic reaction, drug rash, diarrhea, increase in liver enzymes, and yeast infections. Bactrim Pregnancy And Lactation Text: This medication is Pregnancy Category D and is known to cause fetal risk.  It is also excreted in breast milk. Isotretinoin Counseling: Patient should get monthly blood tests, not donate blood, not drive at night if vision affected, not share medication, and not undergo elective surgery for 6 months after tx completed. Side effects reviewed, pt to contact office should one occur. Topical Retinoid Pregnancy And Lactation Text: This medication is Pregnancy Category C. It is unknown if this medication is excreted in breast milk. Azithromycin Pregnancy And Lactation Text: This medication is considered safe during pregnancy and is also secreted in breast milk. Aklief counseling:  Patient advised to apply a pea-sized amount only at bedtime and wait 30 minutes after washing their face before applying.  If too drying, patient may add a non-comedogenic moisturizer.  The most commonly reported side effects including irritation, redness, scaling, dryness, stinging, burning, itching, and increased risk of sunburn.  The patient verbalized understanding of the proper use and possible adverse effects of retinoids.  All of the patient's questions and concerns were addressed. Azelaic Acid Counseling: Patient counseled that medicine may cause skin irritation and to avoid applying near the eyes.  In the event of skin irritation, the patient was advised to reduce the amount of the drug applied or use it less frequently.   The patient verbalized understanding of the proper use and possible adverse effects of azelaic acid.  All of the patient's questions and concerns were addressed. Tazorac Pregnancy And Lactation Text: This medication is not safe during pregnancy. It is unknown if this medication is excreted in breast milk. Topical Sulfur Applications Counseling: Topical Sulfur Counseling: Patient counseled that this medication may cause skin irritation or allergic reactions.  In the event of skin irritation, the patient was advised to reduce the amount of the drug applied or use it less frequently.   The patient verbalized understanding of the proper use and possible adverse effects of topical sulfur application.  All of the patient's questions and concerns were addressed. Benzoyl Peroxide Pregnancy And Lactation Text: This medication is Pregnancy Category C. It is unknown if benzoyl peroxide is excreted in breast milk. Dapsone Counseling: I discussed with the patient the risks of dapsone including but not limited to hemolytic anemia, agranulocytosis, rashes, methemoglobinemia, kidney failure, peripheral neuropathy, headaches, GI upset, and liver toxicity.  Patients who start dapsone require monitoring including baseline LFTs and weekly CBCs for the first month, then every month thereafter.  The patient verbalized understanding of the proper use and possible adverse effects of dapsone.  All of the patient's questions and concerns were addressed. Doxycycline Counseling:  Patient counseled regarding possible photosensitivity and increased risk for sunburn.  Patient instructed to avoid sunlight, if possible.  When exposed to sunlight, patients should wear protective clothing, sunglasses, and sunscreen.  The patient was instructed to call the office immediately if the following severe adverse effects occur:  hearing changes, easy bruising/bleeding, severe headache, or vision changes.  The patient verbalized understanding of the proper use and possible adverse effects of doxycycline.  All of the patient's questions and concerns were addressed. High Dose Vitamin A Pregnancy And Lactation Text: High dose vitamin A therapy is contraindicated during pregnancy and breast feeding. Winlevi Counseling:  I discussed with the patient the risks of topical clascoterone including but not limited to erythema, scaling, itching, and stinging. Patient voiced their understanding. Spironolactone Counseling: Patient advised regarding risks of diarrhea, abdominal pain, hyperkalemia, birth defects (for female patients), liver toxicity and renal toxicity. The patient may need blood work to monitor liver and kidney function and potassium levels while on therapy. The patient verbalized understanding of the proper use and possible adverse effects of spironolactone.  All of the patient's questions and concerns were addressed. Isotretinoin Pregnancy And Lactation Text: This medication is Pregnancy Category X and is considered extremely dangerous during pregnancy. It is unknown if it is excreted in breast milk. Tetracycline Counseling: Patient counseled regarding possible photosensitivity and increased risk for sunburn.  Patient instructed to avoid sunlight, if possible.  When exposed to sunlight, patients should wear protective clothing, sunglasses, and sunscreen.  The patient was instructed to call the office immediately if the following severe adverse effects occur:  hearing changes, easy bruising/bleeding, severe headache, or vision changes.  The patient verbalized understanding of the proper use and possible adverse effects of tetracycline.  All of the patient's questions and concerns were addressed. Patient understands to avoid pregnancy while on therapy due to potential birth defects. Sarecycline Counseling: Patient advised regarding possible photosensitivity and discoloration of the teeth, skin, lips, tongue and gums.  Patient instructed to avoid sunlight, if possible.  When exposed to sunlight, patients should wear protective clothing, sunglasses, and sunscreen.  The patient was instructed to call the office immediately if the following severe adverse effects occur:  hearing changes, easy bruising/bleeding, severe headache, or vision changes.  The patient verbalized understanding of the proper use and possible adverse effects of sarecycline.  All of the patient's questions and concerns were addressed. Aklief Pregnancy And Lactation Text: It is unknown if this medication is safe to use during pregnancy.  It is unknown if this medication is excreted in breast milk.  Breastfeeding women should use the topical cream on the smallest area of the skin for the shortest time needed while breastfeeding.  Do not apply to nipple and areola. Azelaic Acid Pregnancy And Lactation Text: This medication is considered safe during pregnancy and breast feeding. Birth Control Pills Counseling: Birth Control Pill Counseling: I discussed with the patient the potential side effects of OCPs including but not limited to increased risk of stroke, heart attack, thrombophlebitis, deep venous thrombosis, hepatic adenomas, breast changes, GI upset, headaches, and depression.  The patient verbalized understanding of the proper use and possible adverse effects of OCPs. All of the patient's questions and concerns were addressed. Topical Clindamycin Counseling: Patient counseled that this medication may cause skin irritation or allergic reactions.  In the event of skin irritation, the patient was advised to reduce the amount of the drug applied or use it less frequently.   The patient verbalized understanding of the proper use and possible adverse effects of clindamycin.  All of the patient's questions and concerns were addressed.

## 2025-04-17 ENCOUNTER — TELEPHONE (OUTPATIENT)
Age: 45
End: 2025-04-17

## 2025-04-17 NOTE — TELEPHONE ENCOUNTER
2 pt identifiers used. Pt called per nurse advise from 4/8/25 encounter to schedule appt w/ Dr. Keyes. When asked reason for visit pt stated \"to discuss weight loss surgery and other personal matters\". Pt preferred afternoon availabilities, and will call us if decides to reschedule appt for alt date/time.

## 2025-04-23 ENCOUNTER — PATIENT MESSAGE (OUTPATIENT)
Age: 45
End: 2025-04-23

## 2025-04-23 ENCOUNTER — OFFICE VISIT (OUTPATIENT)
Age: 45
End: 2025-04-23
Payer: COMMERCIAL

## 2025-04-23 VITALS
OXYGEN SATURATION: 96 % | BODY MASS INDEX: 35.84 KG/M2 | WEIGHT: 223 LBS | RESPIRATION RATE: 18 BRPM | SYSTOLIC BLOOD PRESSURE: 132 MMHG | HEIGHT: 66 IN | DIASTOLIC BLOOD PRESSURE: 78 MMHG | TEMPERATURE: 98.2 F | HEART RATE: 97 BPM

## 2025-04-23 DIAGNOSIS — R63.5 WEIGHT GAIN: ICD-10-CM

## 2025-04-23 DIAGNOSIS — F41.9 ANXIETY: Primary | ICD-10-CM

## 2025-04-23 PROCEDURE — 99214 OFFICE O/P EST MOD 30 MIN: CPT | Performed by: SURGERY

## 2025-04-23 RX ORDER — MEDROXYPROGESTERONE ACETATE 150 MG/ML
150 INJECTION, SUSPENSION INTRAMUSCULAR
Qty: 1 ML | Refills: 3 | Status: SHIPPED | OUTPATIENT
Start: 2025-04-23

## 2025-04-23 RX ORDER — LORAZEPAM 0.5 MG/1
0.5 TABLET ORAL DAILY PRN
Qty: 30 TABLET | Refills: 0 | Status: SHIPPED | OUTPATIENT
Start: 2025-04-23 | End: 2025-05-23

## 2025-04-23 ASSESSMENT — PATIENT HEALTH QUESTIONNAIRE - PHQ9
SUM OF ALL RESPONSES TO PHQ QUESTIONS 1-9: 15
SUM OF ALL RESPONSES TO PHQ QUESTIONS 1-9: 15
7. TROUBLE CONCENTRATING ON THINGS, SUCH AS READING THE NEWSPAPER OR WATCHING TELEVISION: SEVERAL DAYS
6. FEELING BAD ABOUT YOURSELF - OR THAT YOU ARE A FAILURE OR HAVE LET YOURSELF OR YOUR FAMILY DOWN: SEVERAL DAYS
8. MOVING OR SPEAKING SO SLOWLY THAT OTHER PEOPLE COULD HAVE NOTICED. OR THE OPPOSITE, BEING SO FIGETY OR RESTLESS THAT YOU HAVE BEEN MOVING AROUND A LOT MORE THAN USUAL: SEVERAL DAYS
10. IF YOU CHECKED OFF ANY PROBLEMS, HOW DIFFICULT HAVE THESE PROBLEMS MADE IT FOR YOU TO DO YOUR WORK, TAKE CARE OF THINGS AT HOME, OR GET ALONG WITH OTHER PEOPLE: VERY DIFFICULT
5. POOR APPETITE OR OVEREATING: SEVERAL DAYS
SUM OF ALL RESPONSES TO PHQ QUESTIONS 1-9: 15
9. THOUGHTS THAT YOU WOULD BE BETTER OFF DEAD, OR OF HURTING YOURSELF: SEVERAL DAYS
1. LITTLE INTEREST OR PLEASURE IN DOING THINGS: NEARLY EVERY DAY
SUM OF ALL RESPONSES TO PHQ QUESTIONS 1-9: 14
3. TROUBLE FALLING OR STAYING ASLEEP: MORE THAN HALF THE DAYS
4. FEELING TIRED OR HAVING LITTLE ENERGY: NEARLY EVERY DAY
2. FEELING DOWN, DEPRESSED OR HOPELESS: MORE THAN HALF THE DAYS

## 2025-04-23 ASSESSMENT — COLUMBIA-SUICIDE SEVERITY RATING SCALE - C-SSRS
6. HAVE YOU EVER DONE ANYTHING, STARTED TO DO ANYTHING, OR PREPARED TO DO ANYTHING TO END YOUR LIFE?: NO
1. WITHIN THE PAST MONTH, HAVE YOU WISHED YOU WERE DEAD OR WISHED YOU COULD GO TO SLEEP AND NOT WAKE UP?: NO
2. HAVE YOU ACTUALLY HAD ANY THOUGHTS OF KILLING YOURSELF?: NO

## 2025-04-23 NOTE — PROGRESS NOTES
Identified pt with two pt identifiers (name and ). Reviewed chart in preparation for visit and have obtained necessary documentation.    Sharri Pruitt is a 44 y.o. female Follow-up (Discuss weight loss surgery)  .    Vitals:    25 1416   BP: 132/78   BP Site: Left Upper Arm   Patient Position: Sitting   BP Cuff Size: Large Adult   Pulse: 97   Resp: 18   Temp: 98.2 °F (36.8 °C)   TempSrc: Oral   SpO2: 96%   Weight: 101.2 kg (223 lb)   Height: 1.676 m (5' 6\")          1. Have you been to the ER, urgent care clinic since your last visit?  Hospitalized since your last visit?  no     2. Have you seen or consulted any other health care providers outside of the [facility] since your last visit?  Include any pap smears or colon screening.  no  
this.    Office notes given to patient from our first few encounters in terms of supportive documentation for her absence from work during her nutrition and G tube phase.    We will have her see our NP to discuss possible GLP-1 therapy to help her with her weight loss going forward.      Marko Keyes MD, FACS, [facility]  Bariatric and General Surgeon  [facility]

## 2025-04-25 ENCOUNTER — CLINICAL SUPPORT (OUTPATIENT)
Age: 45
End: 2025-04-25
Payer: COMMERCIAL

## 2025-04-25 VITALS
HEART RATE: 76 BPM | HEIGHT: 66 IN | SYSTOLIC BLOOD PRESSURE: 117 MMHG | DIASTOLIC BLOOD PRESSURE: 78 MMHG | WEIGHT: 218 LBS | BODY MASS INDEX: 35.03 KG/M2

## 2025-04-25 DIAGNOSIS — Z30.42 SURVEILLANCE FOR DEPO-PROVERA CONTRACEPTION: Primary | ICD-10-CM

## 2025-04-25 LAB
HCG, PREGNANCY, URINE, POC: NEGATIVE
VALID INTERNAL CONTROL, POC: NORMAL

## 2025-04-25 PROCEDURE — 81025 URINE PREGNANCY TEST: CPT | Performed by: STUDENT IN AN ORGANIZED HEALTH CARE EDUCATION/TRAINING PROGRAM

## 2025-04-25 PROCEDURE — 96372 THER/PROPH/DIAG INJ SC/IM: CPT | Performed by: STUDENT IN AN ORGANIZED HEALTH CARE EDUCATION/TRAINING PROGRAM

## 2025-04-25 RX ORDER — MEDROXYPROGESTERONE ACETATE 150 MG/ML
150 INJECTION, SUSPENSION INTRAMUSCULAR
Status: SHIPPED | OUTPATIENT
Start: 2025-04-25

## 2025-04-25 RX ADMIN — MEDROXYPROGESTERONE ACETATE 150 MG: 150 INJECTION, SUSPENSION INTRAMUSCULAR at 10:16

## 2025-04-25 NOTE — PROGRESS NOTES
After obtaining consent, and per orders of , injection of Depo given in right deltoid by Zehra Ferreira MA. Patient instructed to remain in clinic for 20 minutes afterwards, and to report any adverse reaction to me immediately. Lot: [id number] Exp: 11/30/2028 NDC: 25923-757-15 , VIS given.

## 2025-07-07 ENCOUNTER — HOSPITAL ENCOUNTER (EMERGENCY)
Facility: HOSPITAL | Age: 45
Discharge: HOME | End: 2025-07-07
Payer: COMMERCIAL

## 2025-07-07 VITALS
TEMPERATURE: 98.6 F | RESPIRATION RATE: 16 BRPM | HEART RATE: 99 BPM | SYSTOLIC BLOOD PRESSURE: 129 MMHG | OXYGEN SATURATION: 98 % | DIASTOLIC BLOOD PRESSURE: 80 MMHG
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
## 2025-07-07 DIAGNOSIS — M79.602 LEFT ARM PAIN: ICD-10-CM

## 2025-07-07 PROCEDURE — 99283 EMERGENCY DEPT VISIT LOW MDM: CPT

## 2025-07-07 RX ORDER — ACETAMINOPHEN 500 MG
1000 TABLET ORAL 3 TIMES DAILY PRN
Qty: 30 TABLET | Refills: 0 | Status: SHIPPED | OUTPATIENT
Start: 2025-07-07 | End: 2025-07-17

## 2025-07-07 RX ORDER — IBUPROFEN 600 MG/1
600 TABLET, FILM COATED ORAL 3 TIMES DAILY PRN
Qty: 30 TABLET | Refills: 0 | Status: SHIPPED | OUTPATIENT
Start: 2025-07-07

## 2025-07-07 ASSESSMENT — PAIN SCALES - GENERAL: PAINLEVEL_OUTOF10: 0
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
## 2025-07-08 NOTE — ED PROVIDER NOTES
10/02/2023    EGD ESOPHAGOGASTRODUODENOSCOPY performed by Marko Keyes MD at [facility] END[facility]    UPPER GASTROINTESTINAL ENDOSCOPY N/A 10/02/2023    EGD BIOPSY performed by Marko Keyes MD at [facility] ENDOSCOPY    UPPER GASTROINTESTINAL ENDOSCOPY  10/02/2023    EGD ESOPHAGOGASTRODUODENOSCOPY DILATATION performed by Marko Keyes MD at [facility]    WISDOM TOOTH EXTRACTION         Family History:  Family History   Problem Relation Age of Onset    High Blood Pressure Mother     Sleep Apnea Mother     Diabetes Maternal Grandmother     Anesth Problems Neg Hx        Social History:  Social History     Tobacco Use    Smoking status: Former     Current packs/day: 0.00     Types: Cigarettes     Quit date: 2019     Years since quittin.3     Passive exposure: Past    Smokeless tobacco: Never    Tobacco comments:     LESS THAN 4 CIGARETTES    Vaping Use    Vaping status: Never Used   Substance Use Topics    Alcohol use: No    Drug use: Yes     Types: Marijuana (Weed)     Comment: 2 weeks ago       Allergies:  Allergies   Allergen Reactions    Latex Hives and Itching    Codeine Itching and Other (See Comments)     MUST TAKE BENADRYL PRIOR TO TAKING    Percocet [Oxycodone-Acetaminophen] Itching       PCP: None, None    Current Meds:   Current Facility-Administered Medications   Medication Dose Route Frequency Provider Last Rate Last Admin    medroxyPROGESTERone (DEPO-PROVERA) injection 150 mg  150 mg IntraMUSCular Q3 Months    150 mg at 25 1016     Current Outpatient Medications   Medication Sig Dispense Refill    acetaminophen (TYLENOL) 500 MG tablet Take 2 tablets by mouth 3 times daily as needed for Pain 30 tablet 0    ibuprofen (ADVIL;MOTRIN) 600 MG tablet Take 1 tablet by mouth 3 times daily as needed for Pain 30 tablet 0    medroxyPROGESTERone (DEPO-PROVERA) 150 MG/ML injection Inject 1 mL into the muscle every 3 months 1 mL 3    Norethin Ace-Eth Estrad-FE (BLISOVI 24 FE) 1-20 MG-MCG(24) TABS Take 1 tablet by

## (undated) DEVICE — TUBING, SUCTION, 1/4" X 12', STRAIGHT: Brand: MEDLINE

## (undated) DEVICE — SYSTEM EVAC SMOKE LAPARSCOPIC

## (undated) DEVICE — SUTURING DEVICE: Brand: ENDO STITCH

## (undated) DEVICE — 1200 GUARD II KIT W/5MM TUBE W/O VAC TUBE: Brand: GUARDIAN

## (undated) DEVICE — ESOPHAGEAL BALLOON DILATATION CATHETER: Brand: CRE FIXED WIRE

## (undated) DEVICE — DERMABOND SKIN ADH 0.7ML -- DERMABOND ADVANCED 12/BX

## (undated) DEVICE — GENERAL LAPAROSCOPY - SMH: Brand: MEDLINE INDUSTRIES, INC.

## (undated) DEVICE — ELECTRODE,RADIOTRANSLUCENT,FOAM,3PK: Brand: MEDLINE

## (undated) DEVICE — GLOVE SURG SZ 75 L1212IN FNGR THK138MIL BRN LTX FREE

## (undated) DEVICE — TROCAR SITE CLOSURE DEVICE: Brand: ENDO CLOSE

## (undated) DEVICE — DEVICE SUT VLT PRELD W/ POLYSRB 2-0 L48IN SUT DISP FOR LAP

## (undated) DEVICE — TROCAR: Brand: KII® OPTICAL ACCESS SYSTEM

## (undated) DEVICE — TUBING HYDR IRR --

## (undated) DEVICE — SOLIDIFIER FLD 2OZ 1500CC N DISINF IN BTL DISP SAFESORB

## (undated) DEVICE — SET ADMIN 16ML TBNG L100IN 2 Y INJ SITE IV PIGGY BK DISP (ORDER IN MULIPLES OF 48)

## (undated) DEVICE — CANNULA CUSH AD W/ 14FT TBG

## (undated) DEVICE — FILTER SMK EVAC FLO CLR MEGADYNE

## (undated) DEVICE — SUTURE MCRYL SZ 4-0 L27IN ABSRB UD L19MM PS-2 1/2 CIR PRIM Y426H

## (undated) DEVICE — KIT COLON W/ 1.1OZ LUB AND 2 END

## (undated) DEVICE — SYRINGE MED 3ML CLR PLAS STD N CTRL LUERLOCK TIP DISP

## (undated) DEVICE — BLUNTFILL: Brand: MONOJECT

## (undated) DEVICE — CATH GASTMY BLLN 20FRX7-10ML --

## (undated) DEVICE — ENDO CARRY-ON PROCEDURE KIT INCLUDES ENZYMATIC SPONGE, GAUZE, BIOHAZARD LABEL, TRAY, LUBRICANT, DIRTY SCOPE LABEL, WATER LABEL, TRAY, DRAWSTRING PAD, AND DEFENDO 4-PIECE KIT.: Brand: ENDO CARRY-ON PROCEDURE KIT

## (undated) DEVICE — BLUNTFILL WITH FILTER: Brand: MONOJECT

## (undated) DEVICE — TROCAR: Brand: KII® SLEEVE

## (undated) DEVICE — CLICKLINE SCISSORS INSERT: Brand: CLICKLINE

## (undated) DEVICE — SYRINGE MED 5ML STD CLR PLAS LUERLOCK TIP N CTRL DISP

## (undated) DEVICE — CATHETER IV 22GA L1IN OD0.8382-0.9144MM ID0.6096-0.6858MM 382523

## (undated) DEVICE — Device

## (undated) DEVICE — BITEBLOCK ENDOSCP 60FR MAXI WHT POLYETH STURDY W/ VELC WVN

## (undated) DEVICE — IV STRT KT 3282] LSL INDUSTRIES INC]

## (undated) DEVICE — FORCEPS BX L240CM JAW DIA2.8MM L CAP W/ NDL MIC MESH TOOTH

## (undated) DEVICE — LAPAROSCOPIC TROCAR SLEEVE/SINGLE USE: Brand: KII® OPTICAL ACCESS SYSTEM

## (undated) DEVICE — GARMENT,MEDLINE,DVT,INT,CALF,MED, GEN2: Brand: MEDLINE